# Patient Record
Sex: FEMALE | Race: OTHER | NOT HISPANIC OR LATINO | Employment: FULL TIME | URBAN - NONMETROPOLITAN AREA
[De-identification: names, ages, dates, MRNs, and addresses within clinical notes are randomized per-mention and may not be internally consistent; named-entity substitution may affect disease eponyms.]

---

## 2017-01-23 ENCOUNTER — HOSPITAL ENCOUNTER (OUTPATIENT)
Dept: MAMMOGRAPHY | Facility: HOSPITAL | Age: 47
Discharge: HOME | End: 2017-01-23

## 2017-01-25 ENCOUNTER — LAB REQUISITION (OUTPATIENT)
Dept: LAB | Facility: HOSPITAL | Age: 47
End: 2017-01-25

## 2017-01-25 DIAGNOSIS — I47.9 PAROXYSMAL TACHYCARDIA (HCC): ICD-10-CM

## 2017-01-25 LAB
T3RU NFR SERPL: 31 % (ref 22.5–37)
T4 SERPL-MCNC: 6.8 MCG/DL (ref 4.5–10.9)
TSH SERPL DL<=0.05 MIU/L-ACNC: 2.87 MIU/ML (ref 0.55–4.78)

## 2017-01-25 PROCEDURE — 84443 ASSAY THYROID STIM HORMONE: CPT | Performed by: FAMILY MEDICINE

## 2017-01-25 PROCEDURE — 84436 ASSAY OF TOTAL THYROXINE: CPT | Performed by: FAMILY MEDICINE

## 2017-01-25 PROCEDURE — 84479 ASSAY OF THYROID (T3 OR T4): CPT | Performed by: FAMILY MEDICINE

## 2017-01-26 ENCOUNTER — TRANSCRIBE ORDERS (OUTPATIENT)
Dept: ADMINISTRATIVE | Facility: HOSPITAL | Age: 47
End: 2017-01-26

## 2017-01-31 ENCOUNTER — OFFICE VISIT (OUTPATIENT)
Dept: CARDIOLOGY | Facility: CLINIC | Age: 47
End: 2017-01-31

## 2017-01-31 ENCOUNTER — APPOINTMENT (OUTPATIENT)
Dept: LAB | Facility: HOSPITAL | Age: 47
End: 2017-01-31
Attending: INTERNAL MEDICINE

## 2017-01-31 VITALS
DIASTOLIC BLOOD PRESSURE: 93 MMHG | OXYGEN SATURATION: 97 % | BODY MASS INDEX: 21.12 KG/M2 | HEIGHT: 66 IN | SYSTOLIC BLOOD PRESSURE: 138 MMHG | WEIGHT: 131.4 LBS | HEART RATE: 93 BPM

## 2017-01-31 DIAGNOSIS — R00.0 TACHYCARDIA: ICD-10-CM

## 2017-01-31 DIAGNOSIS — I10 ESSENTIAL HYPERTENSION: ICD-10-CM

## 2017-01-31 DIAGNOSIS — R07.9 CHEST PAIN, UNSPECIFIED TYPE: Primary | ICD-10-CM

## 2017-01-31 PROCEDURE — 93000 ELECTROCARDIOGRAM COMPLETE: CPT | Performed by: INTERNAL MEDICINE

## 2017-01-31 PROCEDURE — 99204 OFFICE O/P NEW MOD 45 MIN: CPT | Performed by: INTERNAL MEDICINE

## 2017-01-31 PROCEDURE — 36415 COLL VENOUS BLD VENIPUNCTURE: CPT | Performed by: INTERNAL MEDICINE

## 2017-01-31 PROCEDURE — 82024 ASSAY OF ACTH: CPT | Performed by: INTERNAL MEDICINE

## 2017-01-31 NOTE — MR AVS SNAPSHOT
Isi Bolton   1/31/2017 2:00 PM   Office Visit    Dept Phone:  [phone]   Encounter #:  [id number]    Provider:  Eh Go DO   Department:  [facility] CARDIOLOGY                Your Full Care Plan              Today's Medication Changes          These changes are accurate as of: 1/31/17  2:54 PM.  If you have any questions, ask your nurse or doctor.               Stop taking medication(s)listed here:     ciprofloxacin 500 MG tablet   Commonly known as:  CIPRO   Stopped by:  Eh Go DO           FLUoxetine 20 MG capsule   Commonly known as:  PROzac   Stopped by:  Eh Go DO                      Your Updated Medication List          This list is accurate as of: 1/31/17  2:54 PM.  Always use your most recent med list.                ALPRAZolam 0.5 MG tablet   Commonly known as:  XANAX   Take 1 tablet by mouth 2 (Two) Times a Day.       amLODIPine 5 MG tablet   Commonly known as:  NORVASC   TAKE ONE TABLET BY MOUTH EVERY DAY       * atenolol 25 MG tablet   Commonly known as:  TENORMIN   Take 1 tablet by mouth Daily.       * atenolol 25 MG tablet   Commonly known as:  TENORMIN   TAKE 1 TABLET BY MOUTH DAILY.       CloNIDine 0.1 MG tablet   Commonly known as:  CATAPRES   Take 1 tablet by mouth 3 (three) times a day as needed.       * lisinopril 40 MG tablet   Commonly known as:  PRINIVIL,ZESTRIL   Take 1 tablet by mouth Daily.       * lisinopril 40 MG tablet   Commonly known as:  PRINIVIL,ZESTRIL   TAKE 1 TABLET BY MOUTH EVERY DAY       pantoprazole 40 MG EC tablet   Commonly known as:  PROTONIX   TAKE 1 TABLET BY MOUTH DAILY.       * Notice:  This list has 4 medication(s) that are the same as other medications prescribed for you. Read the directions carefully, and ask your doctor or other care provider to review them with you.            We Performed the Following     Cardiac Event Monitor     ECG 12 Lead       You Were Diagnosed With        Codes  Comments    Chest pain, unspecified type    -  Primary ICD-10-CM: R07.9  ICD-9-CM: 786.50     Essential hypertension     ICD-10-CM: I10  ICD-9-CM: 401.9     Tachycardia     ICD-10-CM: R00.0  ICD-9-CM: 785.0       Instructions     None    Patient Instructions History      Upcoming Appointments     Visit Type Date Time Department    NEW PATIENT 2017  2:00 PM [facility]    MAMMO COR SCREEN KENNEDY BILATERAL 2017  8:15 AM  [facility]    FOLLOW UP 3/1/2017  2:40 PM [facility]      Intell[employer] Signup     [facility] [place] allows you to send messages to your doctor, view your test results, renew your prescriptions, schedule appointments, and more. To sign up, go to [email or web address] and click on the Sign Up Now link in the New User? box. Enter your [facility] Activation Code exactly as it appears below along with the last four digits of your Social Security Number and your Date of Birth () to complete the sign-up process. If you do not sign up before the expiration date, you must request a new code.    [facility] Activation Code: [email or web address]  Expires: 2017  2:54 PM    If you have questions, you can email [email or web address] or call [phone] to talk to our [facility] staff. Remember, [facility] is NOT to be used for urgent needs. For medical emergencies, dial 911.               Other Info from Your Visit           Your Appointments     2017  8:15 AM EST   Mammo cor screen kennedy bilateral with COR BR CARE MAMM 1   [facility] ([place])    [address] [facility] [facility] [address] [phone]           Bring any films/reports from outside facilities. Do not apply any powders, perfumes, deodorants, or lotions on the day of the exam. Arrive 15 minutes prior to exam time.            Mar 01, 2017  2:40 PM EST   Follow Up with Eh Go DO   [facility] (--)    [address]  [place]  "[id number] [phone]           Arrive 15 minutes prior to appointment.              Allergies     No Known Allergies      Reason for Visit     NEW PATIENT     Hypertension           Vital Signs     Blood Pressure Pulse Height Weight Oxygen Saturation Body Mass Index    138/93 (BP Location: Right arm, Patient Position: Sitting) 93 66\" (167.6 cm) 131 lb 6.4 oz (59.6 kg) 97% 21.21 kg/m2    Smoking Status                   Former Smoker           Problems and Diagnoses Noted     High blood pressure    Fast heart beat    Chest pain    -  Primary      Results     ECG 12 Lead               "

## 2017-01-31 NOTE — PROGRESS NOTES
Subjective   NAME:    Isi Bolton   :      1970  DATE:    2017    Isi Bolton is a 46-year-old  female who appears mildly anxious today.  She also long-standing history of hypertension and has undergone multiple medications.  Over the last 2 months she's had some significant swings in her blood pressures going 9 to 160s and swings in her heart rate up to 130s.  His past history of premature ventricular contractions which are controlled with the beta blocker that she's currently taking.    She denies previous history of myocardial infarction, diabetes mellitus, COPD, renal or liver disease.    REASON FOR VISIT:  Chief Complaint   Patient presents with   • NEW PATIENT   • Hypertension       HISTORY:  PAST MEDICAL HISTORY: History reviewed. No pertinent past medical history.    SURGICAL HISTORY: History reviewed. No pertinent past surgical history.    SOCIAL HISTORY:   Social History     Social History   • Marital status:      Spouse name: N/A   • Number of children: N/A   • Years of education: N/A     Social History Main Topics   • Smoking status: Former Smoker     Types: Electronic Cigarette   • Smokeless tobacco: Current User      Comment: VAPOR    • Alcohol use Yes   • Drug use: No   • Sexual activity: Not Asked     Other Topics Concern   • None     Social History Narrative   • None       FAMILY HISTORY:   Family History   Problem Relation Age of Onset   • No Known Problems Mother    • No Known Problems Father    • No Known Problems Sister        REVIEW OF SYSTEMS:  Review of Systems   Constitutional: Positive for activity change, appetite change and fatigue.   HENT: Positive for tinnitus. Negative for congestion.    Eyes: Positive for visual disturbance.   Respiratory: Positive for chest tightness and shortness of breath. Negative for cough.    Cardiovascular: Positive for chest pain. Negative for palpitations and leg swelling.   Gastrointestinal: Positive for nausea and  "vomiting. Negative for blood in stool.   Endocrine: Positive for cold intolerance and heat intolerance. Negative for polyuria.   Genitourinary: Negative for dysuria.   Musculoskeletal: Positive for myalgias and neck pain.   Skin: Negative for rash.   Neurological: Positive for dizziness, syncope, weakness and light-headedness.   Hematological: Does not bruise/bleed easily.   Psychiatric/Behavioral: Positive for sleep disturbance. Negative for confusion.       Objective       PHYSICAL EXAMINATION:  Physical Exam   Constitutional: She is oriented to person, place, and time. She appears well-developed and well-nourished.   HENT:   Head: Normocephalic and atraumatic.   Eyes: Conjunctivae and EOM are normal. Pupils are equal, round, and reactive to light.   Neck: Normal range of motion. Neck supple. No JVD present. No tracheal deviation present. No thyromegaly present.   Cardiovascular: Normal rate, regular rhythm, normal heart sounds and intact distal pulses.  Exam reveals no gallop and no friction rub.    No murmur heard.  Pulmonary/Chest: Effort normal and breath sounds normal. No respiratory distress. She has no wheezes. She has no rales. She exhibits no tenderness.   Abdominal: Soft. Bowel sounds are normal. She exhibits no distension and no mass. There is no tenderness. No hernia.   Musculoskeletal: Normal range of motion. She exhibits no edema, tenderness or deformity.   Lymphadenopathy:     She has no cervical adenopathy.   Neurological: She is alert and oriented to person, place, and time. She has normal reflexes. She displays normal reflexes. No cranial nerve deficit. She exhibits normal muscle tone. Coordination normal.   Skin: Skin is warm and dry.   Psychiatric: She has a normal mood and affect. Her behavior is normal. Judgment and thought content normal.       VITAL SIGNS:   Visit Vitals   • /93 (BP Location: Right arm, Patient Position: Sitting)   • Pulse 93   • Ht 66\" (167.6 cm)   • Wt 131 lb 6.4 oz " (59.6 kg)   • SpO2 97%   • BMI 21.21 kg/m2       Procedure     ECG 12 Lead  Date/Time: 1/31/2017 2:25 PM  Performed by: ABBI MARIANO  Authorized by: ABBI MARIANO   Rhythm: sinus rhythm  Clinical impression: normal ECG                 Assessment/Plan     Problems Addressed this Visit        Cardiovascular and Mediastinum    Essential hypertension    Relevant Orders    ACTH    Tachycardia    Relevant Orders    ACTH    Adult Transthoracic Echo Complete    Cardiac Event Monitor      Other Visit Diagnoses     Chest pain, unspecified type    -  Primary    Relevant Orders    ECG 12 Lead    Adult Transthoracic Echo Complete    Stress Test With Myocardial Perfusion One Day           Orders Placed This Encounter   Procedures   • ACTH     Standing Status:   Future     Standing Expiration Date:   1/31/2018   • Cardiac Event Monitor     Order Specific Question:   Reason for exam?     Answer:   Palpitations   • Stress Test With Myocardial Perfusion One Day     Standing Status:   Future     Standing Expiration Date:   1/31/2018     Order Specific Question:   What stress agent will be used?     Answer:   Exercise with possible pharmacologic     Order Specific Question:   Reason for exam?     Answer:   Arrhythmia   • ECG 12 Lead     This order was created via procedure documentation   • Adult Transthoracic Echo Complete     Standing Status:   Future     Standing Expiration Date:   1/31/2018     Order Specific Question:   Reason for exam?     Answer:   Palpitations       Return in about 4 weeks (around 2/28/2017).    Current Outpatient Prescriptions:   •  ALPRAZolam (XANAX) 0.5 MG tablet, Take 1 tablet by mouth 2 (Two) Times a Day., Disp: 60 tablet, Rfl: 0  •  amLODIPine (NORVASC) 5 MG tablet, TAKE ONE TABLET BY MOUTH EVERY DAY, Disp: 30 tablet, Rfl: 3  •  atenolol (TENORMIN) 25 MG tablet, Take 1 tablet by mouth Daily., Disp: 30 tablet, Rfl: 3  •  cloNIDine (CATAPRES) 0.1 MG tablet, Take 1 tablet by mouth 3 (three) times a day as  needed., Disp: 60 tablet, Rfl: 1  •  lisinopril (PRINIVIL,ZESTRIL) 40 MG tablet, Take 1 tablet by mouth Daily., Disp: 30 tablet, Rfl: 3  •  pantoprazole (PROTONIX) 40 MG EC tablet, TAKE 1 TABLET BY MOUTH DAILY., Disp: 30 tablet, Rfl: 3  •  atenolol (TENORMIN) 25 MG tablet, TAKE 1 TABLET BY MOUTH DAILY., Disp: 30 tablet, Rfl: 3  •  lisinopril (PRINIVIL,ZESTRIL) 40 MG tablet, TAKE 1 TABLET BY MOUTH EVERY DAY, Disp: 30 tablet, Rfl: 3             Eh Go D.O., [facility], FACC, FACOI, [facility]

## 2017-02-01 LAB — ACTH PLAS-MCNC: 10.4 PG/ML (ref 7.2–63.3)

## 2017-02-03 ENCOUNTER — LAB (OUTPATIENT)
Dept: LAB | Facility: HOSPITAL | Age: 47
End: 2017-02-03

## 2017-02-03 ENCOUNTER — TRANSCRIBE ORDERS (OUTPATIENT)
Dept: LAB | Facility: HOSPITAL | Age: 47
End: 2017-02-03

## 2017-02-03 DIAGNOSIS — R61 NIGHT SWEATS: ICD-10-CM

## 2017-02-03 DIAGNOSIS — R61 NIGHT SWEATS: Primary | ICD-10-CM

## 2017-02-03 LAB
ESTRADIOL SERPL HS-MCNC: 69.9 PG/ML
FSH SERPL-ACNC: 7.8 MIU/ML
LH SERPL-ACNC: 3.5 MIU/ML
TESTOST SERPL-MCNC: 41.55 NG/DL

## 2017-02-03 PROCEDURE — 83001 ASSAY OF GONADOTROPIN (FSH): CPT | Performed by: FAMILY MEDICINE

## 2017-02-03 PROCEDURE — 36415 COLL VENOUS BLD VENIPUNCTURE: CPT

## 2017-02-03 PROCEDURE — 82670 ASSAY OF TOTAL ESTRADIOL: CPT | Performed by: FAMILY MEDICINE

## 2017-02-03 PROCEDURE — 84403 ASSAY OF TOTAL TESTOSTERONE: CPT | Performed by: FAMILY MEDICINE

## 2017-02-03 PROCEDURE — 83002 ASSAY OF GONADOTROPIN (LH): CPT | Performed by: FAMILY MEDICINE

## 2017-02-15 ENCOUNTER — HOSPITAL ENCOUNTER (OUTPATIENT)
Dept: NUCLEAR MEDICINE | Facility: HOSPITAL | Age: 47
Discharge: HOME | End: 2017-02-15
Attending: INTERNAL MEDICINE

## 2017-02-15 ENCOUNTER — HOSPITAL ENCOUNTER (OUTPATIENT)
Dept: CARDIOLOGY | Facility: HOSPITAL | Age: 47
Discharge: HOME | End: 2017-02-15
Attending: INTERNAL MEDICINE

## 2017-02-15 VITALS — BODY MASS INDEX: 21.38 KG/M2 | WEIGHT: 133 LBS | HEIGHT: 66 IN

## 2017-02-15 DIAGNOSIS — R07.9 CHEST PAIN, UNSPECIFIED TYPE: ICD-10-CM

## 2017-02-15 DIAGNOSIS — R00.0 TACHYCARDIA: ICD-10-CM

## 2017-02-15 LAB
BH CV ECHO MEAS - % IVS THICK: 37.9 %
BH CV ECHO MEAS - % LVPW THICK: 53.8 %
BH CV ECHO MEAS - ACS: 2 CM
BH CV ECHO MEAS - AO ROOT AREA (BSA CORRECTED): 1.8
BH CV ECHO MEAS - AO ROOT AREA: 7.3 CM^2
BH CV ECHO MEAS - AO ROOT DIAM: 3 CM
BH CV ECHO MEAS - BSA(HAYCOCK): 1.7 M^2
BH CV ECHO MEAS - BSA: 1.7 M^2
BH CV ECHO MEAS - BZI_BMI: 21.1 KILOGRAMS/M^2
BH CV ECHO MEAS - BZI_METRIC_HEIGHT: 167.6 CM
BH CV ECHO MEAS - BZI_METRIC_WEIGHT: 59.4 KG
BH CV ECHO MEAS - CONTRAST EF 4CH: 56.9 ML/M^2
BH CV ECHO MEAS - EDV(CUBED): 85 ML
BH CV ECHO MEAS - EDV(MOD-SP4): 51 ML
BH CV ECHO MEAS - EDV(TEICH): 87.6 ML
BH CV ECHO MEAS - EF(CUBED): 73 %
BH CV ECHO MEAS - EF(MOD-SP4): 56.9 %
BH CV ECHO MEAS - EF(TEICH): 65 %
BH CV ECHO MEAS - ESV(CUBED): 22.9 ML
BH CV ECHO MEAS - ESV(MOD-SP4): 22 ML
BH CV ECHO MEAS - ESV(TEICH): 30.6 ML
BH CV ECHO MEAS - FS: 35.4 %
BH CV ECHO MEAS - IVS/LVPW: 1.1
BH CV ECHO MEAS - IVSD: 0.9 CM
BH CV ECHO MEAS - IVSS: 1.2 CM
BH CV ECHO MEAS - LA DIMENSION: 2.9 CM
BH CV ECHO MEAS - LA/AO: 0.94
BH CV ECHO MEAS - LV DIASTOLIC VOL/BSA (35-75): 30.5 ML/M^2
BH CV ECHO MEAS - LV MASS(C)D: 119.8 GRAMS
BH CV ECHO MEAS - LV MASS(C)DI: 71.7 GRAMS/M^2
BH CV ECHO MEAS - LV MASS(C)S: 107.8 GRAMS
BH CV ECHO MEAS - LV MASS(C)SI: 64.5 GRAMS/M^2
BH CV ECHO MEAS - LV SYSTOLIC VOL/BSA (12-30): 13.2 ML/M^2
BH CV ECHO MEAS - LVIDD: 4.4 CM
BH CV ECHO MEAS - LVIDS: 2.8 CM
BH CV ECHO MEAS - LVLD AP4: 7.5 CM
BH CV ECHO MEAS - LVLS AP4: 6.9 CM
BH CV ECHO MEAS - LVOT AREA (M): 2 CM^2
BH CV ECHO MEAS - LVOT AREA: 2 CM^2
BH CV ECHO MEAS - LVOT DIAM: 1.6 CM
BH CV ECHO MEAS - LVPWD: 0.81 CM
BH CV ECHO MEAS - LVPWS: 1.2 CM
BH CV ECHO MEAS - MV A MAX VEL: 53.3 CM/SEC
BH CV ECHO MEAS - MV E MAX VEL: 78.5 CM/SEC
BH CV ECHO MEAS - MV E/A: 1.5
BH CV ECHO MEAS - PA ACC SLOPE: 1006 CM/SEC^2
BH CV ECHO MEAS - PA ACC TIME: 0.08 SEC
BH CV ECHO MEAS - PA PR(ACCEL): 42.6 MMHG
BH CV ECHO MEAS - RAP SYSTOLE: 10 MMHG
BH CV ECHO MEAS - RVDD: 2.2 CM
BH CV ECHO MEAS - RVSP: 42.6 MMHG
BH CV ECHO MEAS - SI(CUBED): 37.2 ML/M^2
BH CV ECHO MEAS - SI(MOD-SP4): 17.4 ML/M^2
BH CV ECHO MEAS - SI(TEICH): 34.1 ML/M^2
BH CV ECHO MEAS - SV(CUBED): 62.1 ML
BH CV ECHO MEAS - SV(MOD-SP4): 29 ML
BH CV ECHO MEAS - SV(TEICH): 56.9 ML
BH CV ECHO MEAS - TR MAX VEL: 285.6 CM/SEC
BH CV NUCLEAR PRIOR STUDY: 2
BH CV STRESS BP STAGE 1: NORMAL
BH CV STRESS BP STAGE 2: NORMAL
BH CV STRESS BP STAGE 3: NORMAL
BH CV STRESS DURATION MIN STAGE 1: 3
BH CV STRESS DURATION MIN STAGE 2: 3
BH CV STRESS DURATION MIN STAGE 3: 1
BH CV STRESS DURATION SEC STAGE 1: 0
BH CV STRESS DURATION SEC STAGE 2: 0
BH CV STRESS DURATION SEC STAGE 3: 0
BH CV STRESS GRADE STAGE 1: 10
BH CV STRESS GRADE STAGE 2: 12
BH CV STRESS GRADE STAGE 3: 14
BH CV STRESS HR STAGE 1: 112
BH CV STRESS HR STAGE 2: 142
BH CV STRESS HR STAGE 3: 155
BH CV STRESS METS STAGE 1: 5
BH CV STRESS METS STAGE 2: 7.5
BH CV STRESS METS STAGE 3: 10
BH CV STRESS PROTOCOL 1: NORMAL
BH CV STRESS RECOVERY BP: NORMAL MMHG
BH CV STRESS RECOVERY HR: 77 BPM
BH CV STRESS SPEED STAGE 1: 1.7
BH CV STRESS SPEED STAGE 2: 2.5
BH CV STRESS SPEED STAGE 3: 3.4
BH CV STRESS STAGE 1: 1
BH CV STRESS STAGE 2: 2
BH CV STRESS STAGE 3: 3
LV EF 2D ECHO EST: 60 %
LV EF NUC BP: 80 %
MAXIMAL PREDICTED HEART RATE: 173 BPM
PERCENT MAX PREDICTED HR: 89.6 %
STRESS BASELINE BP: NORMAL MMHG
STRESS BASELINE HR: 63 BPM
STRESS PERCENT HR: 105 %
STRESS POST ESTIMATED WORKLOAD: 10.1 METS
STRESS POST EXERCISE DUR MIN: 7 MIN
STRESS POST EXERCISE DUR SEC: 0 SEC
STRESS POST PEAK BP: NORMAL MMHG
STRESS POST PEAK HR: 155 BPM
STRESS TARGET HR: 147 BPM

## 2017-02-15 PROCEDURE — 93306 TTE W/DOPPLER COMPLETE: CPT

## 2017-02-15 PROCEDURE — A9500 TC99M SESTAMIBI: HCPCS | Performed by: INTERNAL MEDICINE

## 2017-02-15 PROCEDURE — 93018 CV STRESS TEST I&R ONLY: CPT | Performed by: INTERNAL MEDICINE

## 2017-02-15 PROCEDURE — 93017 CV STRESS TEST TRACING ONLY: CPT

## 2017-02-15 PROCEDURE — 93306 TTE W/DOPPLER COMPLETE: CPT | Performed by: INTERNAL MEDICINE

## 2017-02-15 PROCEDURE — 78452 HT MUSCLE IMAGE SPECT MULT: CPT | Performed by: INTERNAL MEDICINE

## 2017-02-15 PROCEDURE — 0 TECHNETIUM SESTAMIBI: Performed by: INTERNAL MEDICINE

## 2017-02-15 RX ADMIN — Medication 1 DOSE: at 09:25

## 2017-02-15 RX ADMIN — Medication 1 DOSE: at 07:55

## 2017-02-17 ENCOUNTER — TRANSCRIBE ORDERS (OUTPATIENT)
Dept: ADMINISTRATIVE | Facility: HOSPITAL | Age: 47
End: 2017-02-17

## 2017-02-17 DIAGNOSIS — Z12.31 VISIT FOR SCREENING MAMMOGRAM: Primary | ICD-10-CM

## 2017-02-20 ENCOUNTER — HOSPITAL ENCOUNTER (OUTPATIENT)
Dept: MAMMOGRAPHY | Facility: HOSPITAL | Age: 47
Discharge: HOME | End: 2017-02-20
Admitting: FAMILY MEDICINE

## 2017-02-20 DIAGNOSIS — Z12.31 VISIT FOR SCREENING MAMMOGRAM: ICD-10-CM

## 2017-02-20 PROCEDURE — G0202 SCR MAMMO BI INCL CAD: HCPCS

## 2017-02-20 PROCEDURE — 77063 BREAST TOMOSYNTHESIS BI: CPT

## 2017-02-20 PROCEDURE — 77063 BREAST TOMOSYNTHESIS BI: CPT | Performed by: RADIOLOGY

## 2017-02-20 PROCEDURE — 77067 SCR MAMMO BI INCL CAD: CPT | Performed by: RADIOLOGY

## 2017-03-01 ENCOUNTER — TELEPHONE (OUTPATIENT)
Dept: CARDIOLOGY | Facility: CLINIC | Age: 47
End: 2017-03-01

## 2017-03-01 NOTE — TELEPHONE ENCOUNTER
CALLED PATIENT TO RESCHEDULE APPT DUE TO DR MARIANO NOT BEING IN CLINIC TODAY BECAUSE HE IS SICK. LEFT MESSAGE FOR HER TO CALL US BACK TO GET HER BACK ON THE SCHEDULE

## 2017-03-10 ENCOUNTER — TELEPHONE (OUTPATIENT)
Dept: CARDIOLOGY | Facility: CLINIC | Age: 47
End: 2017-03-10

## 2017-03-10 NOTE — TELEPHONE ENCOUNTER
----- Message from Eh Go DO sent at 3/9/2017  1:19 PM EST -----  Notify patient stress test is normal

## 2017-03-10 NOTE — TELEPHONE ENCOUNTER
----- Message from Eh Go DO sent at 3/9/2017  2:21 PM EST -----  Left patients lab were normal (ACTH)

## 2017-04-06 ENCOUNTER — TRANSCRIBE ORDERS (OUTPATIENT)
Dept: ADMINISTRATIVE | Facility: HOSPITAL | Age: 47
End: 2017-04-06

## 2017-04-06 ENCOUNTER — LAB (OUTPATIENT)
Dept: LAB | Facility: HOSPITAL | Age: 47
End: 2017-04-06
Attending: INTERNAL MEDICINE

## 2017-04-06 DIAGNOSIS — R20.2 PARESTHESIA: ICD-10-CM

## 2017-04-06 DIAGNOSIS — G25.0 BENIGN ESSENTIAL TREMOR: Primary | ICD-10-CM

## 2017-04-06 DIAGNOSIS — R20.2 PARESTHESIA: Primary | ICD-10-CM

## 2017-04-06 LAB
ALBUMIN SERPL-MCNC: 5 G/DL (ref 3.5–5)
ALBUMIN/GLOB SERPL: 1.4 G/DL (ref 1.5–2.5)
ALP SERPL-CCNC: 49 U/L (ref 35–104)
ALT SERPL W P-5'-P-CCNC: 18 U/L (ref 10–36)
ANION GAP SERPL CALCULATED.3IONS-SCNC: 7.8 MMOL/L (ref 3.6–11.2)
AST SERPL-CCNC: 25 U/L (ref 10–30)
BASOPHILS # BLD AUTO: 0.02 10*3/MM3 (ref 0–0.3)
BASOPHILS NFR BLD AUTO: 0.3 % (ref 0–2)
BILIRUB SERPL-MCNC: 1.1 MG/DL (ref 0.2–1.8)
BUN BLD-MCNC: 7 MG/DL (ref 7–21)
BUN/CREAT SERPL: 9.5 (ref 7–25)
CALCIUM SPEC-SCNC: 9.8 MG/DL (ref 7.7–10)
CHLORIDE SERPL-SCNC: 101 MMOL/L (ref 99–112)
CHROMATIN AB SERPL-ACNC: 1 IU/ML (ref 0–14)
CO2 SERPL-SCNC: 29.2 MMOL/L (ref 24.3–31.9)
CREAT BLD-MCNC: 0.74 MG/DL (ref 0.43–1.29)
CRP SERPL-MCNC: <0.5 MG/DL (ref 0–0.99)
DEPRECATED RDW RBC AUTO: 46.3 FL (ref 37–54)
EOSINOPHIL # BLD AUTO: 0.06 10*3/MM3 (ref 0–0.7)
EOSINOPHIL NFR BLD AUTO: 0.8 % (ref 0–5)
ERYTHROCYTE [DISTWIDTH] IN BLOOD BY AUTOMATED COUNT: 12.4 % (ref 11.5–14.5)
ERYTHROCYTE [SEDIMENTATION RATE] IN BLOOD: 8 MM/HR (ref 0–20)
FOLATE SERPL-MCNC: 14.61 NG/ML (ref 5.4–20)
GFR SERPL CREATININE-BSD FRML MDRD: 84 ML/MIN/1.73
GLOBULIN UR ELPH-MCNC: 3.6 GM/DL
GLUCOSE BLD-MCNC: 104 MG/DL (ref 70–110)
HCT VFR BLD AUTO: 42.1 % (ref 37–47)
HGB BLD-MCNC: 13.7 G/DL (ref 12–16)
IMM GRANULOCYTES # BLD: 0.02 10*3/MM3 (ref 0–0.03)
IMM GRANULOCYTES NFR BLD: 0.3 % (ref 0–0.5)
LYMPHOCYTES # BLD AUTO: 2.85 10*3/MM3 (ref 1–3)
LYMPHOCYTES NFR BLD AUTO: 36 % (ref 21–51)
MCH RBC QN AUTO: 33.3 PG (ref 27–33)
MCHC RBC AUTO-ENTMCNC: 32.5 G/DL (ref 33–37)
MCV RBC AUTO: 102.4 FL (ref 80–94)
MONOCYTES # BLD AUTO: 0.63 10*3/MM3 (ref 0.1–0.9)
MONOCYTES NFR BLD AUTO: 8 % (ref 0–10)
NEUTROPHILS # BLD AUTO: 4.33 10*3/MM3 (ref 1.4–6.5)
NEUTROPHILS NFR BLD AUTO: 54.6 % (ref 30–70)
OSMOLALITY SERPL CALC.SUM OF ELEC: 274 MOSM/KG (ref 273–305)
PLATELET # BLD AUTO: 264 10*3/MM3 (ref 130–400)
PMV BLD AUTO: 9.8 FL (ref 6–10)
POTASSIUM BLD-SCNC: 3.8 MMOL/L (ref 3.5–5.3)
PROT SERPL-MCNC: 8.6 G/DL (ref 6–8)
RBC # BLD AUTO: 4.11 10*6/MM3 (ref 4.2–5.4)
SODIUM BLD-SCNC: 138 MMOL/L (ref 135–153)
TSH SERPL DL<=0.05 MIU/L-ACNC: 2.36 MIU/ML (ref 0.55–4.78)
VIT B12 BLD-MCNC: >2000 PG/ML (ref 211–911)
WBC NRBC COR # BLD: 7.91 10*3/MM3 (ref 4.5–12.5)

## 2017-04-06 PROCEDURE — 85652 RBC SED RATE AUTOMATED: CPT | Performed by: PSYCHIATRY & NEUROLOGY

## 2017-04-06 PROCEDURE — 86038 ANTINUCLEAR ANTIBODIES: CPT | Performed by: PSYCHIATRY & NEUROLOGY

## 2017-04-06 PROCEDURE — 86140 C-REACTIVE PROTEIN: CPT | Performed by: PSYCHIATRY & NEUROLOGY

## 2017-04-06 PROCEDURE — 86431 RHEUMATOID FACTOR QUANT: CPT | Performed by: PSYCHIATRY & NEUROLOGY

## 2017-04-06 PROCEDURE — 84443 ASSAY THYROID STIM HORMONE: CPT | Performed by: PSYCHIATRY & NEUROLOGY

## 2017-04-06 PROCEDURE — 36415 COLL VENOUS BLD VENIPUNCTURE: CPT

## 2017-04-06 PROCEDURE — 82746 ASSAY OF FOLIC ACID SERUM: CPT | Performed by: PSYCHIATRY & NEUROLOGY

## 2017-04-06 PROCEDURE — 82607 VITAMIN B-12: CPT | Performed by: PSYCHIATRY & NEUROLOGY

## 2017-04-06 PROCEDURE — 80053 COMPREHEN METABOLIC PANEL: CPT | Performed by: PSYCHIATRY & NEUROLOGY

## 2017-04-06 PROCEDURE — 85025 COMPLETE CBC W/AUTO DIFF WBC: CPT | Performed by: PSYCHIATRY & NEUROLOGY

## 2017-04-07 LAB — ANA SER QL: NEGATIVE

## 2017-04-11 ENCOUNTER — HOSPITAL ENCOUNTER (OUTPATIENT)
Dept: MRI IMAGING | Facility: HOSPITAL | Age: 47
Discharge: HOME | End: 2017-04-11
Admitting: PSYCHIATRY & NEUROLOGY

## 2017-04-11 DIAGNOSIS — G25.0 BENIGN ESSENTIAL TREMOR: ICD-10-CM

## 2017-04-11 PROCEDURE — 70553 MRI BRAIN STEM W/O & W/DYE: CPT

## 2017-04-11 PROCEDURE — 0 GADOBENATE DIMEGLUMINE 529 MG/ML SOLUTION: Performed by: PSYCHIATRY & NEUROLOGY

## 2017-04-11 PROCEDURE — 70553 MRI BRAIN STEM W/O & W/DYE: CPT | Performed by: RADIOLOGY

## 2017-04-11 PROCEDURE — A9577 INJ MULTIHANCE: HCPCS | Performed by: PSYCHIATRY & NEUROLOGY

## 2017-04-11 RX ADMIN — GADOBENATE DIMEGLUMINE 12 ML: 529 INJECTION, SOLUTION INTRAVENOUS at 08:35

## 2018-04-23 ENCOUNTER — HOSPITAL ENCOUNTER (OUTPATIENT)
Dept: MAMMOGRAPHY | Facility: HOSPITAL | Age: 48
Discharge: HOME | End: 2018-04-23
Admitting: FAMILY MEDICINE

## 2018-04-23 DIAGNOSIS — Z12.39 BREAST CANCER SCREENING: ICD-10-CM

## 2018-04-23 PROCEDURE — 77067 SCR MAMMO BI INCL CAD: CPT

## 2018-04-23 PROCEDURE — 77063 BREAST TOMOSYNTHESIS BI: CPT

## 2018-04-23 PROCEDURE — 77067 SCR MAMMO BI INCL CAD: CPT | Performed by: RADIOLOGY

## 2018-04-23 PROCEDURE — 77063 BREAST TOMOSYNTHESIS BI: CPT | Performed by: RADIOLOGY

## 2019-07-18 ENCOUNTER — HOSPITAL ENCOUNTER (OUTPATIENT)
Dept: MAMMOGRAPHY | Facility: HOSPITAL | Age: 49
Discharge: HOME | End: 2019-07-18
Admitting: FAMILY MEDICINE

## 2019-07-18 DIAGNOSIS — Z12.39 SCREENING BREAST EXAMINATION: ICD-10-CM

## 2019-07-18 PROCEDURE — 77067 SCR MAMMO BI INCL CAD: CPT | Performed by: RADIOLOGY

## 2019-07-18 PROCEDURE — 77063 BREAST TOMOSYNTHESIS BI: CPT | Performed by: RADIOLOGY

## 2019-07-18 PROCEDURE — 77063 BREAST TOMOSYNTHESIS BI: CPT

## 2019-07-18 PROCEDURE — 77067 SCR MAMMO BI INCL CAD: CPT

## 2020-11-25 ENCOUNTER — TRANSCRIBE ORDERS (OUTPATIENT)
Dept: ULTRASOUND IMAGING | Facility: HOSPITAL | Age: 50
End: 2020-11-25

## 2020-11-25 ENCOUNTER — HOSPITAL ENCOUNTER (OUTPATIENT)
Dept: ULTRASOUND IMAGING | Facility: HOSPITAL | Age: 50
Discharge: HOME | End: 2020-11-25
Admitting: FAMILY MEDICINE

## 2020-11-25 DIAGNOSIS — N92.0 EXCESSIVE OR FREQUENT MENSTRUATION: Primary | ICD-10-CM

## 2020-11-25 DIAGNOSIS — N92.0 EXCESSIVE OR FREQUENT MENSTRUATION: ICD-10-CM

## 2020-11-25 PROCEDURE — 76856 US EXAM PELVIC COMPLETE: CPT | Performed by: RADIOLOGY

## 2020-11-25 PROCEDURE — 76856 US EXAM PELVIC COMPLETE: CPT

## 2022-07-25 ENCOUNTER — TRANSCRIBE ORDERS (OUTPATIENT)
Dept: ADMINISTRATIVE | Facility: HOSPITAL | Age: 52
End: 2022-07-25

## 2022-07-25 ENCOUNTER — LAB (OUTPATIENT)
Dept: LAB | Facility: HOSPITAL | Age: 52
End: 2022-07-25

## 2022-07-25 DIAGNOSIS — R60.9 EDEMA, UNSPECIFIED TYPE: ICD-10-CM

## 2022-07-25 DIAGNOSIS — R60.9 EDEMA, UNSPECIFIED TYPE: Primary | ICD-10-CM

## 2022-07-25 LAB
ANION GAP SERPL CALCULATED.3IONS-SCNC: 17 MMOL/L (ref 5–15)
BASOPHILS # BLD AUTO: 0.06 10*3/MM3 (ref 0–0.2)
BASOPHILS NFR BLD AUTO: 0.8 % (ref 0–1.5)
BUN SERPL-MCNC: 7 MG/DL (ref 6–20)
BUN/CREAT SERPL: 10.4 (ref 7–25)
CALCIUM SPEC-SCNC: 9.1 MG/DL (ref 8.6–10.5)
CHLORIDE SERPL-SCNC: 101 MMOL/L (ref 98–107)
CO2 SERPL-SCNC: 21 MMOL/L (ref 22–29)
CREAT SERPL-MCNC: 0.67 MG/DL (ref 0.57–1)
DEPRECATED RDW RBC AUTO: 44.2 FL (ref 37–54)
EGFRCR SERPLBLD CKD-EPI 2021: 105.3 ML/MIN/1.73
EOSINOPHIL # BLD AUTO: 0.05 10*3/MM3 (ref 0–0.4)
EOSINOPHIL NFR BLD AUTO: 0.7 % (ref 0.3–6.2)
ERYTHROCYTE [DISTWIDTH] IN BLOOD BY AUTOMATED COUNT: 11.8 % (ref 12.3–15.4)
GLUCOSE SERPL-MCNC: 94 MG/DL (ref 65–99)
HCT VFR BLD AUTO: 40.1 % (ref 34–46.6)
HGB BLD-MCNC: 13.3 G/DL (ref 12–15.9)
IMM GRANULOCYTES # BLD AUTO: 0.01 10*3/MM3 (ref 0–0.05)
IMM GRANULOCYTES NFR BLD AUTO: 0.1 % (ref 0–0.5)
LYMPHOCYTES # BLD AUTO: 3 10*3/MM3 (ref 0.7–3.1)
LYMPHOCYTES NFR BLD AUTO: 42 % (ref 19.6–45.3)
MCH RBC QN AUTO: 34.3 PG (ref 26.6–33)
MCHC RBC AUTO-ENTMCNC: 33.2 G/DL (ref 31.5–35.7)
MCV RBC AUTO: 103.4 FL (ref 79–97)
MONOCYTES # BLD AUTO: 0.57 10*3/MM3 (ref 0.1–0.9)
MONOCYTES NFR BLD AUTO: 8 % (ref 5–12)
NEUTROPHILS NFR BLD AUTO: 3.45 10*3/MM3 (ref 1.7–7)
NEUTROPHILS NFR BLD AUTO: 48.4 % (ref 42.7–76)
NRBC BLD AUTO-RTO: 0 /100 WBC (ref 0–0.2)
PLATELET # BLD AUTO: 226 10*3/MM3 (ref 140–450)
PMV BLD AUTO: 10.7 FL (ref 6–12)
POTASSIUM SERPL-SCNC: 4.1 MMOL/L (ref 3.5–5.2)
RBC # BLD AUTO: 3.88 10*6/MM3 (ref 3.77–5.28)
SODIUM SERPL-SCNC: 139 MMOL/L (ref 136–145)
TSH SERPL DL<=0.05 MIU/L-ACNC: 3.1 UIU/ML (ref 0.27–4.2)
WBC NRBC COR # BLD: 7.14 10*3/MM3 (ref 3.4–10.8)

## 2022-07-25 PROCEDURE — 85025 COMPLETE CBC W/AUTO DIFF WBC: CPT

## 2022-07-25 PROCEDURE — 80048 BASIC METABOLIC PNL TOTAL CA: CPT

## 2022-07-25 PROCEDURE — 36415 COLL VENOUS BLD VENIPUNCTURE: CPT

## 2022-07-25 PROCEDURE — 84443 ASSAY THYROID STIM HORMONE: CPT

## 2022-07-26 ENCOUNTER — TRANSCRIBE ORDERS (OUTPATIENT)
Dept: ADMINISTRATIVE | Facility: HOSPITAL | Age: 52
End: 2022-07-26

## 2022-07-26 DIAGNOSIS — R06.89 DYSPNEA AND RESPIRATORY ABNORMALITIES: Primary | ICD-10-CM

## 2022-07-26 DIAGNOSIS — R06.00 DYSPNEA AND RESPIRATORY ABNORMALITIES: Primary | ICD-10-CM

## 2022-08-15 ENCOUNTER — HOSPITAL ENCOUNTER (OUTPATIENT)
Dept: MAMMOGRAPHY | Facility: HOSPITAL | Age: 52
Discharge: HOME | End: 2022-08-15
Admitting: FAMILY MEDICINE

## 2022-08-15 DIAGNOSIS — Z12.31 VISIT FOR SCREENING MAMMOGRAM: ICD-10-CM

## 2022-08-15 PROCEDURE — 77063 BREAST TOMOSYNTHESIS BI: CPT

## 2022-08-15 PROCEDURE — 77067 SCR MAMMO BI INCL CAD: CPT | Performed by: RADIOLOGY

## 2022-08-15 PROCEDURE — 77063 BREAST TOMOSYNTHESIS BI: CPT | Performed by: RADIOLOGY

## 2022-08-15 PROCEDURE — 77067 SCR MAMMO BI INCL CAD: CPT

## 2022-08-18 ENCOUNTER — TRANSCRIBE ORDERS (OUTPATIENT)
Dept: ADMINISTRATIVE | Facility: HOSPITAL | Age: 52
End: 2022-08-18

## 2022-08-18 ENCOUNTER — APPOINTMENT (OUTPATIENT)
Dept: CARDIOLOGY | Facility: HOSPITAL | Age: 52
End: 2022-08-18

## 2022-08-18 ENCOUNTER — HOSPITAL ENCOUNTER (OUTPATIENT)
Dept: CARDIOLOGY | Facility: HOSPITAL | Age: 52
Discharge: HOME | End: 2022-08-18

## 2022-08-18 ENCOUNTER — HOSPITAL ENCOUNTER (OUTPATIENT)
Dept: RESPIRATORY THERAPY | Facility: HOSPITAL | Age: 52
Discharge: HOME | End: 2022-08-18

## 2022-08-18 DIAGNOSIS — R06.09 OTHER FORMS OF DYSPNEA: ICD-10-CM

## 2022-08-18 DIAGNOSIS — R06.89 DYSPNEA AND RESPIRATORY ABNORMALITIES: ICD-10-CM

## 2022-08-18 DIAGNOSIS — R06.09 OTHER FORMS OF DYSPNEA: Primary | ICD-10-CM

## 2022-08-18 DIAGNOSIS — R06.00 DYSPNEA AND RESPIRATORY ABNORMALITIES: ICD-10-CM

## 2022-08-18 LAB
QT INTERVAL: 462 MS
QTC INTERVAL: 429 MS

## 2022-08-18 PROCEDURE — 93306 TTE W/DOPPLER COMPLETE: CPT | Performed by: INTERNAL MEDICINE

## 2022-08-18 PROCEDURE — 93005 ELECTROCARDIOGRAM TRACING: CPT | Performed by: FAMILY MEDICINE

## 2022-08-18 PROCEDURE — 93010 ELECTROCARDIOGRAM REPORT: CPT | Performed by: INTERNAL MEDICINE

## 2022-08-18 PROCEDURE — 93306 TTE W/DOPPLER COMPLETE: CPT

## 2022-08-20 LAB
BH CV ECHO MEAS - ACS: 2.2 CM
BH CV ECHO MEAS - AO MAX PG: 4.7 MMHG
BH CV ECHO MEAS - AO MEAN PG: 2 MMHG
BH CV ECHO MEAS - AO ROOT DIAM: 2.8 CM
BH CV ECHO MEAS - AO V2 MAX: 108 CM/SEC
BH CV ECHO MEAS - AO V2 VTI: 27.4 CM
BH CV ECHO MEAS - EDV(CUBED): 80.3 ML
BH CV ECHO MEAS - EDV(MOD-SP4): 63.2 ML
BH CV ECHO MEAS - EF(MOD-SP4): 57.1 %
BH CV ECHO MEAS - ESV(CUBED): 22.1 ML
BH CV ECHO MEAS - ESV(MOD-SP4): 27.1 ML
BH CV ECHO MEAS - FS: 35 %
BH CV ECHO MEAS - IVS/LVPW: 1.15 CM
BH CV ECHO MEAS - IVSD: 0.92 CM
BH CV ECHO MEAS - LA DIMENSION: 2.6 CM
BH CV ECHO MEAS - LAT PEAK E' VEL: 8.5 CM/SEC
BH CV ECHO MEAS - LV DIASTOLIC VOL/BSA (35-75): 37.6 CM2
BH CV ECHO MEAS - LV MASS(C)D: 115.6 GRAMS
BH CV ECHO MEAS - LV SYSTOLIC VOL/BSA (12-30): 16.1 CM2
BH CV ECHO MEAS - LVIDD: 4.3 CM
BH CV ECHO MEAS - LVIDS: 2.8 CM
BH CV ECHO MEAS - LVOT AREA: 2.8 CM2
BH CV ECHO MEAS - LVOT DIAM: 1.9 CM
BH CV ECHO MEAS - LVPWD: 0.79 CM
BH CV ECHO MEAS - MED PEAK E' VEL: 7.8 CM/SEC
BH CV ECHO MEAS - PA ACC TIME: 0.06 SEC
BH CV ECHO MEAS - PA PR(ACCEL): 53.8 MMHG
BH CV ECHO MEAS - RAP SYSTOLE: 10 MMHG
BH CV ECHO MEAS - RVSP: 36.7 MMHG
BH CV ECHO MEAS - SI(MOD-SP4): 21.5 ML/M2
BH CV ECHO MEAS - SV(MOD-SP4): 36.1 ML
BH CV ECHO MEAS - TAPSE (>1.6): 2.7 CM
BH CV ECHO MEAS - TR MAX PG: 26.7 MMHG
BH CV ECHO MEAS - TR MAX VEL: 256.3 CM/SEC
LEFT ATRIUM VOLUME INDEX: 14.2 ML/M2
MAXIMAL PREDICTED HEART RATE: 168 BPM
STRESS TARGET HR: 143 BPM

## 2022-09-20 ENCOUNTER — LAB (OUTPATIENT)
Dept: LAB | Facility: HOSPITAL | Age: 52
End: 2022-09-20

## 2022-09-20 ENCOUNTER — TRANSCRIBE ORDERS (OUTPATIENT)
Dept: ADMINISTRATIVE | Facility: HOSPITAL | Age: 52
End: 2022-09-20

## 2022-09-20 DIAGNOSIS — R60.9 EDEMA, UNSPECIFIED TYPE: Primary | ICD-10-CM

## 2022-09-20 DIAGNOSIS — R60.9 EDEMA, UNSPECIFIED TYPE: ICD-10-CM

## 2022-09-20 LAB
ALBUMIN SERPL-MCNC: 4.15 G/DL (ref 3.5–5.2)
ALBUMIN/GLOB SERPL: 1.3 G/DL
ALP SERPL-CCNC: 64 U/L (ref 39–117)
ALT SERPL W P-5'-P-CCNC: 29 U/L (ref 1–33)
ANION GAP SERPL CALCULATED.3IONS-SCNC: 13.6 MMOL/L (ref 5–15)
AST SERPL-CCNC: 45 U/L (ref 1–32)
BILIRUB SERPL-MCNC: 0.9 MG/DL (ref 0–1.2)
BUN SERPL-MCNC: 9 MG/DL (ref 6–20)
BUN/CREAT SERPL: 13.4 (ref 7–25)
CALCIUM SPEC-SCNC: 9.5 MG/DL (ref 8.6–10.5)
CHLORIDE SERPL-SCNC: 100 MMOL/L (ref 98–107)
CO2 SERPL-SCNC: 24.4 MMOL/L (ref 22–29)
CREAT SERPL-MCNC: 0.67 MG/DL (ref 0.57–1)
EGFRCR SERPLBLD CKD-EPI 2021: 105.3 ML/MIN/1.73
GLOBULIN UR ELPH-MCNC: 3.3 GM/DL
GLUCOSE SERPL-MCNC: 90 MG/DL (ref 65–99)
NT-PROBNP SERPL-MCNC: 690.5 PG/ML (ref 0–900)
POTASSIUM SERPL-SCNC: 4.3 MMOL/L (ref 3.5–5.2)
PROT SERPL-MCNC: 7.4 G/DL (ref 6–8.5)
SODIUM SERPL-SCNC: 138 MMOL/L (ref 136–145)

## 2022-09-20 PROCEDURE — 83880 ASSAY OF NATRIURETIC PEPTIDE: CPT

## 2022-09-20 PROCEDURE — 36415 COLL VENOUS BLD VENIPUNCTURE: CPT

## 2022-09-20 PROCEDURE — 80053 COMPREHEN METABOLIC PANEL: CPT

## 2022-10-17 ENCOUNTER — OFFICE VISIT (OUTPATIENT)
Dept: CARDIOLOGY | Facility: CLINIC | Age: 52
End: 2022-10-17

## 2022-10-17 VITALS
WEIGHT: 146.4 LBS | DIASTOLIC BLOOD PRESSURE: 61 MMHG | BODY MASS INDEX: 24.39 KG/M2 | SYSTOLIC BLOOD PRESSURE: 88 MMHG | HEART RATE: 71 BPM | HEIGHT: 65 IN

## 2022-10-17 DIAGNOSIS — R06.09 DYSPNEA ON EXERTION: ICD-10-CM

## 2022-10-17 DIAGNOSIS — R07.2 PRECORDIAL PAIN: Primary | ICD-10-CM

## 2022-10-17 DIAGNOSIS — I10 ESSENTIAL HYPERTENSION: ICD-10-CM

## 2022-10-17 PROCEDURE — 99204 OFFICE O/P NEW MOD 45 MIN: CPT | Performed by: INTERNAL MEDICINE

## 2022-10-17 PROCEDURE — 93000 ELECTROCARDIOGRAM COMPLETE: CPT | Performed by: INTERNAL MEDICINE

## 2022-10-17 NOTE — PROGRESS NOTES
Chloe Barksdale MD  Isi Bolton  1970  10/17/2022    Patient Active Problem List   Diagnosis   • Essential hypertension   • Tachycardia       Dear Chloe Barksdale MD:    Subjective     Isi Bolton is a 52 y.o. female with the problems as listed above, presents    Chief complaint: Increasing shortness of breath for about 6 months and recurrent chest pains.    History of Present Illness: Isi is a pleasant 51-year-old  female with no history of known heart disease or coronary artery disease other than recent finding of mild mitral regurgitation and mild tricuspid regurgitation on the echo Doppler study, has been having increasing shortness of breath over the last 6 months.  She reportedly used to be fairly active prior to that working out without any problem.  However in the last 6 months she has noticed increasing shortness of breath to the point that she would get short of breath getting her mail.  She denies any PND, orthopnea.  She has some intermittent bilateral leg edema.  She is also been complaining of intermittent episodes of chest pain and discomfort with radiation up into the left jaw and left arm that seem to occur at random with and without exertion and resolve spontaneously.  It is described as pressure and heaviness in her substernal region and rated as 5 out of 10 on the pain scale.  There is associated shortness of breath but no sweating.      She is a non-smoker and nondiabetic.  She does have positive family history of premature coronary artery disease with one of her uncles  of massive heart attack in his 50s.  She states that she has had COVID infection in August of this year at which time she had high-grade fever and severe cough.  She was treated as an outpatient by her PCP with prednisone and p.o. antibiotics.  She denies any significant dizziness or syncope.        Cardiac risk factors:hypertension, Positive family Hx. of premature athersclerotivc disease. and  Age.    No Known Allergies:    Current Outpatient Medications:   •  ALPRAZolam (XANAX) 1 MG tablet, Take 1 tablet by mouth 2 (Two) Times a Day As Needed., Disp: 60 tablet, Rfl: 1  •  busPIRone (BUSPAR) 15 MG tablet, Take 1 tablet by mouth 2 (Two) Times a Day., Disp: 180 tablet, Rfl: 1  •  cetirizine (zyrTEC) 10 MG tablet, Take 1 tablet by mouth Daily., Disp: 90 tablet, Rfl: 1  •  CloNIDine (CATAPRES) 0.1 MG tablet, TAKE 1 TABLET BY MOUTH TWICE A DAY, Disp: 60 tablet, Rfl: 2  •  hydroCHLOROthiazide (MICROZIDE) 12.5 MG capsule, Take 1 capsule by mouth Daily., Disp: 30 capsule, Rfl: 2  •  lisinopril (PRINIVIL,ZESTRIL) 20 MG tablet, Take 1 tablet by mouth 2 (Two) Times a Day., Disp: 180 tablet, Rfl: 1  •  montelukast (Singulair) 10 MG tablet, Take 1 tablet by mouth Every Night at bedtime., Disp: 90 tablet, Rfl: 1  •  pantoprazole (PROTONIX) 40 MG EC tablet, TAKE 1 TABLET BY MOUTH DAILY., Disp: 30 tablet, Rfl: 3  •  propranolol (INDERAL) 40 MG tablet, Take 1 tablet by mouth 2 (Two) Times a Day., Disp: 180 tablet, Rfl: 1  •  zinc sulfate (ZINCATE) 220 (50 Zn) MG capsule, Take 1 capsule by mouth twice a day as directed., Disp: 28 capsule, Rfl: 0  •  ALPRAZolam (XANAX) 0.5 MG tablet, Take 1 tablet by mouth 2 (Two) Times a Day., Disp: 60 tablet, Rfl: 0    Past Medical History:   Diagnosis Date   • Angina pectoris (HCC)    • Crohn disease (HCC)    • Heart disease    • Hypertension      Past Surgical History:   Procedure Laterality Date   • CHOLECYSTECTOMY     • TUBAL ABDOMINAL LIGATION       Family History   Problem Relation Age of Onset   • Hypertension Mother    • No Known Problems Father    • No Known Problems Sister    • Heart attack Maternal Uncle    • Breast cancer Paternal Aunt         50's   • Breast cancer Maternal Grandmother         70's   • Heart attack Cousin      Social History     Tobacco Use   • Smoking status: Former     Types: Electronic Cigarette   • Smokeless tobacco: Current   • Tobacco comments:      "VAPOR    Vaping Use   • Vaping Use: Every day   • Substances: Nicotine   Substance Use Topics   • Alcohol use: Yes   • Drug use: No       Review of Systems   Constitutional: Positive for malaise/fatigue.   HENT: Positive for hearing loss and tinnitus.    Eyes: Positive for blurred vision and double vision.   Cardiovascular: Positive for chest pain, dyspnea on exertion, leg swelling and palpitations.   Respiratory: Positive for shortness of breath.    Endocrine: Negative.    Hematologic/Lymphatic: Bruises/bleeds easily.   Skin: Positive for nail changes.   Musculoskeletal: Positive for arthritis, back pain, joint pain, joint swelling, muscle cramps, muscle weakness, myalgias and stiffness.   Gastrointestinal: Positive for change in bowel habit and diarrhea.   Genitourinary: Negative.    Neurological: Positive for dizziness, headaches and light-headedness.   Psychiatric/Behavioral: Positive for depression and memory loss. The patient is nervous/anxious.    Allergic/Immunologic: Negative.        Objective   Blood pressure (!) 88/61, pulse 71, height 165.1 cm (65\"), weight 66.4 kg (146 lb 6.4 oz).  Body mass index is 24.36 kg/m².    Vitals reviewed.   Constitutional:       Appearance: Well-developed.   Eyes:      Conjunctiva/sclera: Conjunctivae normal.   HENT:      Head: Normocephalic.   Neck:      Thyroid: No thyromegaly.      Vascular: No JVD.      Trachea: No tracheal deviation.   Pulmonary:      Effort: No respiratory distress.      Breath sounds: Normal breath sounds. No wheezing. No rales.   Cardiovascular:      PMI at left midclavicular line. Normal rate. Regular rhythm. Normal S1. Normal S2.      Murmurs: There is no murmur.      No gallop. No click. No rub.   Edema:     Peripheral edema absent.   Abdominal:      General: Bowel sounds are normal.      Palpations: Abdomen is soft. There is no abdominal mass.      Tenderness: There is no abdominal tenderness.   Musculoskeletal:      Cervical back: Normal range of " motion and neck supple. Skin:     General: Skin is warm and dry.   Neurological:      Mental Status: Alert and oriented to person, place, and time.      Cranial Nerves: No cranial nerve deficit.         Lab Results   Component Value Date     09/20/2022    K 4.3 09/20/2022     09/20/2022    CO2 24.4 09/20/2022    BUN 9 09/20/2022    CREATININE 0.67 09/20/2022    GLUCOSE 90 09/20/2022    CALCIUM 9.5 09/20/2022    AST 45 (H) 09/20/2022    ALT 29 09/20/2022    ALKPHOS 64 09/20/2022     No results found for: CKTOTAL  Lab Results   Component Value Date    WBC 7.14 07/25/2022    HGB 13.3 07/25/2022    HCT 40.1 07/25/2022     07/25/2022     No results found for: INR  No results found for: MG  Lab Results   Component Value Date    TSH 3.100 07/25/2022    TRIG 61 07/11/2017    HDL 85 07/11/2017    LDL 89 07/11/2017            ECG 12 Lead    Date/Time: 10/17/2022 10:16 AM  Performed by: Elia Tee MD  Authorized by: Elia Tee MD   Comparison: compared with previous ECG from 8/8/2022  Similar to previous ECG  Comparison to previous ECG: There is no significant change from before except for the heart rate which was slow previously.  Rhythm: sinus rhythm  Conduction: conduction normal  ST Segments: ST segments normal  T Waves: T waves normal                  Assessment & Plan :   Diagnosis Plan   1. Precordial pain  Stress Test With Myocardial Perfusion (1 Day)   2. Dyspnea on exertion     3. Essential hypertension            Recommendations:  Orders Placed This Encounter   Procedures   • Stress Test With Myocardial Perfusion (1 Day)   • ECG 12 Lead        1. Since her blood pressure is running low, I have asked her to hold off on the clonidine for now and continue with lisinopril, propranolol and HCTZ.  2. We will go ahead and start her on enteric-coated aspirin 81 mg daily.  3. We will evaluate her chest pains further with a stress sestamibi study.  4. I have discussed with her about the results  of the echo Doppler study and told her that her LV function is normal and there are no significant valvular abnormalities noted.  5. I have asked her to keep a check on the blood pressure twice a day and bring it back next visit.    Return in about 5 weeks (around 11/21/2022).    As always, Dr. Barksdale  I appreciate very much the opportunity to participate in the cardiovascular care of your patients.      With Best Regards,    Elia Tee MD, [facility]    Dragon disclaimer:  Much of this encounter note is an electronic transcription/translation of spoken language to printed text. The electronic translation of spoken language may permit erroneous, or at times, nonsensical words or phrases to be inadvertently transcribed; Although I have reviewed the note for such errors, some may still exist.

## 2022-10-20 ENCOUNTER — PATIENT ROUNDING (BHMG ONLY) (OUTPATIENT)
Dept: CARDIOLOGY | Facility: CLINIC | Age: 52
End: 2022-10-20

## 2022-10-20 NOTE — PROGRESS NOTES
October 20, 2022    Hello, may I speak with Isi Boltno?    My name is Payton      I am  with [facility]  [facility]  [address] [place] [address] [phone].    Before we get started may I verify your date of birth? 1970    I am calling to officially welcome you to our practice and ask about your recent visit. Is this a good time to talk? yes    Tell me about your visit with us. What things went well? apt went well.        We're always looking for ways to make our patients' experiences even better. Do you have recommendations on ways we may improve?  no    Overall were you satisfied with your first visit to our practice? yes       I appreciate you taking the time to speak with me today. Is there anything else I can do for you? no      Thank you, and have a great day.

## 2022-11-08 ENCOUNTER — HOSPITAL ENCOUNTER (OUTPATIENT)
Dept: NUCLEAR MEDICINE | Facility: HOSPITAL | Age: 52
Discharge: HOME | End: 2022-11-08

## 2022-11-08 ENCOUNTER — HOSPITAL ENCOUNTER (OUTPATIENT)
Dept: CARDIOLOGY | Facility: HOSPITAL | Age: 52
Discharge: HOME | End: 2022-11-08

## 2022-11-08 DIAGNOSIS — R07.2 PRECORDIAL PAIN: ICD-10-CM

## 2022-11-08 LAB
BH CV NUCLEAR PRIOR STUDY: 3
BH CV REST NUCLEAR ISOTOPE DOSE: 8.1 MCI
BH CV STRESS BP STAGE 1: NORMAL
BH CV STRESS BP STAGE 2: NORMAL
BH CV STRESS DURATION MIN STAGE 1: 3
BH CV STRESS DURATION MIN STAGE 2: 3
BH CV STRESS DURATION MIN STAGE 3: 1
BH CV STRESS DURATION SEC STAGE 1: 0
BH CV STRESS DURATION SEC STAGE 2: 0
BH CV STRESS DURATION SEC STAGE 3: 26
BH CV STRESS GRADE STAGE 1: 10
BH CV STRESS GRADE STAGE 2: 12
BH CV STRESS GRADE STAGE 3: 14
BH CV STRESS HR STAGE 1: 121
BH CV STRESS HR STAGE 2: 141
BH CV STRESS HR STAGE 3: 151
BH CV STRESS METS STAGE 1: 5
BH CV STRESS METS STAGE 2: 7.5
BH CV STRESS METS STAGE 3: 10
BH CV STRESS NUCLEAR ISOTOPE DOSE: 28.3 MCI
BH CV STRESS PROTOCOL 1: NORMAL
BH CV STRESS RECOVERY BP: NORMAL MMHG
BH CV STRESS RECOVERY HR: 93 BPM
BH CV STRESS SPEED STAGE 1: 1.7
BH CV STRESS SPEED STAGE 2: 2.5
BH CV STRESS SPEED STAGE 3: 3.4
BH CV STRESS STAGE 1: 1
BH CV STRESS STAGE 2: 2
BH CV STRESS STAGE 3: 3
LV EF NUC BP: 67 %
MAXIMAL PREDICTED HEART RATE: 167 BPM
PERCENT MAX PREDICTED HR: 90.42 %
STRESS BASELINE BP: NORMAL MMHG
STRESS BASELINE HR: 85 BPM
STRESS PERCENT HR: 106 %
STRESS POST ESTIMATED WORKLOAD: 10.1 METS
STRESS POST EXERCISE DUR MIN: 7 MIN
STRESS POST EXERCISE DUR SEC: 26 SEC
STRESS POST PEAK BP: NORMAL MMHG
STRESS POST PEAK HR: 151 BPM
STRESS TARGET HR: 142 BPM

## 2022-11-08 PROCEDURE — A9500 TC99M SESTAMIBI: HCPCS | Performed by: INTERNAL MEDICINE

## 2022-11-08 PROCEDURE — 93017 CV STRESS TEST TRACING ONLY: CPT

## 2022-11-08 PROCEDURE — 78452 HT MUSCLE IMAGE SPECT MULT: CPT | Performed by: INTERNAL MEDICINE

## 2022-11-08 PROCEDURE — 78452 HT MUSCLE IMAGE SPECT MULT: CPT

## 2022-11-08 PROCEDURE — 0 TECHNETIUM SESTAMIBI: Performed by: INTERNAL MEDICINE

## 2022-11-08 PROCEDURE — 93018 CV STRESS TEST I&R ONLY: CPT | Performed by: INTERNAL MEDICINE

## 2022-11-08 RX ADMIN — TECHNETIUM TC 99M SESTAMIBI 1 DOSE: 1 INJECTION INTRAVENOUS at 07:52

## 2022-11-08 RX ADMIN — TECHNETIUM TC 99M SESTAMIBI 1 DOSE: 1 INJECTION INTRAVENOUS at 08:55

## 2022-11-17 ENCOUNTER — OFFICE VISIT (OUTPATIENT)
Dept: CARDIOLOGY | Facility: CLINIC | Age: 52
End: 2022-11-17

## 2022-11-17 VITALS
DIASTOLIC BLOOD PRESSURE: 100 MMHG | BODY MASS INDEX: 23.13 KG/M2 | RESPIRATION RATE: 16 BRPM | HEIGHT: 65 IN | HEART RATE: 84 BPM | SYSTOLIC BLOOD PRESSURE: 131 MMHG | WEIGHT: 138.8 LBS

## 2022-11-17 DIAGNOSIS — R00.2 PALPITATIONS: Primary | ICD-10-CM

## 2022-11-17 DIAGNOSIS — R07.2 PRECORDIAL PAIN: ICD-10-CM

## 2022-11-17 PROCEDURE — 99214 OFFICE O/P EST MOD 30 MIN: CPT | Performed by: PHYSICIAN ASSISTANT

## 2022-11-17 RX ORDER — BUSPIRONE HYDROCHLORIDE 15 MG/1
15 TABLET ORAL 2 TIMES DAILY
Qty: 180 TABLET | Refills: 1 | Status: SHIPPED | OUTPATIENT
Start: 2022-11-17

## 2022-11-17 RX ORDER — METOPROLOL SUCCINATE 25 MG/1
25 TABLET, EXTENDED RELEASE ORAL DAILY
Qty: 30 TABLET | Refills: 11 | Status: SHIPPED | OUTPATIENT
Start: 2022-11-17

## 2022-11-17 RX ORDER — HYDROCHLOROTHIAZIDE 25 MG/1
25 TABLET ORAL DAILY
Qty: 90 TABLET | Refills: 3 | Status: SHIPPED | OUTPATIENT
Start: 2022-11-17

## 2022-11-17 NOTE — PROGRESS NOTES
"Chloe Barksdale MD  Isi Bolton  1970  11/17/2022    Patient Active Problem List   Diagnosis   • Essential hypertension   • Tachycardia       Dear Chloe Barksdale MD:    Subjective     History of Present Illness:    Chief Complaint   Patient presents with   • Follow-up     Stress findings   • Chest Pain     unchanged   • Palpitations     same   • Shortness of Breath     Routine activity   • Edema     LE   • Dizziness   • Med Management     verbal       Isi Bolton is a pleasant 52 y.o. female with a past medical history significant for no known coronary artery disease and no history of tobacco abuse.  She does have longstanding history of essential hypertension.  She does have some family history of coronary artery disease with an uncle who had an acute myocardial infarction in his 50s. She comes in for routine cardiology follow up.    Isi comes in after having stress test performed stress test revealed normal color perfusion with no signs of ischemia consistent with a low risk.  Clinically she still reports having regular chest pains that she describes as a pressure in the center of her chest that she reports occurs daily and can last for prolonged periods of time up to 4 hours.  She does report relaxing will help these pains.  She also reports fairly frequent palpitations as well and does have a blood pressure device that can report \"irregular heart rhythm\" and occasionally does get this on her blood pressure device.  Also of note she did bring in a detailed blood pressure log checking her blood pressure at least twice daily with systolic ranging between 120-140 with average roughly around 130.  However, her diastolic is remaining elevated persistently around 90 mmHg systolic and at times greater than 100.      No Known Allergies:      Current Outpatient Medications:   •  ALPRAZolam (XANAX) 1 MG tablet, Take 1 tablet by mouth 3 times daily as directed for 30 days., Disp: 90 tablet, Rfl: 0  •  " busPIRone (BUSPAR) 15 MG tablet, Take 1 tablet by mouth 2 (Two) Times a Day., Disp: 180 tablet, Rfl: 1  •  cloNIDine (CATAPRES) 0.1 MG tablet, Take 1 tablet by mouth 2 (Two) Times a Day. (Patient taking differently: Take 0.1 mg by mouth 2 (Two) Times a Day As Needed.), Disp: 180 tablet, Rfl: 1  •  zinc sulfate (ZINCATE) 220 (50 Zn) MG capsule, Take 1 capsule by mouth twice a day as directed., Disp: 28 capsule, Rfl: 0  •  ALPRAZolam (XANAX) 1 MG tablet, Take 1 tablet by mouth 2 (Two) Times a Day As Needed., Disp: 60 tablet, Rfl: 1  •  cetirizine (zyrTEC) 10 MG tablet, Take 1 tablet by mouth Daily., Disp: 90 tablet, Rfl: 1  •  cloNIDine (CATAPRES) 0.1 MG tablet, Take 1 tablet by mouth 3 (three) times a day as needed., Disp: 60 tablet, Rfl: 1  •  CloNIDine (CATAPRES) 0.1 MG tablet, TAKE 1 TABLET BY MOUTH TWICE A DAY, Disp: 60 tablet, Rfl: 2  •  CloNIDine (CATAPRES) 0.1 MG tablet, Take 1 tablet by mouth 2 (Two) Times a Day., Disp: 60 tablet, Rfl: 5  •  cloNIDine (CATAPRES) 0.1 MG tablet, Take 1 tablet by mouth 2 (Two) Times a Day., Disp: 180 tablet, Rfl: 1  •  hydroCHLOROthiazide (HYDRODIURIL) 25 MG tablet, Take 1 tablet by mouth Daily., Disp: 90 tablet, Rfl: 3  •  lisinopril (PRINIVIL,ZESTRIL) 40 MG tablet, Take 1 tablet by mouth Daily., Disp: 30 tablet, Rfl: 5  •  metoprolol succinate XL (TOPROL-XL) 25 MG 24 hr tablet, Take 1 tablet by mouth Daily., Disp: 30 tablet, Rfl: 11  •  montelukast (Singulair) 10 MG tablet, Take 1 tablet by mouth Every Night at bedtime., Disp: 90 tablet, Rfl: 1  •  pantoprazole (PROTONIX) 40 MG EC tablet, TAKE 1 TABLET BY MOUTH DAILY., Disp: 30 tablet, Rfl: 3  •  pantoprazole (PROTONIX) 40 MG EC tablet, Take 1 tablet by mouth Daily., Disp: 30 tablet, Rfl: 5  •  pantoprazole (PROTONIX) 40 MG EC tablet, Take 1 tablet by mouth Daily., Disp: 30 tablet, Rfl: 5    The following portions of the patient's history were reviewed and updated as appropriate: allergies, current medications, past family  "history, past medical history, past social history, past surgical history and problem list.    Social History     Tobacco Use   • Smoking status: Former     Types: Electronic Cigarette   • Smokeless tobacco: Never   Vaping Use   • Vaping Use: Former   Substance Use Topics   • Alcohol use: Yes   • Drug use: No         Objective   Vitals:    11/17/22 1508   BP: 131/100   Pulse: 84   Resp: 16   Weight: 63 kg (138 lb 12.8 oz)   Height: 165.1 cm (65\")     Body mass index is 23.1 kg/m².    Constitutional:       General: Not in acute distress.     Appearance: Healthy appearance. Well-developed and not in distress. Not diaphoretic.   Eyes:      Conjunctiva/sclera: Conjunctivae normal.      Pupils: Pupils are equal, round, and reactive to light.   HENT:      Head: Normocephalic and atraumatic.   Neck:      Vascular: No carotid bruit or JVD.   Pulmonary:      Effort: Pulmonary effort is normal. No respiratory distress.      Breath sounds: Normal breath sounds.   Cardiovascular:      Normal rate. Regular rhythm.   Skin:     General: Skin is cool.   Neurological:      Mental Status: Alert, oriented to person, place, and time and oriented to person, place and time.         Lab Results   Component Value Date     09/20/2022    K 4.3 09/20/2022     09/20/2022    CO2 24.4 09/20/2022    BUN 9 09/20/2022    CREATININE 0.67 09/20/2022    GLUCOSE 90 09/20/2022    CALCIUM 9.5 09/20/2022    AST 45 (H) 09/20/2022    ALT 29 09/20/2022    ALKPHOS 64 09/20/2022     No results found for: CKTOTAL  Lab Results   Component Value Date    WBC 7.14 07/25/2022    HGB 13.3 07/25/2022    HCT 40.1 07/25/2022     07/25/2022     No results found for: INR  No results found for: MG  Lab Results   Component Value Date    TSH 3.100 07/25/2022    TRIG 61 07/11/2017    HDL 85 07/11/2017    LDL 89 07/11/2017      No results found for: BNP    During this visit the following were done:  Labs Reviewed []    Labs Ordered []    Radiology Reports " Reviewed []    Radiology Ordered []    PCP Records Reviewed []    Referring Provider Records Reviewed []    ER Records Reviewed []    Hospital Records Reviewed []    History Obtained From Family []    Radiology Images Reviewed []    Other Reviewed []    Records Requested []       Procedures    Assessment & Plan    Diagnosis Plan   1. Palpitations  Cardiac Event Monitor      2. Precordial pain  CT Angiogram Coronary               Recommendations:  1. Precordial pain  a. Discussed results of stress test which showed normal myocardial perfusion with no evidence of ischemia.  However given her persistent pains will evaluate further with CT coronary angiogram.  2. Palpitations  a. I will evaluate with 30-day cardiac event monitor  3. Essential hypertension  a. She did have a detailed blood pressure log which showed persistently high diastolic pressure with relatively controlled systolic.  She does report she has checked her blood pressure device accuracy with both other medical grade blood pressure devices and with manual checks.  I will stop her propranolol and start her on metoprolol succinate 25 mg daily.  Continue monitoring blood pressure twice daily.  b. Continue HCTZ, lisinopril.    No follow-ups on file.    As always, I appreciate very much the opportunity to participate in the cardiovascular care of your patients.      With Best Regards,    Petros Silvestre PA-C

## 2022-12-01 ENCOUNTER — HOSPITAL ENCOUNTER (OUTPATIENT)
Dept: CARDIOLOGY | Facility: HOSPITAL | Age: 52
Discharge: HOME | End: 2022-12-01

## 2022-12-01 ENCOUNTER — TRANSCRIBE ORDERS (OUTPATIENT)
Dept: CARDIOLOGY | Facility: HOSPITAL | Age: 52
End: 2022-12-01

## 2022-12-01 DIAGNOSIS — R00.0 TACHYCARDIA: Primary | ICD-10-CM

## 2022-12-01 DIAGNOSIS — R00.0 TACHYCARDIA, UNSPECIFIED: ICD-10-CM

## 2022-12-01 PROCEDURE — 93010 ELECTROCARDIOGRAM REPORT: CPT | Performed by: INTERNAL MEDICINE

## 2022-12-01 PROCEDURE — 93005 ELECTROCARDIOGRAM TRACING: CPT

## 2022-12-02 ENCOUNTER — TRANSCRIBE ORDERS (OUTPATIENT)
Dept: LAB | Facility: HOSPITAL | Age: 52
End: 2022-12-02

## 2022-12-02 ENCOUNTER — LAB (OUTPATIENT)
Dept: LAB | Facility: HOSPITAL | Age: 52
End: 2022-12-02

## 2022-12-02 DIAGNOSIS — R07.9 CHEST PAIN, UNSPECIFIED TYPE: ICD-10-CM

## 2022-12-02 DIAGNOSIS — R07.9 CHEST PAIN, UNSPECIFIED TYPE: Primary | ICD-10-CM

## 2022-12-02 DIAGNOSIS — R00.0 TACHYCARDIA, UNSPECIFIED: Primary | ICD-10-CM

## 2022-12-02 LAB — D DIMER PPP FEU-MCNC: 0.46 MCGFEU/ML (ref 0–0.53)

## 2022-12-02 PROCEDURE — 36415 COLL VENOUS BLD VENIPUNCTURE: CPT

## 2022-12-02 PROCEDURE — 85379 FIBRIN DEGRADATION QUANT: CPT

## 2022-12-02 PROCEDURE — 93005 ELECTROCARDIOGRAM TRACING: CPT | Performed by: FAMILY MEDICINE

## 2022-12-04 LAB
QT INTERVAL: 384 MS
QTC INTERVAL: 440 MS

## 2022-12-16 ENCOUNTER — TELEPHONE (OUTPATIENT)
Dept: CARDIOLOGY | Facility: CLINIC | Age: 52
End: 2022-12-16

## 2022-12-16 NOTE — TELEPHONE ENCOUNTER
HUB can read:     Called pt and advised her she can come by and get heart monitor placed in the office at her convenience.      No pre cert needed   Referance# [id number]

## 2022-12-19 ENCOUNTER — CLINICAL SUPPORT (OUTPATIENT)
Dept: CARDIOLOGY | Facility: CLINIC | Age: 52
End: 2022-12-19

## 2022-12-19 PROCEDURE — 93270 REMOTE 30 DAY ECG REV/REPORT: CPT | Performed by: INTERNAL MEDICINE

## 2022-12-19 RX ORDER — CEPHALEXIN 500 MG/1
500 CAPSULE ORAL 2 TIMES DAILY
Qty: 20 CAPSULE | Refills: 0 | Status: SHIPPED | OUTPATIENT
Start: 2022-12-19 | End: 2022-12-29

## 2023-01-23 ENCOUNTER — TREATMENT (OUTPATIENT)
Dept: CARDIOLOGY | Facility: CLINIC | Age: 53
End: 2023-01-23
Payer: COMMERCIAL

## 2023-01-23 DIAGNOSIS — R00.2 PALPITATIONS: ICD-10-CM

## 2023-01-23 PROCEDURE — 93272 ECG/REVIEW INTERPRET ONLY: CPT | Performed by: INTERNAL MEDICINE

## 2023-02-10 ENCOUNTER — TELEPHONE (OUTPATIENT)
Dept: CARDIOLOGY | Facility: CLINIC | Age: 53
End: 2023-02-10
Payer: COMMERCIAL

## 2023-02-10 NOTE — TELEPHONE ENCOUNTER
Provider attempted [id number] on CTA but insurance denied due to her ASCVD Risk Score being 1.3%

## 2023-04-13 ENCOUNTER — HOSPITAL ENCOUNTER (OUTPATIENT)
Dept: CT IMAGING | Facility: HOSPITAL | Age: 53
Discharge: HOME | End: 2023-04-13
Admitting: PHYSICIAN ASSISTANT
Payer: COMMERCIAL

## 2023-04-13 VITALS
DIASTOLIC BLOOD PRESSURE: 78 MMHG | OXYGEN SATURATION: 100 % | HEIGHT: 65 IN | HEART RATE: 60 BPM | BODY MASS INDEX: 23.03 KG/M2 | TEMPERATURE: 97.1 F | WEIGHT: 138.2 LBS | RESPIRATION RATE: 18 BRPM | SYSTOLIC BLOOD PRESSURE: 122 MMHG

## 2023-04-13 DIAGNOSIS — R07.2 PRECORDIAL PAIN: ICD-10-CM

## 2023-04-13 PROCEDURE — 25510000001 IOPAMIDOL PER 1 ML: Performed by: PHYSICIAN ASSISTANT

## 2023-04-13 PROCEDURE — 75574 CT ANGIO HRT W/3D IMAGE: CPT

## 2023-04-13 RX ORDER — METOPROLOL TARTRATE 50 MG/1
50 TABLET, FILM COATED ORAL ONCE AS NEEDED
Status: DISCONTINUED | OUTPATIENT
Start: 2023-04-13 | End: 2023-04-13

## 2023-04-13 RX ORDER — NITROGLYCERIN 0.4 MG/1
0.4 TABLET SUBLINGUAL
Status: COMPLETED | OUTPATIENT
Start: 2023-04-13 | End: 2023-04-13

## 2023-04-13 RX ORDER — METOPROLOL TARTRATE 50 MG/1
100 TABLET, FILM COATED ORAL ONCE AS NEEDED
Status: DISCONTINUED | OUTPATIENT
Start: 2023-04-13 | End: 2023-04-13

## 2023-04-13 RX ORDER — SODIUM CHLORIDE 0.9 % (FLUSH) 0.9 %
3 SYRINGE (ML) INJECTION EVERY 12 HOURS SCHEDULED
Status: DISCONTINUED | OUTPATIENT
Start: 2023-04-13 | End: 2023-04-14 | Stop reason: HOSPADM

## 2023-04-13 RX ORDER — SODIUM CHLORIDE 0.9 % (FLUSH) 0.9 %
10 SYRINGE (ML) INJECTION AS NEEDED
Status: DISCONTINUED | OUTPATIENT
Start: 2023-04-13 | End: 2023-04-14 | Stop reason: HOSPADM

## 2023-04-13 RX ORDER — NITROGLYCERIN 0.4 MG/1
TABLET SUBLINGUAL
Status: COMPLETED
Start: 2023-04-13 | End: 2023-04-13

## 2023-04-13 RX ORDER — LIDOCAINE HYDROCHLORIDE 10 MG/ML
5 INJECTION, SOLUTION EPIDURAL; INFILTRATION; INTRACAUDAL; PERINEURAL AS NEEDED
Status: CANCELLED | OUTPATIENT
Start: 2023-04-13

## 2023-04-13 RX ADMIN — NITROGLYCERIN 0.4 MG: 0.4 TABLET SUBLINGUAL at 12:03

## 2023-04-13 RX ADMIN — IOPAMIDOL 65 ML: 755 INJECTION, SOLUTION INTRAVENOUS at 12:23

## 2023-04-27 ENCOUNTER — TELEPHONE (OUTPATIENT)
Dept: CARDIOLOGY | Facility: CLINIC | Age: 53
End: 2023-04-27
Payer: COMMERCIAL

## 2023-04-27 NOTE — TELEPHONE ENCOUNTER
Caller: Isi Bolton    Relationship: Self    Best call back number: [phone]    What is the best time to reach you: AFTERNOON    Who are you requesting to speak with (clinical staff, provider,  specific staff member): CLINICAL    What was the call regarding: PT IS WANTING TO SCHEDULE A FOLLOW UP APPT. I DIDN'T SEE CLEAR FOLLOW UP INSTRUCTIONS IN THE CHART. PLEASE CALL PATIENT IN REGARDS TO IF A FOLLOW UP IS NEEDED OR NOT. PT ALSO WANTED TO SEE IF SHE COULD GET THE RESULTS OF THE ANGIOGRAM COMPLETED ON 4.13.23. 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     Do you require a callback: YES

## 2023-04-27 NOTE — TELEPHONE ENCOUNTER
Hub can read.     Called pt to make her the next available apt and to advise her they will go over her test at that f/up. No answer JORDAN.

## 2023-05-01 ENCOUNTER — OFFICE VISIT (OUTPATIENT)
Dept: CARDIOLOGY | Facility: CLINIC | Age: 53
End: 2023-05-01
Payer: COMMERCIAL

## 2023-05-01 VITALS
HEART RATE: 83 BPM | RESPIRATION RATE: 18 BRPM | DIASTOLIC BLOOD PRESSURE: 68 MMHG | OXYGEN SATURATION: 97 % | WEIGHT: 136 LBS | HEIGHT: 65 IN | BODY MASS INDEX: 22.66 KG/M2 | SYSTOLIC BLOOD PRESSURE: 100 MMHG

## 2023-05-01 DIAGNOSIS — R06.09 DYSPNEA ON EXERTION: Primary | ICD-10-CM

## 2023-05-01 PROCEDURE — 99214 OFFICE O/P EST MOD 30 MIN: CPT | Performed by: INTERNAL MEDICINE

## 2023-05-01 RX ORDER — LISINOPRIL 20 MG/1
20 TABLET ORAL DAILY
COMMUNITY
Start: 2023-04-18

## 2023-05-01 RX ORDER — MONTELUKAST SODIUM 10 MG/1
10 TABLET ORAL DAILY
Qty: 30 TABLET | Refills: 0 | COMMUNITY
Start: 2023-04-18 | End: 2023-05-18

## 2023-05-01 NOTE — PROGRESS NOTES
Chloe Barksdale MD  Isi Bolton  1970  05/01/2023    Patient Active Problem List   Diagnosis   • Essential hypertension   • Tachycardia       Dear Chloe Barksdale MD:    Subjective     Isi Bolton is a 52 y.o. female with the problems as listed above, presents    Chief complaint: Follow-up for complaints of shortness of breath and dizziness and to review the recent test results.    History of Present Illness: Isi is a pleasant 52-year-old  female with complaints of intermittent episodes of shortness of breath and dizziness and fatigue for which she underwent cardiac evaluation recently initially with a nuclear stress test and then CT coronary angiogram.  She also had an event monitor.  She is here to review the test results.  On today's visit she is still has some intermittent shortness of breath.  Her CT coronary angiogram was essentially normal with 0 calcium score and no evidence of any coronary artery disease.  She apparently had mild superficial myocardial bridging of LAD otherwise everything else was normal.  Her echo Doppler study in July 2022 was normal as well except for mild tricuspid regurgitation with no evidence of pulmonary hypertension.  Her event monitor revealed predominantly sinus rhythm with no significant cardiac arrhythmias.  The numerous entries in the diary regarding fatigue, dizziness, lightheadedness, chest pains and fluttering correlated with sinus rhythm ranging from 80s to 100s with no diagnostic ST-T wave changes.        CT Angiogram Coronary [[id number]] (Order [id number])  Order  Status: Final result     Study Notes       Melissa Carl on 4/13/2023 12:24 PM EDT   Chest pain/anginal equiv, ECGs and troponins normal, Precordial pain; pt c/o soa, chest pain/pressure since having covid in Aug 2022; echo showed 2 leaky valves; hx of hypertension; cervical cancer several years ago; family history of heart disease; 65 ml isovue 370 given   Appointment  Information      Display Notes    [facility] Patient returned call she stated missed on Monday. She also stated there was not a voicemail that showed on her phone. Patient said the last communication was auth for services went into a P2P status and sent to her doctor.  Call back [phone]                Radiology Images    Study Result    Narrative & Impression   CT ANGIOGRAM CORONARY     Date of Exam: 4/13/2023 11:40 AM EDT     Indication: Chest pain/anginal equiv, ECGs and troponins normal.     Comparison: None available.     Technique: Exam consists of initial axial 3 mm unenhanced images through the chest, with computer analysis and scoring of coronary artery calcium, subsequent gated post IV contrast 1 mm images using automatic bolus triggering, with 2-D reconstructions of   the major coronary arteries, and curved planar 2-D reconstructions. A total of 65 mL of IV Isovue-370 was administered for the exam. . Automated exposure control and iterative reconstruction methods were used.      Findings:  There is long-standing history of essential hypertension. Intermittent chest pain described as central chest pressure, some palpitations. Recent unremarkable stress test.     Total calcium score is 0.0. No detectable coronary artery calcium.     Axial angiographic images show the right and left coronary arteries to arise normally from the right and left ostia. Right coronary artery is a normal sized normal-appearing vessel and is dominant, with a fairly large posterior lateral branch supplying   the inferolateral left ventricle as well.     Left main and LAD are normal sized normal-appearing vessels with no evidence of significant stenosis. There appears to be mild superficial myocardial bridging of the mid LAD.     Left circumflex is a normal sized normal-appearing vessel, with normal-appearing obtuse marginal branch.      Curved planar reconstructions of the RCA show normal appearing vessel with no evidence of plaque or  stenosis.     Curved planar reconstructions of the left main and LAD show normal appearing vessels. There is thin linear misregistration artifact of the distal LAD, but no evidence of potential stenosis elsewhere.  Mid LAD is very superficial to the myocardium.     Curved planar reconstructions of the left circumflex appear normal.     Elsewhere at the level of this scan, maximal transverse luminal diameter the ascending aorta is 3.4 cm. No aneurysm or dissection is seen. No thrombus is seen in the atrial appendages. No significant anomaly of pulmonary venous return is identified.   Pulmonary arteries at the level of this scan appear normally contrast opacified with no evidence of embolic disease. No pericardial or pleural effusion or mediastinal adenopathy is seen. There is a mild to moderate pectus excavatum deformity with Andrew   index of up to 3.3. There is diffuse fatty liver change. There is appears to be an incidental normal variant gastric diverticulum. Included portions of the lungs appear clear.        IMPRESSION:  Impression:     1. Total calcium score of 0.0. No detectable coronary artery calcium.     2. Minimal, if any, myocardial bridging of the mid LAD, incidentally noted. No evidence of significant coronary artery anomaly.     3. No evidence of hemodynamically significant coronary artery stenosis.     4. Incidentally noted pectus excavatum.     Electronically Signed: Alexys Amor    4/17/2023 8:01 AM EDT            Allergies   Allergen Reactions   • Metoprolol Other (See Comments)     Pt reports no longer taking; reports that it caused heart rate to increase into 170's; and elevated SBP >200     :    Current Outpatient Medications:   •  ALPRAZolam (XANAX) 1 MG tablet, Take 1 tablet by mouth 3 times a day., Disp: 90 tablet, Rfl: 0  •  busPIRone (BUSPAR) 15 MG tablet, Take 1 tablet by mouth 2 (Two) Times a Day., Disp: 180 tablet, Rfl: 1  •  cetirizine (zyrTEC) 10 MG tablet, Take 1 tablet by mouth Daily.,  "Disp: 90 tablet, Rfl: 1  •  cloNIDine (CATAPRES) 0.1 MG tablet, Take 1 tablet by mouth 2 (Two) Times a Day. (Patient taking differently: Take 1 tablet by mouth 2 (Two) Times a Day As Needed.), Disp: 180 tablet, Rfl: 1  •  hydroCHLOROthiazide (HYDRODIURIL) 25 MG tablet, Take 1 tablet by mouth Daily., Disp: 90 tablet, Rfl: 3  •  hydrOXYzine (ATARAX) 10 MG tablet, Take 1 tablet by mouth 3 (Three) Times a Day As Needed for anxiety/sleep., Disp: 90 tablet, Rfl: 1  •  lisinopril (PRINIVIL,ZESTRIL) 20 MG tablet, Take 1 tablet by mouth Daily., Disp: , Rfl:   •  montelukast (SINGULAIR) 10 MG tablet, Take 1 tablet by mouth Daily., Disp: 30 tablet, Rfl: 0  •  pantoprazole (PROTONIX) 40 MG EC tablet, TAKE 1 TABLET BY MOUTH DAILY., Disp: 30 tablet, Rfl: 3  •  propranolol (INDERAL) 40 MG tablet, Take 1 tablet by mouth twice a day for 90 days., Disp: 180 tablet, Rfl: 1    The following portions of the patient's history were reviewed and updated as appropriate: allergies, current medications, past family history, past medical history, past social history, past surgical history and problem list.    Social History     Tobacco Use   • Smoking status: Former     Types: Electronic Cigarette   • Smokeless tobacco: Never   Vaping Use   • Vaping Use: Former   Substance Use Topics   • Alcohol use: Yes   • Drug use: No     Review of Systems   Constitutional: Negative for chills and fever.   HENT: Negative for nosebleeds and sore throat.    Respiratory: Positive for shortness of breath. Negative for cough, hemoptysis and wheezing.    Gastrointestinal: Negative for abdominal pain, hematemesis, hematochezia, melena, nausea and vomiting.   Genitourinary: Negative for dysuria and hematuria.   Neurological: Negative for headaches.     Objective   Vitals:    05/01/23 0847   BP: 100/68   BP Location: Left arm   Patient Position: Sitting   Cuff Size: Adult   Pulse: 83   Resp: 18   SpO2: 97%   Weight: 61.7 kg (136 lb)   Height: 165.1 cm (65\")     Body " mass index is 22.63 kg/m².    Constitutional:       Appearance: Well-developed.   Eyes:      Conjunctiva/sclera: Conjunctivae normal.   HENT:      Head: Normocephalic.   Neck:      Thyroid: No thyromegaly.      Vascular: No JVD.      Trachea: No tracheal deviation.   Pulmonary:      Effort: No respiratory distress.      Breath sounds: Normal breath sounds. No wheezing. No rales.   Cardiovascular:      PMI at left midclavicular line. Normal rate. Regular rhythm. Normal S1. Normal S2.      Murmurs: There is no murmur.      No gallop. No click. No rub.   Pulses:     Intact distal pulses.   Edema:     Peripheral edema absent.   Abdominal:      General: Bowel sounds are normal.      Palpations: Abdomen is soft. There is no abdominal mass.      Tenderness: There is no abdominal tenderness.   Musculoskeletal:      Cervical back: Normal range of motion and neck supple. Skin:     General: Skin is warm and dry.   Neurological:      Mental Status: Alert and oriented to person, place, and time.      Cranial Nerves: No cranial nerve deficit.       Lab Results   Component Value Date     09/20/2022    K 4.3 09/20/2022     09/20/2022    CO2 24.4 09/20/2022    BUN 9 09/20/2022    CREATININE 0.67 09/20/2022    GLUCOSE 90 09/20/2022    CALCIUM 9.5 09/20/2022    AST 45 (H) 09/20/2022    ALT 29 09/20/2022    ALKPHOS 64 09/20/2022     No results found for: CKTOTAL  Lab Results   Component Value Date    WBC 7.14 07/25/2022    HGB 13.3 07/25/2022    HCT 40.1 07/25/2022     07/25/2022     No results found for: INR  No results found for: MG  Lab Results   Component Value Date    TSH 3.100 07/25/2022    TRIG 61 07/11/2017    HDL 85 07/11/2017    LDL 89 07/11/2017        Assessment & Plan    Diagnosis Plan    Dyspnea on exertion probably noncardiac.         Recommendations  1. I have discussed the findings of the CT coronary angiogram, echo Doppler study and event monitor with the patient and reassured her about the heart  not having any significant abnormalities that we can find.  2. We may have to look into noncardiac causes for her symptoms.  I will go ahead and order pulmonary function tests and a VQ scan to rule out any pulmonary causes for her dyspnea.  3. I will refer her to pulmonologist to evaluate further for any pulmonary cause for her dyspnea.    Return in about 3 months (around 8/1/2023).    As always, Chloe Barksdale MD  I appreciate very much the opportunity to participate in the cardiovascular care of your patients. Please do not hesitate to call me with any questions with regards to Isi Bolton's evaluation and management.       With Best Regards,        Elia Tee MD, [facility]    Please note that portions of this note were completed with a voice recognition program.

## 2023-05-01 NOTE — LETTER
May 2, 2023     Chloe Barksdale MD  [facility] [employer]  [address] [address]    Patient: Isi Bolton   YOB: 1970   Date of Visit: 5/1/2023       Dear Dr. Shamir MD:    Thank you for referring Isi Bolton to me for evaluation. Below are the relevant portions of my assessment and plan of care.    If you have questions, please do not hesitate to call me. I look forward to following Isi along with you.         Sincerely,        Elia Tee MD        CC: No Recipients    Elia Tee MD  05/02/23 1823  Sign when Signing Visit  Chloe Barksdale MD  Isi Bolton  1970  05/01/2023    Patient Active Problem List   Diagnosis   • Essential hypertension   • Tachycardia       Dear Chloe Barksdale MD:    Subjective      Isi Bolton is a 52 y.o. female with the problems as listed above, presents    Chief complaint: Follow-up for complaints of shortness of breath and dizziness and to review the recent test results.    History of Present Illness: Isi is a pleasant 52-year-old  female with complaints of intermittent episodes of shortness of breath and dizziness and fatigue for which she underwent cardiac evaluation recently initially with a nuclear stress test and then CT coronary angiogram.  She also had an event monitor.  She is here to review the test results.  On today's visit she is still has some intermittent shortness of breath.  Her CT coronary angiogram was essentially normal with 0 calcium score and no evidence of any coronary artery disease.  She apparently had mild superficial myocardial bridging of LAD otherwise everything else was normal.  Her echo Doppler study in July 2022 was normal as well except for mild tricuspid regurgitation with no evidence of pulmonary hypertension.  Her event monitor revealed predominantly sinus rhythm with no significant cardiac arrhythmias.  The numerous entries in the diary regarding fatigue, dizziness,  lightheadedness, chest pains and fluttering correlated with sinus rhythm ranging from 80s to 100s with no diagnostic ST-T wave changes.        CT Angiogram Coronary [[id number]] (Order [id number])  Order  Status: Final result     Study Notes       Melissa Carl ALE on 4/13/2023 12:24 PM EDT   Chest pain/anginal equiv, ECGs and troponins normal, Precordial pain; pt c/o soa, chest pain/pressure since having covid in Aug 2022; echo showed 2 leaky valves; hx of hypertension; cervical cancer several years ago; family history of heart disease; 65 ml isovue 370 given   Appointment Information      Display Notes    [facility] Patient returned call she stated missed on Monday. She also stated there was not a voicemail that showed on her phone. Patient said the last communication was auth for services went into a P2P status and sent to her doctor.  Call back [phone]                Radiology Images    Study Result    Narrative & Impression   CT ANGIOGRAM CORONARY     Date of Exam: 4/13/2023 11:40 AM EDT     Indication: Chest pain/anginal equiv, ECGs and troponins normal.     Comparison: None available.     Technique: Exam consists of initial axial 3 mm unenhanced images through the chest, with computer analysis and scoring of coronary artery calcium, subsequent gated post IV contrast 1 mm images using automatic bolus triggering, with 2-D reconstructions of   the major coronary arteries, and curved planar 2-D reconstructions. A total of 65 mL of IV Isovue-370 was administered for the exam. . Automated exposure control and iterative reconstruction methods were used.      Findings:  There is long-standing history of essential hypertension. Intermittent chest pain described as central chest pressure, some palpitations. Recent unremarkable stress test.     Total calcium score is 0.0. No detectable coronary artery calcium.     Axial angiographic images show the right and left coronary arteries to arise normally from the right and left  ostia. Right coronary artery is a normal sized normal-appearing vessel and is dominant, with a fairly large posterior lateral branch supplying   the inferolateral left ventricle as well.     Left main and LAD are normal sized normal-appearing vessels with no evidence of significant stenosis. There appears to be mild superficial myocardial bridging of the mid LAD.     Left circumflex is a normal sized normal-appearing vessel, with normal-appearing obtuse marginal branch.      Curved planar reconstructions of the RCA show normal appearing vessel with no evidence of plaque or stenosis.     Curved planar reconstructions of the left main and LAD show normal appearing vessels. There is thin linear misregistration artifact of the distal LAD, but no evidence of potential stenosis elsewhere.  Mid LAD is very superficial to the myocardium.     Curved planar reconstructions of the left circumflex appear normal.     Elsewhere at the level of this scan, maximal transverse luminal diameter the ascending aorta is 3.4 cm. No aneurysm or dissection is seen. No thrombus is seen in the atrial appendages. No significant anomaly of pulmonary venous return is identified.   Pulmonary arteries at the level of this scan appear normally contrast opacified with no evidence of embolic disease. No pericardial or pleural effusion or mediastinal adenopathy is seen. There is a mild to moderate pectus excavatum deformity with Andrew   index of up to 3.3. There is diffuse fatty liver change. There is appears to be an incidental normal variant gastric diverticulum. Included portions of the lungs appear clear.        IMPRESSION:  Impression:     1. Total calcium score of 0.0. No detectable coronary artery calcium.     2. Minimal, if any, myocardial bridging of the mid LAD, incidentally noted. No evidence of significant coronary artery anomaly.     3. No evidence of hemodynamically significant coronary artery stenosis.     4. Incidentally noted pectus  matias.     Electronically Signed: Alexys Amor    4/17/2023 8:01 AM EDT            Allergies   Allergen Reactions   • Metoprolol Other (See Comments)     Pt reports no longer taking; reports that it caused heart rate to increase into 170's; and elevated SBP >200     :    Current Outpatient Medications:   •  ALPRAZolam (XANAX) 1 MG tablet, Take 1 tablet by mouth 3 times a day., Disp: 90 tablet, Rfl: 0  •  busPIRone (BUSPAR) 15 MG tablet, Take 1 tablet by mouth 2 (Two) Times a Day., Disp: 180 tablet, Rfl: 1  •  cetirizine (zyrTEC) 10 MG tablet, Take 1 tablet by mouth Daily., Disp: 90 tablet, Rfl: 1  •  cloNIDine (CATAPRES) 0.1 MG tablet, Take 1 tablet by mouth 2 (Two) Times a Day. (Patient taking differently: Take 1 tablet by mouth 2 (Two) Times a Day As Needed.), Disp: 180 tablet, Rfl: 1  •  hydroCHLOROthiazide (HYDRODIURIL) 25 MG tablet, Take 1 tablet by mouth Daily., Disp: 90 tablet, Rfl: 3  •  hydrOXYzine (ATARAX) 10 MG tablet, Take 1 tablet by mouth 3 (Three) Times a Day As Needed for anxiety/sleep., Disp: 90 tablet, Rfl: 1  •  lisinopril (PRINIVIL,ZESTRIL) 20 MG tablet, Take 1 tablet by mouth Daily., Disp: , Rfl:   •  montelukast (SINGULAIR) 10 MG tablet, Take 1 tablet by mouth Daily., Disp: 30 tablet, Rfl: 0  •  pantoprazole (PROTONIX) 40 MG EC tablet, TAKE 1 TABLET BY MOUTH DAILY., Disp: 30 tablet, Rfl: 3  •  propranolol (INDERAL) 40 MG tablet, Take 1 tablet by mouth twice a day for 90 days., Disp: 180 tablet, Rfl: 1    The following portions of the patient's history were reviewed and updated as appropriate: allergies, current medications, past family history, past medical history, past social history, past surgical history and problem list.    Social History     Tobacco Use   • Smoking status: Former     Types: Electronic Cigarette   • Smokeless tobacco: Never   Vaping Use   • Vaping Use: Former   Substance Use Topics   • Alcohol use: Yes   • Drug use: No     Review of Systems   Constitutional: Negative for  "chills and fever.   HENT: Negative for nosebleeds and sore throat.    Respiratory: Positive for shortness of breath. Negative for cough, hemoptysis and wheezing.    Gastrointestinal: Negative for abdominal pain, hematemesis, hematochezia, melena, nausea and vomiting.   Genitourinary: Negative for dysuria and hematuria.   Neurological: Negative for headaches.     Objective    Vitals:    05/01/23 0847   BP: 100/68   BP Location: Left arm   Patient Position: Sitting   Cuff Size: Adult   Pulse: 83   Resp: 18   SpO2: 97%   Weight: 61.7 kg (136 lb)   Height: 165.1 cm (65\")     Body mass index is 22.63 kg/m².    Constitutional:       Appearance: Well-developed.   Eyes:      Conjunctiva/sclera: Conjunctivae normal.   HENT:      Head: Normocephalic.   Neck:      Thyroid: No thyromegaly.      Vascular: No JVD.      Trachea: No tracheal deviation.   Pulmonary:      Effort: No respiratory distress.      Breath sounds: Normal breath sounds. No wheezing. No rales.   Cardiovascular:      PMI at left midclavicular line. Normal rate. Regular rhythm. Normal S1. Normal S2.      Murmurs: There is no murmur.      No gallop. No click. No rub.   Pulses:     Intact distal pulses.   Edema:     Peripheral edema absent.   Abdominal:      General: Bowel sounds are normal.      Palpations: Abdomen is soft. There is no abdominal mass.      Tenderness: There is no abdominal tenderness.   Musculoskeletal:      Cervical back: Normal range of motion and neck supple. Skin:     General: Skin is warm and dry.   Neurological:      Mental Status: Alert and oriented to person, place, and time.      Cranial Nerves: No cranial nerve deficit.       Lab Results   Component Value Date     09/20/2022    K 4.3 09/20/2022     09/20/2022    CO2 24.4 09/20/2022    BUN 9 09/20/2022    CREATININE 0.67 09/20/2022    GLUCOSE 90 09/20/2022    CALCIUM 9.5 09/20/2022    AST 45 (H) 09/20/2022    ALT 29 09/20/2022    ALKPHOS 64 09/20/2022     No results found " for: CKTOTAL  Lab Results   Component Value Date    WBC 7.14 07/25/2022    HGB 13.3 07/25/2022    HCT 40.1 07/25/2022     07/25/2022     No results found for: INR  No results found for: MG  Lab Results   Component Value Date    TSH 3.100 07/25/2022    TRIG 61 07/11/2017    HDL 85 07/11/2017    LDL 89 07/11/2017        Assessment & Plan    Diagnosis Plan    Dyspnea on exertion probably noncardiac.         Recommendations  I have discussed the findings of the CT coronary angiogram, echo Doppler study and event monitor with the patient and reassured her about the heart not having any significant abnormalities that we can find.  We may have to look into noncardiac causes for her symptoms.  I will go ahead and order pulmonary function tests and a VQ scan to rule out any pulmonary causes for her dyspnea.  I will refer her to pulmonologist to evaluate further for any pulmonary cause for her dyspnea.    Return in about 3 months (around 8/1/2023).    As always, Chloe Barksdale MD  I appreciate very much the opportunity to participate in the cardiovascular care of your patients. Please do not hesitate to call me with any questions with regards to Isi Bolton's evaluation and management.       With Best Regards,        Elia Tee MD, [facility]    Please note that portions of this note were completed with a voice recognition program.

## 2023-05-13 LAB — CREAT BLDA-MCNC: 0.9 MG/DL (ref 0.6–1.3)

## 2023-06-09 ENCOUNTER — HOSPITAL ENCOUNTER (OUTPATIENT)
Dept: RESPIRATORY THERAPY | Facility: HOSPITAL | Age: 53
Discharge: HOME | End: 2023-06-09
Payer: COMMERCIAL

## 2023-06-09 ENCOUNTER — HOSPITAL ENCOUNTER (OUTPATIENT)
Dept: NUCLEAR MEDICINE | Facility: HOSPITAL | Age: 53
Discharge: HOME | End: 2023-06-09
Payer: COMMERCIAL

## 2023-06-09 DIAGNOSIS — R06.09 DYSPNEA ON EXERTION: ICD-10-CM

## 2023-06-09 PROCEDURE — 78582 LUNG VENTILAT&PERFUS IMAGING: CPT | Performed by: RADIOLOGY

## 2023-06-09 PROCEDURE — A9540 TC99M MAA: HCPCS | Performed by: INTERNAL MEDICINE

## 2023-06-09 PROCEDURE — 94640 AIRWAY INHALATION TREATMENT: CPT

## 2023-06-09 PROCEDURE — 0 TECHNETIUM TC 99M PENTETATE INHALER: Performed by: INTERNAL MEDICINE

## 2023-06-09 PROCEDURE — 94060 EVALUATION OF WHEEZING: CPT

## 2023-06-09 PROCEDURE — A9567 TECHNETIUM TC-99M AEROSOL: HCPCS | Performed by: INTERNAL MEDICINE

## 2023-06-09 PROCEDURE — 0 TECHNETIUM ALBUMIN AGGREGATED: Performed by: INTERNAL MEDICINE

## 2023-06-09 PROCEDURE — 94726 PLETHYSMOGRAPHY LUNG VOLUMES: CPT

## 2023-06-09 PROCEDURE — 78582 LUNG VENTILAT&PERFUS IMAGING: CPT

## 2023-06-09 RX ORDER — ALBUTEROL SULFATE 2.5 MG/3ML
2.5 SOLUTION RESPIRATORY (INHALATION) ONCE
Status: COMPLETED | OUTPATIENT
Start: 2023-06-09 | End: 2023-06-09

## 2023-06-09 RX ADMIN — ALBUTEROL SULFATE 2.5 MG: 2.5 SOLUTION RESPIRATORY (INHALATION) at 09:30

## 2023-06-09 RX ADMIN — KIT FOR THE PREPARATION OF TECHNETIUM TC 99M PENTETATE 1 DOSE: 20 INJECTION, POWDER, LYOPHILIZED, FOR SOLUTION INTRAVENOUS; RESPIRATORY (INHALATION) at 11:39

## 2023-06-09 RX ADMIN — KIT FOR THE PREPARATION OF TECHNETIUM TC 99M ALBUMIN AGGREGATED 1 DOSE: 2.5 INJECTION, POWDER, FOR SOLUTION INTRAVENOUS at 11:53

## 2023-06-13 ENCOUNTER — LAB (OUTPATIENT)
Dept: LAB | Facility: HOSPITAL | Age: 53
End: 2023-06-13
Payer: COMMERCIAL

## 2023-06-13 ENCOUNTER — OFFICE VISIT (OUTPATIENT)
Dept: PULMONOLOGY | Facility: CLINIC | Age: 53
End: 2023-06-13
Payer: COMMERCIAL

## 2023-06-13 VITALS
BODY MASS INDEX: 22.49 KG/M2 | DIASTOLIC BLOOD PRESSURE: 90 MMHG | RESPIRATION RATE: 18 BRPM | HEART RATE: 73 BPM | OXYGEN SATURATION: 98 % | HEIGHT: 65 IN | WEIGHT: 135 LBS | TEMPERATURE: 98 F | SYSTOLIC BLOOD PRESSURE: 128 MMHG

## 2023-06-13 DIAGNOSIS — R06.02 SHORTNESS OF BREATH: ICD-10-CM

## 2023-06-13 DIAGNOSIS — R53.83 FATIGUE, UNSPECIFIED TYPE: ICD-10-CM

## 2023-06-13 DIAGNOSIS — R53.83 FATIGUE, UNSPECIFIED TYPE: Primary | ICD-10-CM

## 2023-06-13 LAB
BASOPHILS # BLD AUTO: 0.08 10*3/MM3 (ref 0–0.2)
BASOPHILS NFR BLD AUTO: 1.2 % (ref 0–1.5)
DEPRECATED RDW RBC AUTO: 46.3 FL (ref 37–54)
EOSINOPHIL # BLD AUTO: 0.14 10*3/MM3 (ref 0–0.4)
EOSINOPHIL NFR BLD AUTO: 2.2 % (ref 0.3–6.2)
ERYTHROCYTE [DISTWIDTH] IN BLOOD BY AUTOMATED COUNT: 11.7 % (ref 12.3–15.4)
HCT VFR BLD AUTO: 37.4 % (ref 34–46.6)
HGB BLD-MCNC: 12.1 G/DL (ref 12–15.9)
IMM GRANULOCYTES # BLD AUTO: 0.01 10*3/MM3 (ref 0–0.05)
IMM GRANULOCYTES NFR BLD AUTO: 0.2 % (ref 0–0.5)
LYMPHOCYTES # BLD AUTO: 2.66 10*3/MM3 (ref 0.7–3.1)
LYMPHOCYTES NFR BLD AUTO: 41.4 % (ref 19.6–45.3)
MCH RBC QN AUTO: 34.2 PG (ref 26.6–33)
MCHC RBC AUTO-ENTMCNC: 32.4 G/DL (ref 31.5–35.7)
MCV RBC AUTO: 105.6 FL (ref 79–97)
MONOCYTES # BLD AUTO: 0.46 10*3/MM3 (ref 0.1–0.9)
MONOCYTES NFR BLD AUTO: 7.2 % (ref 5–12)
NEUTROPHILS NFR BLD AUTO: 3.08 10*3/MM3 (ref 1.7–7)
NEUTROPHILS NFR BLD AUTO: 47.8 % (ref 42.7–76)
NRBC BLD AUTO-RTO: 0 /100 WBC (ref 0–0.2)
PLATELET # BLD AUTO: 289 10*3/MM3 (ref 140–450)
PMV BLD AUTO: 8.8 FL (ref 6–12)
RBC # BLD AUTO: 3.54 10*6/MM3 (ref 3.77–5.28)
WBC NRBC COR # BLD: 6.43 10*3/MM3 (ref 3.4–10.8)

## 2023-06-13 PROCEDURE — 86037 ANCA TITER EACH ANTIBODY: CPT

## 2023-06-13 PROCEDURE — 85025 COMPLETE CBC W/AUTO DIFF WBC: CPT

## 2023-06-13 PROCEDURE — 86038 ANTINUCLEAR ANTIBODIES: CPT

## 2023-06-13 PROCEDURE — 36415 COLL VENOUS BLD VENIPUNCTURE: CPT

## 2023-06-13 PROCEDURE — 82785 ASSAY OF IGE: CPT

## 2023-06-13 PROCEDURE — 83516 IMMUNOASSAY NONANTIBODY: CPT

## 2023-06-13 PROCEDURE — 86431 RHEUMATOID FACTOR QUANT: CPT

## 2023-06-13 RX ORDER — ALBUTEROL SULFATE 90 UG/1
2 AEROSOL, METERED RESPIRATORY (INHALATION) EVERY 4 HOURS PRN
Qty: 18 G | Refills: 11 | Status: SHIPPED | OUTPATIENT
Start: 2023-06-13

## 2023-06-13 NOTE — PROGRESS NOTES
Subjective      Chief Complaint  Shortness of Breath    Subjective      History of Present Illness  Isi Bolton is a 52 y.o. female who presents today to [facility] with past medical history of essential hypertension and crohn's disease who presents today for Shortness of Breath. This visit is a initial evaluation  appointment.     Shortness of Breath:  Patient reports that she has had 2 different episodes of COVID in which she most recently had it this past August. She states that since she had COVID she feels that it has effected her energy levels and breathing. She states that she feels short of breath during conversation and with exertion. She feels that her breathing is progressively getting worse. She has noticed some wheezing that is worse with expiration. She also has a dry cough that is worse with changes in season. She states that she has a significant amount of nasal drainage. She has been evaluated by cardiology for cardiac causes of her dyspnea on exertion but her workup has been unrevealing. She denies any previous history of smoking but does have secondhand smoke exposure as her father smoked when she was growing up.     She reports that since having COVID she is very fatigued and tires quickly when doing her normal activities that used to not affect her. She states that she is normally a very active person and would walk 8 miles around her hometown with weights in a backpack and around her ankles but is no longer able to do this because of how tired she feels.     Current Outpatient Medications:     ALPRAZolam (Xanax) 1 MG tablet, Take 1 tablet by mouth 3 (Three) Times a Day., Disp: 90 tablet, Rfl: 2    busPIRone (BUSPAR) 15 MG tablet, Take 1 tablet (15 mg total) by mouth in the morning and 1 tablet (15 mg total) before bedtime., Disp: 60 tablet, Rfl: 5    cetirizine (zyrTEC) 10 MG tablet, Take 1 tablet by mouth Daily., Disp: 90 tablet,  "Rfl: 0    cloNIDine (CATAPRES) 0.1 MG tablet, Take 1 tablet by mouth 2 (Two) Times a Day. (Patient taking differently: Take 1 tablet by mouth 2 (Two) Times a Day As Needed.), Disp: 180 tablet, Rfl: 1    hydroCHLOROthiazide (HYDRODIURIL) 25 MG tablet, Take 1 tablet by mouth Daily., Disp: 90 tablet, Rfl: 3    hydrOXYzine (ATARAX) 10 MG tablet, Take 1 tablet by mouth 3 (Three) Times a Day As Needed for anxiety/sleep., Disp: 90 tablet, Rfl: 1    lisinopril (PRINIVIL,ZESTRIL) 20 MG tablet, Take 1 tablet by mouth in the morning and take 1 tablet in the evening., Disp: 180 tablet, Rfl: 3    pantoprazole (PROTONIX) 40 MG EC tablet, TAKE 1 TABLET BY MOUTH DAILY., Disp: 30 tablet, Rfl: 3    propranolol (INDERAL) 40 MG tablet, Take 1 tablet by mouth twice a day for 90 days., Disp: 180 tablet, Rfl: 1    Vortioxetine HBr (Trintellix) 20 MG tablet, Take 1 tablet (20 mg total) by mouth 1 time each day., Disp: 30 tablet, Rfl: 5    albuterol sulfate  (90 Base) MCG/ACT inhaler, Inhale 2 puffs by mouth Every 4 (Four) Hours As Needed for Wheezing., Disp: 18 g, Rfl: 11    montelukast (SINGULAIR) 10 MG tablet, Take 1 tablet by mouth Daily., Disp: 30 tablet, Rfl: 0      Allergies   Allergen Reactions    Metoprolol Other (See Comments)     Pt reports no longer taking; reports that it caused heart rate to increase into 170's; and elevated SBP >200    Pt reports no longer taking; reports that it caused heart rate to increase into 170's; and elevated SBP >200       Objective     Objective   Vital Signs:  /90   Pulse 73   Temp 98 °F (36.7 °C) (Temporal)   Resp 18   Ht 165.1 cm (65\")   Wt 61.2 kg (135 lb)   SpO2 98%   BMI 22.47 kg/m²   Estimated body mass index is 22.47 kg/m² as calculated from the following:    Height as of this encounter: 165.1 cm (65\").    Weight as of this encounter: 61.2 kg (135 lb).    BMI is within normal parameters. No other follow-up for BMI required.    Past Medical History:   Diagnosis Date    " Angina pectoris     Crohn disease     Heart disease     Hypertension      Past Surgical History:   Procedure Laterality Date    CHOLECYSTECTOMY      TUBAL ABDOMINAL LIGATION       Social History     Socioeconomic History    Marital status: Single   Tobacco Use    Smoking status: Former     Types: Electronic Cigarette    Smokeless tobacco: Never   Vaping Use    Vaping Use: Former   Substance and Sexual Activity    Alcohol use: Yes    Drug use: No    Sexual activity: Defer      Physical Exam  Constitutional:       General: She is awake.      Appearance: Normal appearance. She is normal weight.   HENT:      Head: Normocephalic and atraumatic.      Nose: Nose normal.      Mouth/Throat:      Mouth: Mucous membranes are moist.      Pharynx: Oropharynx is clear.   Eyes:      Extraocular Movements: Extraocular movements intact.      Conjunctiva/sclera: Conjunctivae normal.      Pupils: Pupils are equal, round, and reactive to light.   Cardiovascular:      Rate and Rhythm: Normal rate and regular rhythm.      Pulses: Normal pulses.      Heart sounds: Normal heart sounds. No murmur heard.    No friction rub. No gallop.   Pulmonary:      Effort: Pulmonary effort is normal. No respiratory distress.      Breath sounds: Normal breath sounds. No wheezing, rhonchi or rales.   Musculoskeletal:         General: Normal range of motion.      Cervical back: Normal range of motion.   Skin:     General: Skin is warm and dry.   Neurological:      General: No focal deficit present.      Mental Status: She is alert and oriented to person, place, and time. Mental status is at baseline.   Psychiatric:         Mood and Affect: Mood normal.         Behavior: Behavior normal. Behavior is cooperative.         Thought Content: Thought content normal.      Result Review :  The following labs and radiology results have been reviewed.    Lab Review:     Reviewed PFT from 06/09/2023          Reviewed VQ scan report from 06/09/2023     Narrative &  Impression   EXAM:    NM Lung Perfusion Scan     EXAM DATE:    6/9/2023 11:49 AM     CLINICAL HISTORY:    Pulmonary embolism (PE) suspected, unknown D-dimer; R06.09-Other forms  of dyspnea     TECHNIQUE:    Nuclear Medicine perfusion images of the lungs were obtained in  multiple projections following injection of Tc99m MAA.     COMPARISON:    No relevant prior studies available.     FINDINGS:    Perfusion:  Unremarkable.  No perfusion defects.     IMPRESSION:    No perfusion defect to suggest pulmonary embolism.     This report was finalized on 6/9/2023 12:12 PM by Dr. Robbi Potter MD.          Reviewed echocardiogram report from 08/18/2022      Assessment / Plan         Assessment   Diagnoses and all orders for this visit:    1. Fatigue, unspecified type (Primary)  2. Shortness of breath  PFT reviewed with no obstruction/restriction identified, but did have some mild bronchodilator response (+3% change) which is not very significant but may have some mild underlying asthma that could have developed post-COVID.   V/Q scan report reviewed with no evidence of perfusion defects to indicate PE.   Will order an albuterol inhaler to use every 4-6 hours as needed.   Will order lab work to evaluate for possible underlying asthma.   Will order lab work to evaluate for underlying autoimmune disease.     -     TASHA With / DsDNA, RNP, Sjogrens A / B, [place]; Future  -     ANCA Panel; Future  -     Rheumatoid Factor; Future  -     albuterol sulfate  (90 Base) MCG/ACT inhaler; Inhale 2 puffs by mouth Every 4 (Four) Hours As Needed for Wheezing.  Dispense: 18 g; Refill: 11  -     IgE Level; Future  -     CBC & Differential; Future    I spent 30 minutes caring for Isi on this date of service. This time includes time spent by me in the following activities:preparing for the visit, reviewing tests, obtaining and/or reviewing a separately obtained history, performing a medically appropriate examination and/or evaluation ,  counseling and educating the patient/family/caregiver, ordering medications, tests, or procedures, documenting information in the medical record, and independently interpreting results and communicating that information with the patient/family/caregiver    Follow Up   Return in about 4 weeks (around 7/11/2023), or if symptoms worsen or fail to improve, for Next scheduled follow up.    Patient was given instructions and counseling regarding her condition or for health maintenance advice. Please see specific information pulled into the AVS if appropriate.       This document has been electronically signed by Miya Ortiz PA-C   June 13, 2023 17:10 EDT    Dictated Utilizing Dragon Dictation: Part of this note may be an electronic transcription/translation of spoken language to printed text using the Dragon Dictation System.

## 2023-06-14 LAB — CHROMATIN AB SERPL-ACNC: 10 IU/ML (ref 0–14)

## 2023-06-15 LAB
ANA SER QL: NEGATIVE
C-ANCA TITR SER IF: NORMAL TITER
MYELOPEROXIDASE AB SER IA-ACNC: <0.2 UNITS (ref 0–0.9)
P-ANCA ATYPICAL TITR SER IF: NORMAL TITER
P-ANCA TITR SER IF: NORMAL TITER
PROTEINASE3 AB SER IA-ACNC: <0.2 UNITS (ref 0–0.9)

## 2023-06-18 LAB — IGE SERPL-ACNC: 5 IU/ML (ref 6–495)

## 2023-08-02 RX ORDER — HYDROCHLOROTHIAZIDE 25 MG/1
25 TABLET ORAL DAILY
Qty: 90 TABLET | Refills: 3 | Status: SHIPPED | OUTPATIENT
Start: 2023-08-02

## 2023-08-21 ENCOUNTER — LAB (OUTPATIENT)
Dept: LAB | Facility: HOSPITAL | Age: 53
End: 2023-08-21
Payer: COMMERCIAL

## 2023-08-21 ENCOUNTER — TRANSCRIBE ORDERS (OUTPATIENT)
Dept: ADMINISTRATIVE | Facility: HOSPITAL | Age: 53
End: 2023-08-21
Payer: COMMERCIAL

## 2023-08-21 DIAGNOSIS — Z13.220 SCREENING CHOLESTEROL LEVEL: Primary | ICD-10-CM

## 2023-08-21 DIAGNOSIS — K50.90 ABDOMINAL PAIN DESPITE THERAPY FOR CROHN'S DISEASE: ICD-10-CM

## 2023-08-21 LAB
ALBUMIN SERPL-MCNC: 5.1 G/DL (ref 3.5–5.2)
ALBUMIN/GLOB SERPL: 1.4 G/DL
ALP SERPL-CCNC: 63 U/L (ref 39–117)
ALT SERPL W P-5'-P-CCNC: 19 U/L (ref 1–33)
ANION GAP SERPL CALCULATED.3IONS-SCNC: 13.1 MMOL/L (ref 5–15)
AST SERPL-CCNC: 25 U/L (ref 1–32)
BASOPHILS # BLD AUTO: 0.09 10*3/MM3 (ref 0–0.2)
BASOPHILS NFR BLD AUTO: 1.2 % (ref 0–1.5)
BILIRUB SERPL-MCNC: 0.4 MG/DL (ref 0–1.2)
BUN SERPL-MCNC: 15 MG/DL (ref 6–20)
BUN/CREAT SERPL: 14.7 (ref 7–25)
CALCIUM SPEC-SCNC: 10.5 MG/DL (ref 8.6–10.5)
CHLORIDE SERPL-SCNC: 94 MMOL/L (ref 98–107)
CHOLEST SERPL-MCNC: 205 MG/DL (ref 0–200)
CO2 SERPL-SCNC: 26.9 MMOL/L (ref 22–29)
CREAT SERPL-MCNC: 1.02 MG/DL (ref 0.57–1)
DEPRECATED RDW RBC AUTO: 44 FL (ref 37–54)
EGFRCR SERPLBLD CKD-EPI 2021: 65.9 ML/MIN/1.73
EOSINOPHIL # BLD AUTO: 0.16 10*3/MM3 (ref 0–0.4)
EOSINOPHIL NFR BLD AUTO: 2.2 % (ref 0.3–6.2)
ERYTHROCYTE [DISTWIDTH] IN BLOOD BY AUTOMATED COUNT: 11.9 % (ref 12.3–15.4)
ERYTHROCYTE [SEDIMENTATION RATE] IN BLOOD: 14 MM/HR (ref 0–30)
GLOBULIN UR ELPH-MCNC: 3.7 GM/DL
GLUCOSE SERPL-MCNC: 96 MG/DL (ref 65–99)
HCT VFR BLD AUTO: 38 % (ref 34–46.6)
HDLC SERPL-MCNC: 104 MG/DL (ref 40–60)
HGB BLD-MCNC: 12.6 G/DL (ref 12–15.9)
IMM GRANULOCYTES # BLD AUTO: 0.01 10*3/MM3 (ref 0–0.05)
IMM GRANULOCYTES NFR BLD AUTO: 0.1 % (ref 0–0.5)
LDLC SERPL CALC-MCNC: 78 MG/DL (ref 0–100)
LDLC/HDLC SERPL: 0.7 {RATIO}
LYMPHOCYTES # BLD AUTO: 2.76 10*3/MM3 (ref 0.7–3.1)
LYMPHOCYTES NFR BLD AUTO: 37.9 % (ref 19.6–45.3)
MCH RBC QN AUTO: 33.3 PG (ref 26.6–33)
MCHC RBC AUTO-ENTMCNC: 33.2 G/DL (ref 31.5–35.7)
MCV RBC AUTO: 100.5 FL (ref 79–97)
MONOCYTES # BLD AUTO: 0.6 10*3/MM3 (ref 0.1–0.9)
MONOCYTES NFR BLD AUTO: 8.2 % (ref 5–12)
NEUTROPHILS NFR BLD AUTO: 3.66 10*3/MM3 (ref 1.7–7)
NEUTROPHILS NFR BLD AUTO: 50.4 % (ref 42.7–76)
NRBC BLD AUTO-RTO: 0 /100 WBC (ref 0–0.2)
PLATELET # BLD AUTO: 277 10*3/MM3 (ref 140–450)
PMV BLD AUTO: 9.4 FL (ref 6–12)
POTASSIUM SERPL-SCNC: 4.3 MMOL/L (ref 3.5–5.2)
PROT SERPL-MCNC: 8.8 G/DL (ref 6–8.5)
RBC # BLD AUTO: 3.78 10*6/MM3 (ref 3.77–5.28)
SODIUM SERPL-SCNC: 134 MMOL/L (ref 136–145)
TRIGL SERPL-MCNC: 142 MG/DL (ref 0–150)
VLDLC SERPL-MCNC: 23 MG/DL (ref 5–40)
WBC NRBC COR # BLD: 7.28 10*3/MM3 (ref 3.4–10.8)

## 2023-08-21 PROCEDURE — 80061 LIPID PANEL: CPT | Performed by: FAMILY MEDICINE

## 2023-08-21 PROCEDURE — 80053 COMPREHEN METABOLIC PANEL: CPT | Performed by: FAMILY MEDICINE

## 2023-08-21 PROCEDURE — 85025 COMPLETE CBC W/AUTO DIFF WBC: CPT | Performed by: FAMILY MEDICINE

## 2023-08-21 PROCEDURE — 85652 RBC SED RATE AUTOMATED: CPT | Performed by: FAMILY MEDICINE

## 2023-08-21 PROCEDURE — 36415 COLL VENOUS BLD VENIPUNCTURE: CPT | Performed by: FAMILY MEDICINE

## 2023-09-18 ENCOUNTER — TRANSCRIBE ORDERS (OUTPATIENT)
Dept: ADMINISTRATIVE | Facility: HOSPITAL | Age: 53
End: 2023-09-18
Payer: COMMERCIAL

## 2023-09-18 DIAGNOSIS — K31.4: Primary | ICD-10-CM

## 2023-09-28 ENCOUNTER — HOSPITAL ENCOUNTER (OUTPATIENT)
Dept: GENERAL RADIOLOGY | Facility: HOSPITAL | Age: 53
Discharge: HOME | End: 2023-09-28
Admitting: INTERNAL MEDICINE
Payer: COMMERCIAL

## 2023-09-28 DIAGNOSIS — K31.4: ICD-10-CM

## 2023-09-28 PROCEDURE — 74240 X-RAY XM UPR GI TRC 1CNTRST: CPT

## 2023-09-28 RX ADMIN — BARIUM SULFATE 183 ML: 960 POWDER, FOR SUSPENSION ORAL at 14:40

## 2023-10-17 ENCOUNTER — HOSPITAL ENCOUNTER (EMERGENCY)
Facility: HOSPITAL | Age: 53
Discharge: HOME | End: 2023-10-17
Attending: STUDENT IN AN ORGANIZED HEALTH CARE EDUCATION/TRAINING PROGRAM | Admitting: EMERGENCY MEDICINE
Payer: COMMERCIAL

## 2023-10-17 ENCOUNTER — APPOINTMENT (OUTPATIENT)
Dept: GENERAL RADIOLOGY | Facility: HOSPITAL | Age: 53
End: 2023-10-17
Payer: COMMERCIAL

## 2023-10-17 VITALS
HEIGHT: 65 IN | DIASTOLIC BLOOD PRESSURE: 89 MMHG | SYSTOLIC BLOOD PRESSURE: 159 MMHG | RESPIRATION RATE: 20 BRPM | OXYGEN SATURATION: 97 % | WEIGHT: 140 LBS | BODY MASS INDEX: 23.32 KG/M2 | TEMPERATURE: 98 F | HEART RATE: 90 BPM

## 2023-10-17 DIAGNOSIS — S69.91XA FINGER INJURY, RIGHT, INITIAL ENCOUNTER: Primary | ICD-10-CM

## 2023-10-17 PROCEDURE — 99282 EMERGENCY DEPT VISIT SF MDM: CPT

## 2023-10-17 PROCEDURE — 73120 X-RAY EXAM OF HAND: CPT

## 2023-10-17 PROCEDURE — 73120 X-RAY EXAM OF HAND: CPT | Performed by: RADIOLOGY

## 2023-10-18 NOTE — ED PROVIDER NOTES
Subjective   History of Present Illness  Patient is a 53-year-old female with no significant past medical history presenting to the ER complaints of right hand injury.  Patient was at work when she was pushing a medication cart and her right pinky finger got stuck in it.  Patient reports pain in swelling.  Patient denies any additional injuries or symptoms at this time.    History provided by:  Patient   used: No        Review of Systems   Constitutional: Negative.  Negative for fever.   HENT: Negative.     Respiratory: Negative.     Cardiovascular: Negative.  Negative for chest pain.   Gastrointestinal: Negative.  Negative for abdominal pain.   Endocrine: Negative.    Genitourinary: Negative.  Negative for dysuria.   Musculoskeletal:  Positive for arthralgias.   Skin: Negative.    Neurological: Negative.    Psychiatric/Behavioral: Negative.     All other systems reviewed and are negative.      Past Medical History:   Diagnosis Date    Angina pectoris     Crohn disease     Heart disease     Hypertension        Allergies   Allergen Reactions    Metoprolol Other (See Comments)     Pt reports no longer taking; reports that it caused heart rate to increase into 170's; and elevated SBP >200    Pt reports no longer taking; reports that it caused heart rate to increase into 170's; and elevated SBP >200       Past Surgical History:   Procedure Laterality Date    CHOLECYSTECTOMY      TUBAL ABDOMINAL LIGATION         Family History   Problem Relation Age of Onset    Hypertension Mother     No Known Problems Father     No Known Problems Sister     Cancer Maternal Uncle     Heart attack Maternal Uncle     Breast cancer Paternal Aunt         50's    Cancer Paternal Uncle     Cancer Maternal Grandmother     Breast cancer Maternal Grandmother         70's    Heart attack Cousin        Social History     Socioeconomic History    Marital status: Single   Tobacco Use    Smoking status: Former     Types: Electronic  Cigarette     Passive exposure: Past    Smokeless tobacco: Never   Vaping Use    Vaping Use: Former   Substance and Sexual Activity    Alcohol use: Not Currently    Drug use: No    Sexual activity: Defer           Objective   Physical Exam  Vitals and nursing note reviewed.   Constitutional:       General: She is not in acute distress.     Appearance: She is well-developed. She is not diaphoretic.   HENT:      Head: Normocephalic and atraumatic.      Right Ear: External ear normal.      Left Ear: External ear normal.      Nose: Nose normal.   Eyes:      Conjunctiva/sclera: Conjunctivae normal.      Pupils: Pupils are equal, round, and reactive to light.   Neck:      Vascular: No JVD.      Trachea: No tracheal deviation.   Cardiovascular:      Rate and Rhythm: Normal rate and regular rhythm.      Heart sounds: Normal heart sounds. No murmur heard.  Pulmonary:      Effort: Pulmonary effort is normal. No respiratory distress.      Breath sounds: Normal breath sounds. No wheezing.   Abdominal:      General: Bowel sounds are normal.      Palpations: Abdomen is soft.      Tenderness: There is no abdominal tenderness.   Musculoskeletal:         General: Tenderness present. No deformity. Normal range of motion.      Cervical back: Normal range of motion and neck supple.   Skin:     General: Skin is warm and dry.      Coloration: Skin is not pale.      Findings: No erythema or rash.   Neurological:      Mental Status: She is alert and oriented to person, place, and time.      Cranial Nerves: No cranial nerve deficit.   Psychiatric:         Behavior: Behavior normal.         Thought Content: Thought content normal.         Procedures           ED Course                                           Medical Decision Making  Problems Addressed:  Finger injury, right, initial encounter: complicated acute illness or injury    Amount and/or Complexity of Data Reviewed  Radiology: ordered.        Final diagnoses:   Finger injury,  right, initial encounter       ED Disposition  ED Disposition       ED Disposition   Discharge    Condition   Stable    Comment   --               Chloe Barksdale MD  [address] [place] [address]  [phone]    Schedule an appointment as soon as possible for a visit in 2 days  If symptoms worsen         Medication List        Changed      cloNIDine 0.1 MG tablet  Commonly known as: CATAPRES  Take 1 tablet by mouth 2 (Two) Times a Day.  What changed:   when to take this  reasons to take this                 Carol Solano, APRN  10/17/23 3634

## 2023-12-25 ENCOUNTER — HOSPITAL ENCOUNTER (OUTPATIENT)
Facility: HOSPITAL | Age: 53
Setting detail: OBSERVATION
Discharge: HOME | End: 2023-12-26
Attending: EMERGENCY MEDICINE | Admitting: HOSPITALIST
Payer: COMMERCIAL

## 2023-12-25 ENCOUNTER — APPOINTMENT (OUTPATIENT)
Dept: CT IMAGING | Facility: HOSPITAL | Age: 53
End: 2023-12-25
Payer: COMMERCIAL

## 2023-12-25 DIAGNOSIS — E83.42 HYPOMAGNESEMIA: ICD-10-CM

## 2023-12-25 DIAGNOSIS — R11.2 NAUSEA AND VOMITING, UNSPECIFIED VOMITING TYPE: Primary | ICD-10-CM

## 2023-12-25 DIAGNOSIS — R20.2 PARESTHESIA: ICD-10-CM

## 2023-12-25 DIAGNOSIS — N17.9 ACUTE KIDNEY INJURY (NONTRAUMATIC): ICD-10-CM

## 2023-12-25 DIAGNOSIS — K29.80 DUODENITIS: ICD-10-CM

## 2023-12-25 DIAGNOSIS — R82.4 KETONURIA: ICD-10-CM

## 2023-12-25 PROBLEM — K50.90 INFLAMMATORY BOWEL DISEASE (CROHN'S DISEASE): Status: ACTIVE | Noted: 2023-12-25

## 2023-12-25 LAB
ALBUMIN SERPL-MCNC: 4.8 G/DL (ref 3.5–5.2)
ALBUMIN/GLOB SERPL: 1.3 G/DL
ALP SERPL-CCNC: 58 U/L (ref 39–117)
ALT SERPL W P-5'-P-CCNC: 22 U/L (ref 1–33)
ANION GAP SERPL CALCULATED.3IONS-SCNC: 26 MMOL/L (ref 5–15)
AST SERPL-CCNC: 28 U/L (ref 1–32)
B-HCG UR QL: NEGATIVE
BACTERIA UR QL AUTO: ABNORMAL /HPF
BASOPHILS # BLD AUTO: 0.02 10*3/MM3 (ref 0–0.2)
BASOPHILS NFR BLD AUTO: 0.2 % (ref 0–1.5)
BILIRUB SERPL-MCNC: 0.9 MG/DL (ref 0–1.2)
BILIRUB UR QL STRIP: NEGATIVE
BUN SERPL-MCNC: 23 MG/DL (ref 6–20)
BUN/CREAT SERPL: 17.8 (ref 7–25)
CALCIUM SPEC-SCNC: 10.4 MG/DL (ref 8.6–10.5)
CHLORIDE SERPL-SCNC: 87 MMOL/L (ref 98–107)
CLARITY UR: ABNORMAL
CO2 SERPL-SCNC: 20 MMOL/L (ref 22–29)
COLOR UR: YELLOW
CREAT SERPL-MCNC: 1.29 MG/DL (ref 0.57–1)
D-LACTATE SERPL-SCNC: 1.3 MMOL/L (ref 0.5–2)
D-LACTATE SERPL-SCNC: 3.5 MMOL/L (ref 0.5–2)
DEPRECATED RDW RBC AUTO: 43.5 FL (ref 37–54)
EGFRCR SERPLBLD CKD-EPI 2021: 49.4 ML/MIN/1.73
EOSINOPHIL # BLD AUTO: 0.02 10*3/MM3 (ref 0–0.4)
EOSINOPHIL NFR BLD AUTO: 0.2 % (ref 0.3–6.2)
ERYTHROCYTE [DISTWIDTH] IN BLOOD BY AUTOMATED COUNT: 11.9 % (ref 12.3–15.4)
EXPIRATION DATE: NORMAL
GLOBULIN UR ELPH-MCNC: 3.6 GM/DL
GLUCOSE SERPL-MCNC: 113 MG/DL (ref 65–99)
GLUCOSE UR STRIP-MCNC: NEGATIVE MG/DL
HCT VFR BLD AUTO: 38.9 % (ref 34–46.6)
HGB BLD-MCNC: 13.1 G/DL (ref 12–15.9)
HGB UR QL STRIP.AUTO: NEGATIVE
HOLD SPECIMEN: NORMAL
HYALINE CASTS UR QL AUTO: ABNORMAL /LPF
IMM GRANULOCYTES # BLD AUTO: 0.03 10*3/MM3 (ref 0–0.05)
IMM GRANULOCYTES NFR BLD AUTO: 0.4 % (ref 0–0.5)
INTERNAL NEGATIVE CONTROL: NORMAL
INTERNAL POSITIVE CONTROL: NORMAL
KETONES UR QL STRIP: ABNORMAL
LEUKOCYTE ESTERASE UR QL STRIP.AUTO: ABNORMAL
LIPASE SERPL-CCNC: 68 U/L (ref 13–60)
LYMPHOCYTES # BLD AUTO: 1.77 10*3/MM3 (ref 0.7–3.1)
LYMPHOCYTES NFR BLD AUTO: 22.1 % (ref 19.6–45.3)
Lab: NORMAL
MAGNESIUM SERPL-MCNC: 1.1 MG/DL (ref 1.6–2.6)
MCH RBC QN AUTO: 33.8 PG (ref 26.6–33)
MCHC RBC AUTO-ENTMCNC: 33.7 G/DL (ref 31.5–35.7)
MCV RBC AUTO: 100.3 FL (ref 79–97)
MONOCYTES # BLD AUTO: 0.62 10*3/MM3 (ref 0.1–0.9)
MONOCYTES NFR BLD AUTO: 7.7 % (ref 5–12)
NEUTROPHILS NFR BLD AUTO: 5.55 10*3/MM3 (ref 1.7–7)
NEUTROPHILS NFR BLD AUTO: 69.4 % (ref 42.7–76)
NITRITE UR QL STRIP: NEGATIVE
NRBC BLD AUTO-RTO: 0 /100 WBC (ref 0–0.2)
PH UR STRIP.AUTO: 7 [PH] (ref 5–8)
PLATELET # BLD AUTO: 320 10*3/MM3 (ref 140–450)
PMV BLD AUTO: 9.2 FL (ref 6–12)
POTASSIUM SERPL-SCNC: 3.8 MMOL/L (ref 3.5–5.2)
PROT SERPL-MCNC: 8.4 G/DL (ref 6–8.5)
PROT UR QL STRIP: ABNORMAL
RBC # BLD AUTO: 3.88 10*6/MM3 (ref 3.77–5.28)
RBC # UR STRIP: ABNORMAL /HPF
REF LAB TEST METHOD: ABNORMAL
SODIUM SERPL-SCNC: 133 MMOL/L (ref 136–145)
SP GR UR STRIP: 1.02 (ref 1–1.03)
SQUAMOUS #/AREA URNS HPF: ABNORMAL /HPF
UROBILINOGEN UR QL STRIP: ABNORMAL
WBC # UR STRIP: ABNORMAL /HPF
WBC NRBC COR # BLD AUTO: 8.01 10*3/MM3 (ref 3.4–10.8)
WHOLE BLOOD HOLD COAG: NORMAL
WHOLE BLOOD HOLD SPECIMEN: NORMAL

## 2023-12-25 PROCEDURE — 87086 URINE CULTURE/COLONY COUNT: CPT | Performed by: HOSPITALIST

## 2023-12-25 PROCEDURE — 96376 TX/PRO/DX INJ SAME DRUG ADON: CPT

## 2023-12-25 PROCEDURE — G0378 HOSPITAL OBSERVATION PER HR: HCPCS

## 2023-12-25 PROCEDURE — 81025 URINE PREGNANCY TEST: CPT | Performed by: EMERGENCY MEDICINE

## 2023-12-25 PROCEDURE — 25010000002 MAGNESIUM SULFATE IN D5W 1G/100ML (PREMIX) 1-5 GM/100ML-% SOLUTION: Performed by: EMERGENCY MEDICINE

## 2023-12-25 PROCEDURE — 96375 TX/PRO/DX INJ NEW DRUG ADDON: CPT

## 2023-12-25 PROCEDURE — 25010000002 MORPHINE PER 10 MG: Performed by: EMERGENCY MEDICINE

## 2023-12-25 PROCEDURE — 93005 ELECTROCARDIOGRAM TRACING: CPT | Performed by: PHYSICIAN ASSISTANT

## 2023-12-25 PROCEDURE — 83735 ASSAY OF MAGNESIUM: CPT | Performed by: EMERGENCY MEDICINE

## 2023-12-25 PROCEDURE — 83690 ASSAY OF LIPASE: CPT | Performed by: EMERGENCY MEDICINE

## 2023-12-25 PROCEDURE — 25810000003 SODIUM CHLORIDE 0.9 % SOLUTION: Performed by: EMERGENCY MEDICINE

## 2023-12-25 PROCEDURE — 74176 CT ABD & PELVIS W/O CONTRAST: CPT

## 2023-12-25 PROCEDURE — 80053 COMPREHEN METABOLIC PANEL: CPT | Performed by: EMERGENCY MEDICINE

## 2023-12-25 PROCEDURE — 96365 THER/PROPH/DIAG IV INF INIT: CPT

## 2023-12-25 PROCEDURE — 99284 EMERGENCY DEPT VISIT MOD MDM: CPT

## 2023-12-25 PROCEDURE — 25810000003 SODIUM CHLORIDE 0.9 % SOLUTION: Performed by: PHYSICIAN ASSISTANT

## 2023-12-25 PROCEDURE — 85025 COMPLETE CBC W/AUTO DIFF WBC: CPT | Performed by: EMERGENCY MEDICINE

## 2023-12-25 PROCEDURE — 25010000002 ONDANSETRON PER 1 MG: Performed by: PHYSICIAN ASSISTANT

## 2023-12-25 PROCEDURE — 81001 URINALYSIS AUTO W/SCOPE: CPT | Performed by: EMERGENCY MEDICINE

## 2023-12-25 PROCEDURE — 25010000002 ONDANSETRON PER 1 MG: Performed by: EMERGENCY MEDICINE

## 2023-12-25 PROCEDURE — 99222 1ST HOSP IP/OBS MODERATE 55: CPT | Performed by: PHYSICIAN ASSISTANT

## 2023-12-25 PROCEDURE — 36415 COLL VENOUS BLD VENIPUNCTURE: CPT

## 2023-12-25 PROCEDURE — 83605 ASSAY OF LACTIC ACID: CPT | Performed by: EMERGENCY MEDICINE

## 2023-12-25 RX ORDER — SODIUM CHLORIDE 9 MG/ML
40 INJECTION, SOLUTION INTRAVENOUS AS NEEDED
Status: DISCONTINUED | OUTPATIENT
Start: 2023-12-25 | End: 2023-12-26 | Stop reason: HOSPADM

## 2023-12-25 RX ORDER — SODIUM CHLORIDE 9 MG/ML
10 INJECTION INTRAVENOUS AS NEEDED
Status: DISCONTINUED | OUTPATIENT
Start: 2023-12-25 | End: 2023-12-26 | Stop reason: HOSPADM

## 2023-12-25 RX ORDER — SODIUM CHLORIDE 0.9 % (FLUSH) 0.9 %
10 SYRINGE (ML) INJECTION AS NEEDED
Status: DISCONTINUED | OUTPATIENT
Start: 2023-12-25 | End: 2023-12-26 | Stop reason: HOSPADM

## 2023-12-25 RX ORDER — SODIUM CHLORIDE 0.9 % (FLUSH) 0.9 %
10 SYRINGE (ML) INJECTION EVERY 12 HOURS SCHEDULED
Status: DISCONTINUED | OUTPATIENT
Start: 2023-12-26 | End: 2023-12-26 | Stop reason: HOSPADM

## 2023-12-25 RX ORDER — MAGNESIUM SULFATE 1 G/100ML
1 INJECTION INTRAVENOUS ONCE
Status: COMPLETED | OUTPATIENT
Start: 2023-12-25 | End: 2023-12-25

## 2023-12-25 RX ORDER — ONDANSETRON 4 MG/1
4 TABLET, ORALLY DISINTEGRATING ORAL EVERY 6 HOURS PRN
Status: DISCONTINUED | OUTPATIENT
Start: 2023-12-25 | End: 2023-12-26 | Stop reason: HOSPADM

## 2023-12-25 RX ORDER — SODIUM CHLORIDE 9 MG/ML
100 INJECTION, SOLUTION INTRAVENOUS CONTINUOUS
Status: DISCONTINUED | OUTPATIENT
Start: 2023-12-26 | End: 2023-12-26 | Stop reason: HOSPADM

## 2023-12-25 RX ORDER — ONDANSETRON 2 MG/ML
4 INJECTION INTRAMUSCULAR; INTRAVENOUS ONCE
Status: COMPLETED | OUTPATIENT
Start: 2023-12-25 | End: 2023-12-25

## 2023-12-25 RX ORDER — SODIUM CHLORIDE 9 MG/ML
125 INJECTION, SOLUTION INTRAVENOUS CONTINUOUS
Status: DISCONTINUED | OUTPATIENT
Start: 2023-12-25 | End: 2023-12-26 | Stop reason: HOSPADM

## 2023-12-25 RX ORDER — PANTOPRAZOLE SODIUM 40 MG/10ML
80 INJECTION, POWDER, LYOPHILIZED, FOR SOLUTION INTRAVENOUS ONCE
Status: COMPLETED | OUTPATIENT
Start: 2023-12-25 | End: 2023-12-25

## 2023-12-25 RX ORDER — CHOLECALCIFEROL (VITAMIN D3) 125 MCG
5 CAPSULE ORAL NIGHTLY PRN
Status: DISCONTINUED | OUTPATIENT
Start: 2023-12-25 | End: 2023-12-26 | Stop reason: HOSPADM

## 2023-12-25 RX ORDER — MORPHINE SULFATE 4 MG/ML
4 INJECTION, SOLUTION INTRAMUSCULAR; INTRAVENOUS ONCE
Status: COMPLETED | OUTPATIENT
Start: 2023-12-25 | End: 2023-12-25

## 2023-12-25 RX ORDER — ACETAMINOPHEN 325 MG/1
650 TABLET ORAL EVERY 4 HOURS PRN
Status: DISCONTINUED | OUTPATIENT
Start: 2023-12-25 | End: 2023-12-26 | Stop reason: HOSPADM

## 2023-12-25 RX ORDER — ONDANSETRON 2 MG/ML
4 INJECTION INTRAMUSCULAR; INTRAVENOUS EVERY 6 HOURS PRN
Status: DISCONTINUED | OUTPATIENT
Start: 2023-12-25 | End: 2023-12-26 | Stop reason: HOSPADM

## 2023-12-25 RX ORDER — PANTOPRAZOLE SODIUM 40 MG/1
40 TABLET, DELAYED RELEASE ORAL
Status: DISCONTINUED | OUTPATIENT
Start: 2023-12-26 | End: 2023-12-26

## 2023-12-25 RX ADMIN — SODIUM CHLORIDE 1000 ML: 9 INJECTION, SOLUTION INTRAVENOUS at 19:21

## 2023-12-25 RX ADMIN — PANTOPRAZOLE SODIUM 80 MG: 40 INJECTION, POWDER, FOR SOLUTION INTRAVENOUS at 23:29

## 2023-12-25 RX ADMIN — SODIUM CHLORIDE 100 ML/HR: 9 INJECTION, SOLUTION INTRAVENOUS at 23:29

## 2023-12-25 RX ADMIN — ACETAMINOPHEN 650 MG: 325 TABLET ORAL at 23:39

## 2023-12-25 RX ADMIN — ONDANSETRON 4 MG: 2 INJECTION INTRAMUSCULAR; INTRAVENOUS at 17:53

## 2023-12-25 RX ADMIN — ONDANSETRON 4 MG: 2 INJECTION INTRAMUSCULAR; INTRAVENOUS at 23:39

## 2023-12-25 RX ADMIN — MORPHINE SULFATE 4 MG: 4 INJECTION, SOLUTION INTRAMUSCULAR; INTRAVENOUS at 17:53

## 2023-12-25 RX ADMIN — MAGNESIUM SULFATE HEPTAHYDRATE 1 G: 1 INJECTION, SOLUTION INTRAVENOUS at 17:52

## 2023-12-25 RX ADMIN — BUSPIRONE HYDROCHLORIDE 15 MG: 10 TABLET ORAL at 23:29

## 2023-12-26 ENCOUNTER — ANESTHESIA (OUTPATIENT)
Dept: GASTROENTEROLOGY | Facility: HOSPITAL | Age: 53
End: 2023-12-26
Payer: COMMERCIAL

## 2023-12-26 ENCOUNTER — ANESTHESIA EVENT (OUTPATIENT)
Dept: GASTROENTEROLOGY | Facility: HOSPITAL | Age: 53
End: 2023-12-26
Payer: COMMERCIAL

## 2023-12-26 VITALS
HEART RATE: 76 BPM | BODY MASS INDEX: 23.73 KG/M2 | OXYGEN SATURATION: 99 % | DIASTOLIC BLOOD PRESSURE: 73 MMHG | HEIGHT: 65 IN | SYSTOLIC BLOOD PRESSURE: 130 MMHG | WEIGHT: 142.42 LBS | RESPIRATION RATE: 18 BRPM | TEMPERATURE: 97.7 F

## 2023-12-26 PROBLEM — R11.2 NAUSEA AND VOMITING: Status: ACTIVE | Noted: 2023-12-25

## 2023-12-26 PROBLEM — N17.9 AKI (ACUTE KIDNEY INJURY): Status: RESOLVED | Noted: 2023-12-25 | Resolved: 2023-12-26

## 2023-12-26 PROBLEM — R11.2 INTRACTABLE NAUSEA AND VOMITING: Status: RESOLVED | Noted: 2023-12-25 | Resolved: 2023-12-26

## 2023-12-26 PROBLEM — R11.2 NAUSEA AND VOMITING: Status: RESOLVED | Noted: 2023-12-25 | Resolved: 2023-12-26

## 2023-12-26 PROBLEM — K29.70 GASTRITIS: Status: ACTIVE | Noted: 2023-12-26

## 2023-12-26 LAB
ANION GAP SERPL CALCULATED.3IONS-SCNC: 11 MMOL/L (ref 5–15)
BASOPHILS # BLD AUTO: 0.03 10*3/MM3 (ref 0–0.2)
BASOPHILS NFR BLD AUTO: 0.4 % (ref 0–1.5)
BILIRUB UR QL STRIP: NEGATIVE
BUN SERPL-MCNC: 19 MG/DL (ref 6–20)
BUN/CREAT SERPL: 20.7 (ref 7–25)
CALCIUM SPEC-SCNC: 8.5 MG/DL (ref 8.6–10.5)
CHLORIDE SERPL-SCNC: 98 MMOL/L (ref 98–107)
CLARITY UR: CLEAR
CO2 SERPL-SCNC: 23 MMOL/L (ref 22–29)
COLOR UR: YELLOW
CREAT SERPL-MCNC: 0.92 MG/DL (ref 0.57–1)
DEPRECATED RDW RBC AUTO: 46 FL (ref 37–54)
EGFRCR SERPLBLD CKD-EPI 2021: 74.1 ML/MIN/1.73
EOSINOPHIL # BLD AUTO: 0.12 10*3/MM3 (ref 0–0.4)
EOSINOPHIL NFR BLD AUTO: 1.7 % (ref 0.3–6.2)
ERYTHROCYTE [DISTWIDTH] IN BLOOD BY AUTOMATED COUNT: 12.1 % (ref 12.3–15.4)
GLUCOSE SERPL-MCNC: 91 MG/DL (ref 65–99)
GLUCOSE UR STRIP-MCNC: NEGATIVE MG/DL
HCT VFR BLD AUTO: 32.9 % (ref 34–46.6)
HGB BLD-MCNC: 10.9 G/DL (ref 12–15.9)
HGB UR QL STRIP.AUTO: NEGATIVE
IMM GRANULOCYTES # BLD AUTO: 0.02 10*3/MM3 (ref 0–0.05)
IMM GRANULOCYTES NFR BLD AUTO: 0.3 % (ref 0–0.5)
KETONES UR QL STRIP: NEGATIVE
LEUKOCYTE ESTERASE UR QL STRIP.AUTO: NEGATIVE
LYMPHOCYTES # BLD AUTO: 3.02 10*3/MM3 (ref 0.7–3.1)
LYMPHOCYTES NFR BLD AUTO: 43.6 % (ref 19.6–45.3)
MAGNESIUM SERPL-MCNC: 1.5 MG/DL (ref 1.6–2.6)
MCH RBC QN AUTO: 34.5 PG (ref 26.6–33)
MCHC RBC AUTO-ENTMCNC: 33.1 G/DL (ref 31.5–35.7)
MCV RBC AUTO: 104.1 FL (ref 79–97)
MONOCYTES # BLD AUTO: 0.55 10*3/MM3 (ref 0.1–0.9)
MONOCYTES NFR BLD AUTO: 7.9 % (ref 5–12)
NEUTROPHILS NFR BLD AUTO: 3.19 10*3/MM3 (ref 1.7–7)
NEUTROPHILS NFR BLD AUTO: 46.1 % (ref 42.7–76)
NITRITE UR QL STRIP: NEGATIVE
NRBC BLD AUTO-RTO: 0 /100 WBC (ref 0–0.2)
PH UR STRIP.AUTO: <=5 [PH] (ref 5–8)
PLATELET # BLD AUTO: 216 10*3/MM3 (ref 140–450)
PMV BLD AUTO: 9.6 FL (ref 6–12)
POTASSIUM SERPL-SCNC: 3.8 MMOL/L (ref 3.5–5.2)
PROT UR QL STRIP: NEGATIVE
QT INTERVAL: 438 MS
QTC INTERVAL: 444 MS
RBC # BLD AUTO: 3.16 10*6/MM3 (ref 3.77–5.28)
SODIUM SERPL-SCNC: 132 MMOL/L (ref 136–145)
SP GR UR STRIP: 1.01 (ref 1–1.03)
UROBILINOGEN UR QL STRIP: NORMAL
WBC NRBC COR # BLD AUTO: 6.93 10*3/MM3 (ref 3.4–10.8)

## 2023-12-26 PROCEDURE — 96376 TX/PRO/DX INJ SAME DRUG ADON: CPT

## 2023-12-26 PROCEDURE — 88342 IMHCHEM/IMCYTCHM 1ST ANTB: CPT | Performed by: INTERNAL MEDICINE

## 2023-12-26 PROCEDURE — G0378 HOSPITAL OBSERVATION PER HR: HCPCS

## 2023-12-26 PROCEDURE — 25810000003 LACTATED RINGERS PER 1000 ML: Performed by: NURSE ANESTHETIST, CERTIFIED REGISTERED

## 2023-12-26 PROCEDURE — 99204 OFFICE O/P NEW MOD 45 MIN: CPT | Performed by: INTERNAL MEDICINE

## 2023-12-26 PROCEDURE — 25010000002 MAGNESIUM SULFATE 2 GM/50ML SOLUTION: Performed by: HOSPITALIST

## 2023-12-26 PROCEDURE — 88305 TISSUE EXAM BY PATHOLOGIST: CPT | Performed by: INTERNAL MEDICINE

## 2023-12-26 PROCEDURE — 25010000002 PROPOFOL 10 MG/ML EMULSION: Performed by: NURSE ANESTHETIST, CERTIFIED REGISTERED

## 2023-12-26 PROCEDURE — 43239 EGD BIOPSY SINGLE/MULTIPLE: CPT | Performed by: INTERNAL MEDICINE

## 2023-12-26 PROCEDURE — 99238 HOSP IP/OBS DSCHRG MGMT 30/<: CPT | Performed by: HOSPITALIST

## 2023-12-26 PROCEDURE — 25010000002 MORPHINE PER 10 MG: Performed by: STUDENT IN AN ORGANIZED HEALTH CARE EDUCATION/TRAINING PROGRAM

## 2023-12-26 PROCEDURE — 63710000001 ONDANSETRON ODT 4 MG TABLET DISPERSIBLE: Performed by: PHYSICIAN ASSISTANT

## 2023-12-26 PROCEDURE — 80048 BASIC METABOLIC PNL TOTAL CA: CPT | Performed by: PHYSICIAN ASSISTANT

## 2023-12-26 PROCEDURE — 85025 COMPLETE CBC W/AUTO DIFF WBC: CPT | Performed by: PHYSICIAN ASSISTANT

## 2023-12-26 PROCEDURE — 83735 ASSAY OF MAGNESIUM: CPT | Performed by: HOSPITALIST

## 2023-12-26 PROCEDURE — 81003 URINALYSIS AUTO W/O SCOPE: CPT | Performed by: HOSPITALIST

## 2023-12-26 PROCEDURE — 93010 ELECTROCARDIOGRAM REPORT: CPT | Performed by: INTERNAL MEDICINE

## 2023-12-26 RX ORDER — SODIUM CHLORIDE, SODIUM LACTATE, POTASSIUM CHLORIDE, CALCIUM CHLORIDE 600; 310; 30; 20 MG/100ML; MG/100ML; MG/100ML; MG/100ML
INJECTION, SOLUTION INTRAVENOUS CONTINUOUS PRN
Status: DISCONTINUED | OUTPATIENT
Start: 2023-12-26 | End: 2023-12-26 | Stop reason: SURG

## 2023-12-26 RX ORDER — PANTOPRAZOLE SODIUM 40 MG/1
40 TABLET, DELAYED RELEASE ORAL
Status: DISCONTINUED | OUTPATIENT
Start: 2023-12-26 | End: 2023-12-26 | Stop reason: HOSPADM

## 2023-12-26 RX ORDER — MORPHINE SULFATE 2 MG/ML
2 INJECTION, SOLUTION INTRAMUSCULAR; INTRAVENOUS EVERY 4 HOURS PRN
Status: DISCONTINUED | OUTPATIENT
Start: 2023-12-26 | End: 2023-12-26 | Stop reason: HOSPADM

## 2023-12-26 RX ORDER — PANTOPRAZOLE SODIUM 40 MG/1
40 TABLET, DELAYED RELEASE ORAL
Qty: 60 TABLET | Refills: 0 | Status: SHIPPED | OUTPATIENT
Start: 2023-12-26 | End: 2024-01-25

## 2023-12-26 RX ORDER — IPRATROPIUM BROMIDE AND ALBUTEROL SULFATE 2.5; .5 MG/3ML; MG/3ML
3 SOLUTION RESPIRATORY (INHALATION) ONCE AS NEEDED
Status: DISCONTINUED | OUTPATIENT
Start: 2023-12-26 | End: 2023-12-26 | Stop reason: HOSPADM

## 2023-12-26 RX ORDER — PROPOFOL 10 MG/ML
VIAL (ML) INTRAVENOUS AS NEEDED
Status: DISCONTINUED | OUTPATIENT
Start: 2023-12-26 | End: 2023-12-26 | Stop reason: SURG

## 2023-12-26 RX ORDER — ONDANSETRON 2 MG/ML
4 INJECTION INTRAMUSCULAR; INTRAVENOUS ONCE AS NEEDED
Status: DISCONTINUED | OUTPATIENT
Start: 2023-12-26 | End: 2023-12-26 | Stop reason: HOSPADM

## 2023-12-26 RX ORDER — NALOXONE HCL 0.4 MG/ML
0.4 VIAL (ML) INJECTION
Status: DISCONTINUED | OUTPATIENT
Start: 2023-12-26 | End: 2023-12-26 | Stop reason: HOSPADM

## 2023-12-26 RX ORDER — LIDOCAINE HYDROCHLORIDE 10 MG/ML
INJECTION, SOLUTION EPIDURAL; INFILTRATION; INTRACAUDAL; PERINEURAL AS NEEDED
Status: DISCONTINUED | OUTPATIENT
Start: 2023-12-26 | End: 2023-12-26 | Stop reason: SURG

## 2023-12-26 RX ORDER — SUCRALFATE 1 G/1
1 TABLET ORAL
Qty: 84 TABLET | Refills: 0 | Status: SHIPPED | OUTPATIENT
Start: 2023-12-26 | End: 2024-01-16

## 2023-12-26 RX ORDER — SUCRALFATE 1 G/1
1 TABLET ORAL
Status: DISCONTINUED | OUTPATIENT
Start: 2023-12-26 | End: 2023-12-26 | Stop reason: HOSPADM

## 2023-12-26 RX ORDER — ONDANSETRON 4 MG/1
4 TABLET, ORALLY DISINTEGRATING ORAL EVERY 6 HOURS PRN
Qty: 30 TABLET | Refills: 0 | Status: SHIPPED | OUTPATIENT
Start: 2023-12-26

## 2023-12-26 RX ORDER — MAGNESIUM SULFATE HEPTAHYDRATE 40 MG/ML
2 INJECTION, SOLUTION INTRAVENOUS
Status: DISCONTINUED | OUTPATIENT
Start: 2023-12-26 | End: 2023-12-26 | Stop reason: HOSPADM

## 2023-12-26 RX ADMIN — LIDOCAINE HYDROCHLORIDE 150 MG: 10 INJECTION, SOLUTION EPIDURAL; INFILTRATION; INTRACAUDAL; PERINEURAL at 11:11

## 2023-12-26 RX ADMIN — PANTOPRAZOLE SODIUM 40 MG: 40 TABLET, DELAYED RELEASE ORAL at 05:02

## 2023-12-26 RX ADMIN — SODIUM CHLORIDE, POTASSIUM CHLORIDE, SODIUM LACTATE AND CALCIUM CHLORIDE: 600; 310; 30; 20 INJECTION, SOLUTION INTRAVENOUS at 11:03

## 2023-12-26 RX ADMIN — PROPOFOL 20 MG: 10 INJECTION, EMULSION INTRAVENOUS at 11:14

## 2023-12-26 RX ADMIN — PROPOFOL 20 MG: 10 INJECTION, EMULSION INTRAVENOUS at 11:18

## 2023-12-26 RX ADMIN — PROPOFOL 100 MG: 10 INJECTION, EMULSION INTRAVENOUS at 11:11

## 2023-12-26 RX ADMIN — MORPHINE SULFATE 2 MG: 2 INJECTION, SOLUTION INTRAMUSCULAR; INTRAVENOUS at 02:39

## 2023-12-26 RX ADMIN — ONDANSETRON 4 MG: 4 TABLET, ORALLY DISINTEGRATING ORAL at 05:05

## 2023-12-26 RX ADMIN — SUCRALFATE 1 G: 1 TABLET ORAL at 12:10

## 2023-12-26 RX ADMIN — MAGNESIUM SULFATE HEPTAHYDRATE 2 G: 2 INJECTION, SOLUTION INTRAVENOUS at 12:10

## 2023-12-26 NOTE — ED PROVIDER NOTES
Subjective   History of Present Illness  53-year-old very pleasant female with history of Crohn's disease presents to the emergency department with concerns about vomiting for 48 hours which is intractable.  She is unable to tolerate her oral medications or any oral liquids for the past 48 hours.  She has had difficulty with vomiting over the past 2 months, and had an EGD earlier in December which showed what the patient describes as a diverticula of the fundus of the stomach.  She states that she is taking omeprazole, but has been unable to tolerate her oral medications for the past 48 hours.  She reports right upper quadrant abdominal pain for the past 2 days.  The pain radiates to the epigastric area.  She has not had a bowel movement for the past 3 days.  She is passing flatus.  She reports a history of a laparoscopic cholecystectomy and bilateral tubal ligation in the past.  On the way here, she developed tingling to the left perioral area which is intermittent, as well as tingling in the fingertips of bilateral hands.  She has the sensation of abdominal bloating.      Review of Systems   Constitutional:  Negative for diaphoresis and fever.   HENT:  Negative for facial swelling.    Eyes:  Negative for photophobia and discharge.   Respiratory:  Negative for stridor.    Gastrointestinal:  Positive for abdominal pain, nausea and vomiting. Negative for diarrhea.   Genitourinary:         Denies recent dysuria, however urine appears dark to the patient.   Neurological:  Negative for facial asymmetry and speech difficulty.       Past Medical History:   Diagnosis Date    Angina pectoris     Crohn disease     Heart disease     Hypertension        Allergies   Allergen Reactions    Metoprolol Other (See Comments)     Pt reports no longer taking; reports that it caused heart rate to increase into 170's; and elevated SBP >200    Pt reports no longer taking; reports that it caused heart rate to increase into 170's; and  elevated SBP >200       Past Surgical History:   Procedure Laterality Date    CHOLECYSTECTOMY      TUBAL ABDOMINAL LIGATION         Family History   Problem Relation Age of Onset    Hypertension Mother     No Known Problems Father     No Known Problems Sister     Cancer Maternal Uncle     Heart attack Maternal Uncle     Breast cancer Paternal Aunt         50's    Cancer Paternal Uncle     Cancer Maternal Grandmother     Breast cancer Maternal Grandmother         70's    Heart attack Cousin        Social History     Socioeconomic History    Marital status: Single   Tobacco Use    Smoking status: Former     Types: Electronic Cigarette     Passive exposure: Past    Smokeless tobacco: Never   Vaping Use    Vaping Use: Former   Substance and Sexual Activity    Alcohol use: Not Currently    Drug use: No    Sexual activity: Defer     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      Objective   Physical Exam  Vitals and nursing note reviewed.   Constitutional:       Appearance: She is ill-appearing. She is not diaphoretic.      Comments: Appears uncomfortable.  BMI 24.   HENT:      Mouth/Throat:      Comments: Tacky mucosa.  Eyes:      General: No scleral icterus.     Extraocular Movements: Extraocular movements intact.      Pupils: Pupils are equal, round, and reactive to light.      Comments: No photophobia or nystagmus.   Cardiovascular:      Heart sounds: No murmur heard.     No friction rub. No gallop.      Comments: S1, S2, mild regular tachycardia.  Pulmonary:      Effort: Pulmonary effort is normal. No respiratory distress.      Breath sounds: Normal breath sounds. No stridor. No wheezing, rhonchi or rales.   Abdominal:      Palpations: Abdomen is soft.      Tenderness: There is abdominal tenderness in the epigastric area. There is no guarding.      Comments: Mild epigastric tenderness.  I do not appreciate objective distention of the abdomen.   Skin:     General: Skin is warm and dry.       Coloration: Skin is not jaundiced.      Comments: Capillary refill is delayed, 3 seconds or more peripherally.   Neurological:      Mental Status: She is alert.      Comments: Normal speech, no dysarthria. No facial droop. Motor 5/5 power x 4 extremities, symmetric. Sensation grossly intact to light touch including to the face. Rapid alternating movements within normal limits. No dysmetria or past-pointing on finger-to-nose testing.           Procedures           ED Course  ED Course as of 12/25/23 2046   Mon Dec 25, 2023   2044 Results and plan discussed with the patient.  All questions addressed. [LD]      ED Course User Index  [LD] Shanel Naylor MD                                             Medical Decision Making  Differential diagnosis includes bowel obstruction, Crohn's flare, gastritis, duodenitis, peptic ulcer disease, esophagitis, foodborne illness, viral illness, dehydration, electrolyte abnormality, acute kidney injury, and others.    Problems Addressed:  Acute kidney injury (nontraumatic): complicated acute illness or injury  Hypomagnesemia: complicated acute illness or injury  Ketonuria: complicated acute illness or injury  Nausea and vomiting, unspecified vomiting type: complicated acute illness or injury    Amount and/or Complexity of Data Reviewed  External Data Reviewed: labs.     Details: Prior creatinine values were reviewed and were unremarkable.  Labs: ordered. Decision-making details documented in ED Course.     Details: Urinalysis consistent with contaminated clean-catch specimen.  Radiology: ordered. Decision-making details documented in ED Course.  Discussion of management or test interpretation with external provider(s): Case discussed with hospitalist Dr. Sheffield by [email or web address] chat at approximately 2050.    Risk  Prescription drug management.  Decision regarding hospitalization.      Recent Results (from the past 24 hour(s))   Comprehensive Metabolic Panel    Collection Time: 12/25/23   4:54 PM    Specimen: Blood   Result Value Ref Range    Glucose 113 (H) 65 - 99 mg/dL    BUN 23 (H) 6 - 20 mg/dL    Creatinine 1.29 (H) 0.57 - 1.00 mg/dL    Sodium 133 (L) 136 - 145 mmol/L    Potassium 3.8 3.5 - 5.2 mmol/L    Chloride 87 (L) 98 - 107 mmol/L    CO2 20.0 (L) 22.0 - 29.0 mmol/L    Calcium 10.4 8.6 - 10.5 mg/dL    Total Protein 8.4 6.0 - 8.5 g/dL    Albumin 4.8 3.5 - 5.2 g/dL    ALT (SGPT) 22 1 - 33 U/L    AST (SGOT) 28 1 - 32 U/L    Alkaline Phosphatase 58 39 - 117 U/L    Total Bilirubin 0.9 0.0 - 1.2 mg/dL    Globulin 3.6 gm/dL    A/G Ratio 1.3 g/dL    BUN/Creatinine Ratio 17.8 7.0 - 25.0    Anion Gap 26.0 (H) 5.0 - 15.0 mmol/L    eGFR 49.4 (L) >60.0 mL/min/1.73   Lipase    Collection Time: 12/25/23  4:54 PM    Specimen: Blood   Result Value Ref Range    Lipase 68 (H) 13 - 60 U/L   Lactic Acid, Plasma    Collection Time: 12/25/23  4:54 PM    Specimen: Blood   Result Value Ref Range    Lactate 3.5 (C) 0.5 - 2.0 mmol/L   Green Top (Gel)    Collection Time: 12/25/23  4:54 PM   Result Value Ref Range    Extra Tube Hold for add-ons.    Lavender Top    Collection Time: 12/25/23  4:54 PM   Result Value Ref Range    Extra Tube hold for add-on    Gold Top - SST    Collection Time: 12/25/23  4:54 PM   Result Value Ref Range    Extra Tube Hold for add-ons.    Light Blue Top    Collection Time: 12/25/23  4:54 PM   Result Value Ref Range    Extra Tube Hold for add-ons.    CBC Auto Differential    Collection Time: 12/25/23  4:54 PM    Specimen: Blood   Result Value Ref Range    WBC 8.01 3.40 - 10.80 10*3/mm3    RBC 3.88 3.77 - 5.28 10*6/mm3    Hemoglobin 13.1 12.0 - 15.9 g/dL    Hematocrit 38.9 34.0 - 46.6 %    .3 (H) 79.0 - 97.0 fL    MCH 33.8 (H) 26.6 - 33.0 pg    MCHC 33.7 31.5 - 35.7 g/dL    RDW 11.9 (L) 12.3 - 15.4 %    RDW-SD 43.5 37.0 - 54.0 fl    MPV 9.2 6.0 - 12.0 fL    Platelets 320 140 - 450 10*3/mm3    Neutrophil % 69.4 42.7 - 76.0 %    Lymphocyte % 22.1 19.6 - 45.3 %    Monocyte % 7.7 5.0 - 12.0  %    Eosinophil % 0.2 (L) 0.3 - 6.2 %    Basophil % 0.2 0.0 - 1.5 %    Immature Grans % 0.4 0.0 - 0.5 %    Neutrophils, Absolute 5.55 1.70 - 7.00 10*3/mm3    Lymphocytes, Absolute 1.77 0.70 - 3.10 10*3/mm3    Monocytes, Absolute 0.62 0.10 - 0.90 10*3/mm3    Eosinophils, Absolute 0.02 0.00 - 0.40 10*3/mm3    Basophils, Absolute 0.02 0.00 - 0.20 10*3/mm3    Immature Grans, Absolute 0.03 0.00 - 0.05 10*3/mm3    nRBC 0.0 0.0 - 0.2 /100 WBC   Magnesium    Collection Time: 12/25/23  4:54 PM    Specimen: Blood   Result Value Ref Range    Magnesium 1.1 (L) 1.6 - 2.6 mg/dL   Urinalysis With Microscopic If Indicated (No Culture) - Urine, Clean Catch    Collection Time: 12/25/23  4:55 PM    Specimen: Urine, Clean Catch   Result Value Ref Range    Color, UA Yellow Yellow, Straw    Appearance, UA Cloudy (A) Clear    pH, UA 7.0 5.0 - 8.0    Specific Gravity, UA 1.022 1.001 - 1.030    Glucose, UA Negative Negative    Ketones, UA 80 mg/dL (3+) (A) Negative    Bilirubin, UA Negative Negative    Blood, UA Negative Negative    Protein, UA Trace (A) Negative    Leuk Esterase, UA Small (1+) (A) Negative    Nitrite, UA Negative Negative    Urobilinogen, UA 1.0 E.U./dL 0.2 - 1.0 E.U./dL   Urinalysis, Microscopic Only - Urine, Clean Catch    Collection Time: 12/25/23  4:55 PM    Specimen: Urine, Clean Catch   Result Value Ref Range    RBC, UA 3-5 (A) None Seen, 0-2 /HPF    WBC, UA 11-20 (A) None Seen, 0-2 /HPF    Bacteria, UA 4+ (A) None Seen, Trace /HPF    Squamous Epithelial Cells, UA 31-50 (A) None Seen, 0-2 /HPF    Hyaline Casts, UA 0-6 0 - 6 /LPF    Methodology Automated Microscopy    POC Urine Pregnancy    Collection Time: 12/25/23  4:59 PM    Specimen: Urine   Result Value Ref Range    HCG, Urine, QL Negative Negative    Lot Number [id number]     Internal Positive Control Passed Positive, Passed    Internal Negative Control Passed Negative, Passed    Expiration Date 2025-02-04      Note: In addition to lab results from this visit, the  "labs listed above may include labs taken at another facility or during a different encounter within the last 24 hours. Please correlate lab times with ED admission and discharge times for further clarification of the services performed during this visit.    CT Abdomen Pelvis Without Contrast   Final Result   Impression:   1.There is minimal free fluid in the pelvis which is of questionable significance.   2.Nonobstructive intrarenal calculi   3.Appendix is slightly prominent in size without obvious inflammation around this area.   4.Minimal stranding in the fat around the third portion the duodenum which could relate to a duodenitis                  Electronically Signed: Veto Lewis MD     12/25/2023 6:11 PM EST     Workstation ID: [id number]        Vitals:    12/25/23 1637 12/25/23 1730 12/25/23 1830 12/25/23 1900   BP: 149/99 (!) 142/104 118/80 122/87   BP Location: Left arm      Patient Position: Sitting      Pulse: 100 89 84 71   Resp: 16      Temp: 98 °F (36.7 °C)      TempSrc: Oral      SpO2: 99% 97% 91% 95%   Weight: 65.8 kg (145 lb)      Height: 165.1 cm (65\")        Medications   Sodium Chloride (PF) 0.9 % 10 mL (has no administration in time range)   sodium chloride 0.9 % infusion (has no administration in time range)   pantoprazole (PROTONIX) injection 80 mg (has no administration in time range)   sodium chloride 0.9 % bolus 1,000 mL (1,000 mL Intravenous New Bag 12/25/23 1921)   sodium chloride 0.9 % bolus 1,000 mL (1,000 mL Intravenous New Bag 12/25/23 1921)   ondansetron (ZOFRAN) injection 4 mg (4 mg Intravenous Given 12/25/23 1753)   Morphine sulfate (PF) injection 4 mg (4 mg Intravenous Given 12/25/23 1753)   magnesium sulfate in D5W 1g/100mL (PREMIX) (1 g Intravenous New Bag 12/25/23 1752)     ECG/EMG Results (last 24 hours)       ** No results found for the last 24 hours. **          No orders to display           Final diagnoses:   Nausea and vomiting, unspecified vomiting type   Ketonuria "   Hypomagnesemia   Acute kidney injury (nontraumatic)   Duodenitis   Paresthesia       ED Disposition  ED Disposition       ED Disposition   Decision to Admit    Condition   --    Comment   --               No follow-up provider specified.       Medication List      No changes were made to your prescriptions during this visit.            Shanel Naylor MD  12/25/23 2048

## 2023-12-26 NOTE — ANESTHESIA POSTPROCEDURE EVALUATION
Patient: Isi Bolton    Procedure Summary       Date: 12/26/23 Room / Location:  [facility]    Anesthesia Start: 1103 Anesthesia Stop: 1131    Procedure: ESOPHAGOGASTRODUODENOSCOPY Diagnosis:       Nausea and vomiting, unspecified vomiting type      (Nausea and vomiting, unspecified vomiting type [R11.2])    Surgeons: Eh Pascal MD Provider: Billy Fernandez MD    Anesthesia Type: general ASA Status: 3            Anesthesia Type: general    Vitals  Vitals Value Taken Time   /66 12/26/23 1128   Temp     Pulse 85 12/26/23 1129   Resp     SpO2 98 % 12/26/23 1129   Vitals shown include unfiled device data.        Post Anesthesia Care and Evaluation    Patient location during evaluation: PACU  Patient participation: complete - patient participated  Level of consciousness: awake and alert  Pain management: adequate    Airway patency: patent  Anesthetic complications: No anesthetic complications  PONV Status: none  Cardiovascular status: hemodynamically stable and acceptable  Respiratory status: nonlabored ventilation, acceptable and nasal cannula  Hydration status: acceptable

## 2023-12-26 NOTE — ED NOTES
" Isi Bolton    Nursing Report ED to Floor:  Mental status: A&Ox4 GCS 15  Ambulatory status: Independent  Oxygen Therapy:  RA  Cardiac Rhythm: NS  Admitted from: ED  Safety Concerns:  None  Social Issues: None  ED Room #:  14    ED Nurse Phone Extension - [phone] or may call [phone].      HPI:   Chief Complaint   Patient presents with    Abdominal Pain    Vomiting    Nausea    Numbness       Past Medical History:  Past Medical History:   Diagnosis Date    Angina pectoris     Crohn disease     Heart disease     Hypertension         Past Surgical History:  Past Surgical History:   Procedure Laterality Date    CHOLECYSTECTOMY      TUBAL ABDOMINAL LIGATION          Admitting Doctor:   Eh Sheffield MD    Consulting Provider(s):  Consults       No orders found from 11/26/2023 to 12/26/2023.             Admitting Diagnosis:   The primary encounter diagnosis was Nausea and vomiting, unspecified vomiting type. Diagnoses of Ketonuria, Hypomagnesemia, Acute kidney injury (nontraumatic), Duodenitis, and Paresthesia were also pertinent to this visit.    Most Recent Vitals:   Vitals:    12/25/23 1637 12/25/23 1730 12/25/23 1830 12/25/23 1900   BP: 149/99 (!) 142/104 118/80 122/87   BP Location: Left arm      Patient Position: Sitting      Pulse: 100 89 84 71   Resp: 16      Temp: 98 °F (36.7 °C)      TempSrc: Oral      SpO2: 99% 97% 91% 95%   Weight: 65.8 kg (145 lb)      Height: 165.1 cm (65\")          Active LDAs/IV Access:   Lines, Drains & Airways       Active LDAs       Name Placement date Placement time Site Days    Peripheral IV 12/25/23 1758 Right Antecubital 12/25/23 1758  Antecubital  less than 1                    Labs (abnormal labs have a star):   Labs Reviewed   COMPREHENSIVE METABOLIC PANEL - Abnormal; Notable for the following components:       Result Value    Glucose 113 (*)     BUN 23 (*)     Creatinine 1.29 (*)     Sodium 133 (*)     Chloride 87 (*)     CO2 20.0 (*)     Anion Gap 26.0 (*)     eGFR 49.4 " (*)     All other components within normal limits    Narrative:     GFR Normal >60  Chronic Kidney Disease <60  Kidney Failure <15     LIPASE - Abnormal; Notable for the following components:    Lipase 68 (*)     All other components within normal limits   URINALYSIS W/ MICROSCOPIC IF INDICATED (NO CULTURE) - Abnormal; Notable for the following components:    Appearance, UA Cloudy (*)     Ketones, UA 80 mg/dL (3+) (*)     Protein, UA Trace (*)     Leuk Esterase, UA Small (1+) (*)     All other components within normal limits   LACTIC ACID, PLASMA - Abnormal; Notable for the following components:    Lactate 3.5 (*)     All other components within normal limits   CBC WITH AUTO DIFFERENTIAL - Abnormal; Notable for the following components:    .3 (*)     MCH 33.8 (*)     RDW 11.9 (*)     Eosinophil % 0.2 (*)     All other components within normal limits   URINALYSIS, MICROSCOPIC ONLY - Abnormal; Notable for the following components:    RBC, UA 3-5 (*)     WBC, UA 11-20 (*)     Bacteria, UA 4+ (*)     Squamous Epithelial Cells, UA 31-50 (*)     All other components within normal limits   MAGNESIUM - Abnormal; Notable for the following components:    Magnesium 1.1 (*)     All other components within normal limits   POCT PEFORM URINE PREGNANCY - Normal   RAINBOW DRAW    Narrative:     The following orders were created for panel order [place] Draw.  Procedure                               Abnormality         Status                     ---------                               -----------         ------                     Green Top (Gel)[[id number]]                                  Final result               Lavender Top[[id number]]                                     Final result               Gold Top - [facility] [[email or web address]]                                   Final result               Brown Top[[id number]]                                         Final result               Light Blue Top[[id number]]                                    Final result                 Please view results for these tests on the individual orders.   LACTIC ACID, REFLEX   CBC AND DIFFERENTIAL    Narrative:     The following orders were created for panel order CBC & Differential.  Procedure                               Abnormality         Status                     ---------                               -----------         ------                     CBC Auto Differential[[id number]]        Abnormal            Final result                 Please view results for these tests on the individual orders.   GREEN TOP   LAVENDER TOP   GOLD TOP - SST   GRAY TOP   LIGHT BLUE TOP       Meds Given in ED:   Medications   Sodium Chloride (PF) 0.9 % 10 mL (has no administration in time range)   sodium chloride 0.9 % infusion (has no administration in time range)   pantoprazole (PROTONIX) injection 80 mg (has no administration in time range)   sodium chloride 0.9 % bolus 1,000 mL (0 mL Intravenous Stopped 12/25/23 1951)   sodium chloride 0.9 % bolus 1,000 mL (0 mL Intravenous Stopped 12/25/23 1951)   ondansetron (ZOFRAN) injection 4 mg (4 mg Intravenous Given 12/25/23 1753)   Morphine sulfate (PF) injection 4 mg (4 mg Intravenous Given 12/25/23 1753)   magnesium sulfate in D5W 1g/100mL (PREMIX) (0 g Intravenous Stopped 12/25/23 1852)     sodium chloride, 125 mL/hr

## 2023-12-26 NOTE — H&P
[facility] Medicine Services  HISTORY AND PHYSICAL    Patient Name: Isi Bolton  : 1970  MRN: [id number]  Primary Care Physician: Chloe Barksdale MD  Date of admission: 2023    Subjective   Subjective     Chief Complaint:  Nausea/vomiting    HPI:  Isi Bolton is a 53 y.o. female with a history of Crohn's disease, HTN, GERD and anxiety/depression who presents to [facility] ED for complaint of persistent nausea/vomiting, and epigastric pain. She states that this has been going on intermittently for the last few weeks, but has worsened in the last few days. She was diagnosed with Crohn's disease recently and is being followed by Dr. Morales. Last episode of vomiting was earlier today. She denies fever, chills, chest pain, SOB, cough, diarrhea, or blood in stool. She is a non-smoker, denies alcohol abuse or illicit drug use.         Review of Systems   Constitutional:  Negative for chills, fatigue, fever and unexpected weight change.   HENT:  Negative for nosebleeds, sore throat and trouble swallowing.    Eyes:  Negative for photophobia and visual disturbance.   Respiratory:  Negative for cough, shortness of breath and wheezing.    Gastrointestinal:  Positive for abdominal pain (Epigastric pain), nausea and vomiting. Negative for diarrhea.   Genitourinary:  Negative for dysuria and hematuria.   Musculoskeletal:  Negative for arthralgias and myalgias.   Neurological:  Negative for tremors, syncope, speech difficulty and weakness.   Psychiatric/Behavioral:  Negative for confusion. The patient is not nervous/anxious.               Personal History     Past Medical History:   Diagnosis Date    Angina pectoris     Crohn disease     Heart disease     Hypertension              Past Surgical History:   Procedure Laterality Date    CHOLECYSTECTOMY      TUBAL ABDOMINAL LIGATION         Family History:  family history includes Breast cancer in her maternal grandmother and  paternal aunt; Cancer in her maternal grandmother, maternal uncle, and paternal uncle; Heart attack in her cousin and maternal uncle; Hypertension in her mother; No Known Problems in her father and sister.     Social History:  reports that she has quit smoking. Her smoking use included electronic cigarette. She has been exposed to tobacco smoke. She has never used smokeless tobacco. She reports that she does not currently use alcohol. She reports that she does not use drugs.  Social History     Social History Narrative    Not on file       Medications:  ALPRAZolam, Vortioxetine HBr, albuterol sulfate HFA, busPIRone, cetirizine, cloNIDine, colestipol, fluticasone, hydrOXYzine, hydrOXYzine pamoate, hydroCHLOROthiazide, lisinopril, montelukast, mupirocin, omeprazole, pantoprazole, propranolol, and sodium-potassium-magnesium sulfates    Allergies   Allergen Reactions    Metoprolol Other (See Comments)     Pt reports no longer taking; reports that it caused heart rate to increase into 170's; and elevated SBP >200    Pt reports no longer taking; reports that it caused heart rate to increase into 170's; and elevated SBP >200       Objective   Objective     Vital Signs:   Temp:  [98 °F (36.7 °C)] 98 °F (36.7 °C)  Heart Rate:  [] 71  Resp:  [16] 16  BP: (118-149)/() 122/87    Physical Exam  Constitutional:       General: She is not in acute distress.     Appearance: Normal appearance.   HENT:      Head: Atraumatic.      Right Ear: External ear normal.      Left Ear: External ear normal.      Nose: Nose normal.   Eyes:      Extraocular Movements: Extraocular movements intact.      Conjunctiva/sclera: Conjunctivae normal.      Pupils: Pupils are equal, round, and reactive to light.   Cardiovascular:      Rate and Rhythm: Normal rate and regular rhythm.      Pulses: Normal pulses.      Heart sounds: Normal heart sounds. No murmur heard.  Pulmonary:      Effort: Pulmonary effort is normal. No respiratory distress.       Breath sounds: Normal breath sounds. No wheezing, rhonchi or rales.   Abdominal:      General: Bowel sounds are normal. There is no distension.      Tenderness: There is abdominal tenderness (mild epigastric tenderness). There is no guarding or rebound.   Musculoskeletal:         General: Normal range of motion.      Cervical back: No rigidity.      Right lower leg: No edema.      Left lower leg: No edema.   Skin:     General: Skin is warm and dry.      Coloration: Skin is not jaundiced.      Findings: No lesion or rash.   Neurological:      General: No focal deficit present.      Mental Status: She is alert and oriented to person, place, and time.   Psychiatric:         Attention and Perception: Attention normal.         Mood and Affect: Mood normal.         Behavior: Behavior normal.         Thought Content: Thought content normal.          Result Review:  I have personally reviewed the results from the time of this admission to 12/25/2023 21:58 EST and agree with these findings:  [x]  Laboratory list / accordion  []  Microbiology  [x]  Radiology  []  EKG/Telemetry   []  Cardiology/Vascular   []  Pathology  [x]  Old records  []  Other:      LAB RESULTS:      Lab 12/25/23  1654   WBC 8.01   HEMOGLOBIN 13.1   HEMATOCRIT 38.9   PLATELETS 320   NEUTROS ABS 5.55   IMMATURE GRANS (ABS) 0.03   LYMPHS ABS 1.77   MONOS ABS 0.62   EOS ABS 0.02   .3*   LACTATE 3.5*         Lab 12/25/23  1654   SODIUM 133*   POTASSIUM 3.8   CHLORIDE 87*   CO2 20.0*   ANION GAP 26.0*   BUN 23*   CREATININE 1.29*   EGFR 49.4*   GLUCOSE 113*   CALCIUM 10.4   MAGNESIUM 1.1*         Lab 12/25/23  1654   TOTAL PROTEIN 8.4   ALBUMIN 4.8   GLOBULIN 3.6   ALT (SGPT) 22   AST (SGOT) 28   BILIRUBIN 0.9   ALK PHOS 58   LIPASE 68*                     Brief Urine Lab Results  (Last result in the past 365 days)        Color   Clarity   Blood   Leuk Est   Nitrite   Protein   CREAT   Urine HCG        12/25/23 1659               Negative              Microbiology Results (last 10 days)       ** No results found for the last 240 hours. **            CT Abdomen Pelvis Without Contrast    Result Date: 12/25/2023  CT ABDOMEN PELVIS WO CONTRAST Date of Exam: 12/25/2023 5:53 PM EST Indication: Nausea/vomiting intractable vomiting for 48 hours, hx of lap tad and BTL previously. Comparison: None available. Technique: Axial CT images were obtained of the abdomen and pelvis without the administration of contrast. Reconstructed coronal and sagittal images were also obtained. Automated exposure control and iterative construction methods were used. Findings: There is no definite abnormality of the liver. Patient's had a cholecystectomy. The spleen and pancreas do not appear unusual. There is no acute renal abnormality. There is a tiny nonobstructing calculus lower pole of both kidneys. The bladder is contracted. There is minimal free fluid in the pelvis which is of questionable significance. There is no bowel obstruction. Appendix is minimally prominent in transverse dimension measuring 7 mm without underlying inflammation around this area. There is limited stranding in the fat around the third portion the duodenum. This might relate to a duodenitis     Impression: Impression: 1.There is minimal free fluid in the pelvis which is of questionable significance. 2.Nonobstructive intrarenal calculi 3.Appendix is slightly prominent in size without obvious inflammation around this area. 4.Minimal stranding in the fat around the third portion the duodenum which could relate to a duodenitis Electronically Signed: Veto Lewis MD  12/25/2023 6:11 PM EST  Workstation ID: [id number]     Results for orders placed during the hospital encounter of 08/18/22    Adult Transthoracic Echo Complete W/ Cont if Necessary Per Protocol    Interpretation Summary  · Normal left ventricular cavity size and wall thickness noted.  · Left ventricular ejection fraction appears to be 61 - 65%.  ·  Estimated right ventricular systolic pressure from tricuspid regurgitation is normal (<35 mmHg).  · The aortic valve is structurally normal with no regurgitation or stenosis present.  · The mitral valve is structurally normal with no significant stenosis present. Mild mitral valve regurgitation is present.  · Mild tricuspid valve regurgitation is present. Estimated right ventricular systolic pressure from tricuspid regurgitation is normal (<35 mmHg).  · There is no evidence of pericardial effusion. .      Assessment & Plan   Assessment & Plan       Intractable nausea and vomiting    Essential hypertension    Inflammatory bowel disease (Crohn's disease)    KARINA (acute kidney injury)    Hypomagnesemia      Isi Bolton is a 53 y.o. female with a history of Crohn's disease, HTN, GERD and anxiety/depression who presents to [facility] ED for complaint of persistent nausea/vomiting, and epigastric pain.     Intractable Nausea/Vomiting  Crohn's Disease  Duodenitis?  -CT abd/pelvis tonight shows .Minimal stranding in the fat around the third portion the duodenum which could relate to a duodenitis.  -WBC normal.   -Lactate elevated at 3.5 on arrival.  Reflex pending.  -GI consult for the am  -NPO tonight.   -Zofran for nausea    HTN  -Takes  HCTZ and Lisinopril. Hold for now given KARINA.    KARINA  -Cr 1.29. Likely 2ry to dehydration from intractable vomiting  -eGFR 49.4  -Gentle IV fluids tonight.   -Holding home ACE-I and HCTZ for now  -Repeat bmp in the am    Hypomagnesemia  -Mag 1.1  -Replace per protocol        DVT prophylaxis:  SCDS    CODE STATUS:  Full Code       Expected Discharge 1-2 days     This note has been completed as part of a split-shared workflow.     Signature: Electronically signed by Sun Guerrero PA-C, 12/25/23, 11:03 PM EST

## 2023-12-26 NOTE — DISCHARGE SUMMARY
[facility] Medicine Services  DISCHARGE SUMMARY    Patient Name: Isi Bolton  : 1970  MRN: [id number]    Date of Admission: 2023  4:52 PM  Date of Discharge:  23  Primary Care Physician: Chloe Barksdale MD    Consults       Date and Time Order Name Status Description    2023 11:12 PM Inpatient Gastroenterology Consult Completed             Hospital Course     Presenting Problem: N/V    Active Hospital Problems    Diagnosis  POA    Gastritis [K29.70]  Yes    Hypomagnesemia [E83.42]  Yes    Inflammatory bowel disease (Crohn's disease) [K50.90]  Yes    Essential hypertension [I10]  Yes      Resolved Hospital Problems    Diagnosis Date Resolved POA    **Intractable nausea and vomiting [R11.2] 2023 Yes    KARINA (acute kidney injury) [N17.9] 2023 Unknown    Nausea and vomiting [R11.2] 2023 Unknown          Hospital Course:  Isi Bolton is a 53 y.o. female with a history of Crohn's disease, HTN, GERD and anxiety/depression who presents to [facility] ED for complaint of persistent nausea/vomiting, and epigastric pain. She has a known hx of gastric diverticulum followed by Dr. Haji, they are discussing surgical options and she has an appointment with him on 24.     Labs in the ER showed Cr 12.9, lactate 3.5, lipase 68. Normal WBC. UA with 4+ bacteria and numerous squamous cells, repeat UA was negative. CT A/P showed minimal stranding in the fat around the third portion of the duodenum which could relate to a duodenitis.     Patient admitted to hospitalist service. IV fluids, antiemetics, and pain meds ordered. Dr. Pascal with GI was consulted. EGD performed by him this morning: normal esophagus, known gastric diverticulum, gastritis (biopsied), erythematous duodenopathy in the bulb (biopsied), normal second and third portion of the duodenum. Recommends twice daily Protonix, 3 week course of Carafate 1g QID, PRN antiemetics. She may  have had a viral gastroenteritis, however will follow up on the EGD biopsies. If no improvement with above and time, could consider a course of Rifaximin for possible SIBO.     She is stable for discharge home today.    Discharge Follow Up Recommendations for outpatient labs/diagnostics:  F/U with PCP in 1 week  F/U with Dr. Haji as scheduled 1/5/24    Day of Discharge     HPI:   Patient seen this afternoon, walking around room. Feels better overall.       Vital Signs:   Temp:  [96.7 °F (35.9 °C)-98.1 °F (36.7 °C)] 97.7 °F (36.5 °C)  Heart Rate:  [] 65  Resp:  [16-18] 18  BP: ()/() 130/73      Physical Exam:  Gen-no acute distress  HENT-NCAT, mucous membranes moist  CV-RRR, S1 S2 normal, no m/r/g  Resp-CTAB, no wheezes or rales  Abd-soft, NT, ND, +BS  Ext-no edema  Neuro-A&Ox3, no focal deficits  Skin-no rashes  Psych-appropriate mood      Pertinent  and/or Most Recent Results     LAB RESULTS:      Lab 12/26/23  0609 12/25/23  2257 12/25/23  1654   WBC 6.93  --  8.01   HEMOGLOBIN 10.9*  --  13.1   HEMATOCRIT 32.9*  --  38.9   PLATELETS 216  --  320   NEUTROS ABS 3.19  --  5.55   IMMATURE GRANS (ABS) 0.02  --  0.03   LYMPHS ABS 3.02  --  1.77   MONOS ABS 0.55  --  0.62   EOS ABS 0.12  --  0.02   .1*  --  100.3*   LACTATE  --  1.3 3.5*         Lab 12/26/23  0609 12/25/23  1654   SODIUM 132* 133*   POTASSIUM 3.8 3.8   CHLORIDE 98 87*   CO2 23.0 20.0*   ANION GAP 11.0 26.0*   BUN 19 23*   CREATININE 0.92 1.29*   EGFR 74.1 49.4*   GLUCOSE 91 113*   CALCIUM 8.5* 10.4   MAGNESIUM 1.5* 1.1*         Lab 12/25/23  1654   TOTAL PROTEIN 8.4   ALBUMIN 4.8   GLOBULIN 3.6   ALT (SGPT) 22   AST (SGOT) 28   BILIRUBIN 0.9   ALK PHOS 58   LIPASE 68*                     Brief Urine Lab Results  (Last result in the past 365 days)        Color   Clarity   Blood   Leuk Est   Nitrite   Protein   CREAT   Urine HCG        12/25/23 1659               Negative             Microbiology Results (last 10 days)       **  No results found for the last 240 hours. **            CT Abdomen Pelvis Without Contrast    Result Date: 12/25/2023  CT ABDOMEN PELVIS WO CONTRAST Date of Exam: 12/25/2023 5:53 PM EST Indication: Nausea/vomiting intractable vomiting for 48 hours, hx of lap tad and BTL previously. Comparison: None available. Technique: Axial CT images were obtained of the abdomen and pelvis without the administration of contrast. Reconstructed coronal and sagittal images were also obtained. Automated exposure control and iterative construction methods were used. Findings: There is no definite abnormality of the liver. Patient's had a cholecystectomy. The spleen and pancreas do not appear unusual. There is no acute renal abnormality. There is a tiny nonobstructing calculus lower pole of both kidneys. The bladder is contracted. There is minimal free fluid in the pelvis which is of questionable significance. There is no bowel obstruction. Appendix is minimally prominent in transverse dimension measuring 7 mm without underlying inflammation around this area. There is limited stranding in the fat around the third portion the duodenum. This might relate to a duodenitis     Impression: 1.There is minimal free fluid in the pelvis which is of questionable significance. 2.Nonobstructive intrarenal calculi 3.Appendix is slightly prominent in size without obvious inflammation around this area. 4.Minimal stranding in the fat around the third portion the duodenum which could relate to a duodenitis Electronically Signed: Veto Lewis MD  12/25/2023 6:11 PM EST  Workstation ID: [id number]             Results for orders placed during the hospital encounter of 08/18/22    Adult Transthoracic Echo Complete W/ Cont if Necessary Per Protocol    Interpretation Summary  · Normal left ventricular cavity size and wall thickness noted.  · Left ventricular ejection fraction appears to be 61 - 65%.  · Estimated right ventricular systolic pressure from  tricuspid regurgitation is normal (<35 mmHg).  · The aortic valve is structurally normal with no regurgitation or stenosis present.  · The mitral valve is structurally normal with no significant stenosis present. Mild mitral valve regurgitation is present.  · Mild tricuspid valve regurgitation is present. Estimated right ventricular systolic pressure from tricuspid regurgitation is normal (<35 mmHg).  · There is no evidence of pericardial effusion. .      Pending Labs       Order Current Status    Tissue Pathology Exam In process    Urine Culture - Urine, Urine, Clean Catch In process          Discharge Details        Discharge Medications        New Medications        Instructions Start Date   ondansetron ODT 4 MG disintegrating tablet  Commonly known as: ZOFRAN-ODT   4 mg, Translingual, Every 6 Hours PRN      pantoprazole 40 MG EC tablet  Commonly known as: PROTONIX  Replaces: omeprazole 20 MG capsule   40 mg, Oral, 2 Times Daily Before Meals      sucralfate 1 g tablet  Commonly known as: CARAFATE   1 g, Oral, 4 Times Daily Before Meals & Nightly             Continue These Medications        Instructions Start Date   ALPRAZolam 1 MG tablet  Commonly known as: XANAX   Take 1 tablet (1 mg total) by mouth 2 (two) times a day if needed for anxiety.      busPIRone 15 MG tablet  Commonly known as: BUSPAR   Take 1 tablet (15 mg total) by mouth in the morning and 1 tablet (15 mg total) before bedtime.      cetirizine 10 MG tablet  Commonly known as: zyrTEC   10 mg, Oral, Daily      cetirizine 10 MG tablet  Commonly known as: zyrTEC   Take 1 tablet (10 mg total) by mouth 1 (one) time each day.      cloNIDine 0.1 MG tablet  Commonly known as: CATAPRES   0.1 mg, Oral, 2 Times Daily      colestipol 1 g tablet  Commonly known as: COLESTID   Take 1 tablet by mouth 2 times daily.      Flovent  MCG/ACT inhaler  Generic drug: fluticasone   Inhale 1 puff by mouth 2 (Two) Times a Day.      hydroCHLOROthiazide 25 MG  tablet  Commonly known as: HYDRODIURIL   25 mg, Oral, Daily      hydrOXYzine 10 MG tablet  Commonly known as: ATARAX   Take 1 tablet by mouth 3 Times a Day As Needed for anxiety/sleep      hydrOXYzine pamoate 25 MG capsule  Commonly known as: VISTARIL   Take 1 capsule (25 mg total) by mouth 3 (three) times a day if needed for itching.      lisinopril 20 MG tablet  Commonly known as: PRINIVIL,ZESTRIL   Take 1 tablet by mouth in the morning, and take 1 tablet in the evening.      montelukast 10 MG tablet  Commonly known as: SINGULAIR   Take 1 tablet (10 mg total) by mouth every night.      mupirocin 2 % ointment  Commonly known as: BACTROBAN   Apply topically 3 (three) times a day for 10 days.      propranolol 40 MG tablet  Commonly known as: INDERAL   Take 1 tablet by mouth twice a day for 90 days.      Trintellix 20 MG tablet  Generic drug: Vortioxetine HBr   Take 1 tablet (20 mg total) by mouth 1 time each day.      Ventolin  (90 Base) MCG/ACT inhaler  Generic drug: albuterol sulfate HFA   Inhale 2 puffs by mouth Every 4 (Four) Hours As Needed for Wheezing.             Stop These Medications      omeprazole 20 MG capsule  Commonly known as: priLOSEC  Replaced by: pantoprazole 40 MG EC tablet              Allergies   Allergen Reactions    Metoprolol Other (See Comments)     Pt reports no longer taking; reports that it caused heart rate to increase into 170's; and elevated SBP >200    Pt reports no longer taking; reports that it caused heart rate to increase into 170's; and elevated SBP >200         Discharge Disposition:  Home or Self Care    Diet:  Hospital:  Diet Order   Procedures    Diet: Gastrointestinal Diets; Fiber-Restricted; Texture: Regular Texture (IDDSI 7); Fluid Consistency: Thin (IDDSI 0)       Diet Instructions       Diet: Regular/House Diet; Regular Texture (IDDSI 7); Thin (IDDSI 0)      Discharge Diet: Regular/House Diet    Texture: Regular Texture (IDDSI 7)    Fluid Consistency: Thin (IDDSI  0)             Activity:  Activity Instructions       Activity as Tolerated                   CODE STATUS:    Code Status and Medical Interventions:   Ordered at: 12/25/23 4800     Code Status (Patient has no pulse and is not breathing):    CPR (Attempt to Resuscitate)     Medical Interventions (Patient has pulse or is breathing):    Full Support       No future appointments.    Additional Instructions for the Follow-ups that You Need to Schedule       Discharge Follow-up with PCP   As directed       Currently Documented PCP:    Chloe Barksdale MD    PCP Phone Number:    [phone]     Follow Up Details: 1 week                      Adina Tee MD  12/26/23      Time Spent on Discharge:  I spent  20  minutes on this discharge activity which included: face-to-face encounter with the patient, reviewing the data in the system, coordination of the care with the nursing staff as well as consultants, documentation, and entering orders.

## 2023-12-26 NOTE — CONSULTS
[facility] Consult    Referring Provider: No ref. provider found    PCP: Chloe Barksdale MD    Reason for Consultation: Abnormal CT imaging of GI tract, nausea vomiting    Chief complaint: Nausea and emesis    History of present illness:    Isi Bolton is a 53 y.o. female history of Crohn's disease who follows with Dr. Morales, hypertension, anxiety/depression who presented with nausea and emesis as well as central abdominal pain intermittently for the past 2 weeks however symptoms have worsened the last couple days.  No fever or chills.  Denies any diarrhea or bloody stools.  She reports bloating and belching more than usual.  She reports previously following with GI elsewhere and had breath test and was treated with antibiotics for period of time, but she is not sure if this was for H. pylori for small intestinal bacterial overgrowth.    There is record of colonoscopy in September with Agitar however cannot see report for review.  She reports colonoscopy was normal at that time.  She reports she has had previous colonoscopies with evidence of inflammation suspicion for Crohn's and has been treated for this with budesonide and thinks she had a prednisone course maybe earlier this year.  She reports she is only on colestipol now.  She also had a upper GI series in September that showed normal-appearing esophageal mucosa, mild esophageal dysmotility, mild reflux, large diverticulum on the fundus of the stomach measuring 5 x 3.5 cm, normal-appearing gastric folds of mucosa, no evidence of gastric or duodenal ulcer, no delayed gastric emptying, duodenal bulb and duodenal C-loop appeared normal.    She had EGD with Dr. Haji earlier this month and evaluation for this gastric diverticulum.  She has follow-up with him in the next couple of weeks to further discuss potential surgical options for resection of this large gastric diverticulum.    Noncon CT abdomen pelvis on admission here with noted  status postcholecystectomy, normal-appearing spleen and pancreas, minimal free fluid in the pelvis, no bowel obstruction, minimally prominent appendix without underlying inflammation around the area, limited stranding in the fat around the third portion of the duodenum.  Lipase was normal.  LFTs and T. bili normal.  She did have mild KARINA on admission that has improved.  Lactate was elevated 3.5 is also improved to 1.3.  White count has been normal.      Allergies:  Metoprolol    Scheduled Meds:  busPIRone, 15 mg, Oral, Nightly  pantoprazole, 40 mg, Oral, Q AM  sodium chloride, 10 mL, Intravenous, Q12H         Infusions:  sodium chloride, 125 mL/hr  sodium chloride, 100 mL/hr, Last Rate: 100 mL/hr (12/25/23 2149)        PRN Meds:    acetaminophen    melatonin    Morphine **AND** naloxone    ondansetron ODT **OR** ondansetron    Sodium Chloride (PF)    sodium chloride    sodium chloride    Home Meds:  Medications Prior to Admission   Medication Sig Dispense Refill Last Dose    albuterol sulfate  (90 Base) MCG/ACT inhaler Inhale 2 puffs by mouth Every 4 (Four) Hours As Needed for Wheezing. 18 g 11     ALPRAZolam (XANAX) 1 MG tablet Take 1 tablet (1 mg total) by mouth 2 (two) times a day if needed for anxiety. 60 tablet 2     busPIRone (BUSPAR) 15 MG tablet Take 1 tablet (15 mg total) by mouth in the morning and 1 tablet (15 mg total) before bedtime. 60 tablet 5     cetirizine (zyrTEC) 10 MG tablet Take 1 tablet by mouth Daily. 90 tablet 0     cetirizine (zyrTEC) 10 MG tablet Take 1 tablet (10 mg total) by mouth 1 (one) time each day. 90 tablet 1     cloNIDine (CATAPRES) 0.1 MG tablet Take 1 tablet by mouth 2 (Two) Times a Day. (Patient taking differently: Take 1 tablet by mouth 2 (Two) Times a Day As Needed.) 180 tablet 1     colestipol (COLESTID) 1 g tablet Take 1 tablet by mouth 2 times daily. 60 tablet 11     fluticasone (FLOVENT HFA) 110 MCG/ACT inhaler Inhale 1 puff by mouth 2 (Two) Times a Day. 12 g 2      hydroCHLOROthiazide (HYDRODIURIL) 25 MG tablet Take 1 tablet by mouth Daily. 90 tablet 3     hydrOXYzine (ATARAX) 10 MG tablet Take 1 tablet by mouth 3 Times a Day As Needed for anxiety/sleep (Patient taking differently: Take 1 tablet by mouth 3 (Three) Times a Day As Needed for Anxiety.) 90 tablet 1     hydrOXYzine pamoate (VISTARIL) 25 MG capsule Take 1 capsule (25 mg total) by mouth 3 (three) times a day if needed for itching. 90 capsule 5     lisinopril (PRINIVIL,ZESTRIL) 20 MG tablet Take 1 tablet by mouth in the morning, and take 1 tablet in the evening. 180 tablet 3     montelukast (SINGULAIR) 10 MG tablet Take 1 tablet (10 mg total) by mouth every night. 90 tablet 1     mupirocin (BACTROBAN) 2 % ointment Apply topically 3 (three) times a day for 10 days. 22 g 0     omeprazole (priLOSEC) 20 MG capsule Take 1 capsule by mouth 2 times daily. 60 capsule 5     propranolol (INDERAL) 40 MG tablet Take 1 tablet by mouth twice a day for 90 days. 180 tablet 1     Vortioxetine HBr (Trintellix) 20 MG tablet Take 1 tablet (20 mg total) by mouth 1 time each day. 30 tablet 5        ROS: Review of Systems   Constitutional:  Negative for chills and fever.   Respiratory:  Negative for cough and shortness of breath.    Gastrointestinal:  Negative for abdominal distention, abdominal pain, nausea and vomiting.   Skin:  Negative for color change and rash.   All other systems reviewed and are negative.      PAST MED HX:  Past Medical History:   Diagnosis Date    Angina pectoris     Crohn disease     Heart disease     Hypertension        PAST SURG HX:  Past Surgical History:   Procedure Laterality Date    CHOLECYSTECTOMY      TUBAL ABDOMINAL LIGATION         FAM HX:  Family History   Problem Relation Age of Onset    Hypertension Mother     No Known Problems Father     No Known Problems Sister     Cancer Maternal Uncle     Heart attack Maternal Uncle     Breast cancer Paternal Aunt         50's    Cancer Paternal Uncle     Cancer  "Maternal Grandmother     Breast cancer Maternal Grandmother         70's    Heart attack Cousin        SOC HX:  Social History     Socioeconomic History    Marital status: Single   Tobacco Use    Smoking status: Former     Types: Electronic Cigarette     Passive exposure: Past    Smokeless tobacco: Never   Vaping Use    Vaping Use: Former   Substance and Sexual Activity    Alcohol use: Not Currently    Drug use: No    Sexual activity: Defer       PHYSICAL EXAM  /72 (BP Location: Left arm, Patient Position: Lying)   Pulse 73   Temp 97.7 °F (36.5 °C) (Oral)   Resp 18   Ht 165.1 cm (65\")   Wt 64.6 kg (142 lb 6.7 oz)   SpO2 96%   BMI 23.70 kg/m²   Wt Readings from Last 3 Encounters:   12/25/23 64.6 kg (142 lb 6.7 oz)   10/17/23 63.5 kg (140 lb)   07/14/23 62.6 kg (138 lb)   ,body mass index is 23.7 kg/m².  Physical Exam   General: Patient awake, alert and cooperative   Eyes: Normal lids and lashes, no scleral icterus   Neck: Supple, normal ROM   Skin: Warm and dry, not jaundiced   Cardiovascular: Regular rate, well-perfused extremities   Pulm: Equal expansion bilaterally, no increased WOB   Abdomen: Soft, nontender, nondistended;    Extremities: No rash or edema              Neuro: A&O, No obvious sign of focal deficit   Psychiatric: Normal mood and behavior; memory intact    Results Review:   I reviewed the patient's new clinical results.    Lab Results   Component Value Date    WBC 8.01 12/25/2023    HGB 13.1 12/25/2023    HGB 12.6 08/21/2023    HGB 12.1 06/13/2023    HCT 38.9 12/25/2023    .3 (H) 12/25/2023     12/25/2023       No results found for: \"INR\"    Lab Results   Component Value Date    GLUCOSE 91 12/26/2023    BUN 19 12/26/2023    CREATININE 0.92 12/26/2023    EGFRIFNONA 84 04/06/2017    BCR 20.7 12/26/2023     (L) 12/26/2023    K 3.8 12/26/2023    CO2 23.0 12/26/2023    CALCIUM 8.5 (L) 12/26/2023    ALBUMIN 4.8 12/25/2023    ALKPHOS 58 12/25/2023    BILITOT 0.9 12/25/2023 "    ALT 22 12/25/2023    AST 28 12/25/2023       ASSESSMENTS/PLANS  Impression:  Abdominal pain  Nausea and emesis  Abnormal CT imaging of the duodenum  History of large gastric diverticulum  Reported history of ?Crohn's    Plan:  - She had EGD with Dr. Haji earlier this month and evaluation for this gastric diverticulum.  She has follow-up with him in the next couple of weeks to further discuss potential surgical options for resection of this large gastric diverticulum.  -She reports no evidence of Crohn's on colonoscopy in September, reports she is on colestipol now and this has managed her symptoms, she does report previous treatment with budesonide as well as prednisone but has not required any of this recently  -Antiemetics as needed  -PPI  -N.p.o., plan for repeat upper endoscopy to further evaluate CT image findings and worsening symptoms  -Will need follow-up with her primary gastroenterologist Dr. Morales after discharge    I discussed the patient's findings and my recommendations with patient    Eh Pascal MD  12/26/23  08:16 EST

## 2023-12-26 NOTE — PLAN OF CARE
Goal Outcome Evaluation:         VSS, RA  -DC home with                
Goal Outcome Evaluation:  Plan of Care Reviewed With: patient        Progress: no change  Outcome Evaluation: VSS. RA. NSR on the monitor. A&O x4. PRNs given for pain control and nausea. No episodes of vomiting. IVFs infusing. Pt NPO for GI consult in the AM. Plan of care discussed with patient who expresses understanding. No further complaints at this time.         
none

## 2023-12-26 NOTE — ANESTHESIA PREPROCEDURE EVALUATION
Anesthesia Evaluation                  Airway   Mallampati: I  TM distance: >3 FB  Neck ROM: full  No difficulty expected  Dental      Pulmonary    Cardiovascular     ECG reviewed    (+) hypertension    ROS comment: Indeterminate stress,  0 calcium score    Neuro/Psych  GI/Hepatic/Renal/Endo    (+) renal disease-    Musculoskeletal     Abdominal    Substance History      OB/GYN          Other                    Anesthesia Plan    ASA 3     general     intravenous induction     Anesthetic plan, risks, benefits, and alternatives have been provided, discussed and informed consent has been obtained with: patient.    Plan discussed with CRNA.    CODE STATUS:    Code Status (Patient has no pulse and is not breathing): CPR (Attempt to Resuscitate)  Medical Interventions (Patient has pulse or is breathing): Full Support

## 2023-12-27 LAB — BACTERIA SPEC AEROBE CULT: ABNORMAL

## 2023-12-28 LAB
CYTO UR: NORMAL
LAB AP CASE REPORT: NORMAL
LAB AP CLINICAL INFORMATION: NORMAL
PATH REPORT.FINAL DX SPEC: NORMAL
PATH REPORT.GROSS SPEC: NORMAL

## 2024-01-04 NOTE — PROGRESS NOTES
Please send patient letter with pathology results from her recent EGD with biopsies as follows:    Biopsies from the duodenum were unremarkable.  Biopsies from the stomach show some chronic inflammation with no evidence of H. pylori.    As we discussed following your EGD,  Recommend twice daily PPI.  Complete 3 week course of carafate 1g QID.  May benefit from ginger root supplement..  Anti-emetics as needed.  If no improvement with above over time, then could consider course on rifaximin for possible SIBO.  Recommend close follow-up with your primary gastroenterologist for further discussion.

## 2024-01-17 ENCOUNTER — PRE-ADMISSION TESTING (OUTPATIENT)
Dept: PREADMISSION TESTING | Facility: HOSPITAL | Age: 54
End: 2024-01-17
Payer: COMMERCIAL

## 2024-01-17 VITALS — HEIGHT: 65 IN | WEIGHT: 146.16 LBS | BODY MASS INDEX: 24.35 KG/M2

## 2024-01-17 LAB
ANION GAP SERPL CALCULATED.3IONS-SCNC: 15 MMOL/L (ref 5–15)
BUN SERPL-MCNC: 21 MG/DL (ref 6–20)
BUN/CREAT SERPL: 21 (ref 7–25)
CALCIUM SPEC-SCNC: 9.8 MG/DL (ref 8.6–10.5)
CHLORIDE SERPL-SCNC: 89 MMOL/L (ref 98–107)
CO2 SERPL-SCNC: 25 MMOL/L (ref 22–29)
CREAT SERPL-MCNC: 1 MG/DL (ref 0.57–1)
DEPRECATED RDW RBC AUTO: 42.4 FL (ref 37–54)
EGFRCR SERPLBLD CKD-EPI 2021: 67.1 ML/MIN/1.73
ERYTHROCYTE [DISTWIDTH] IN BLOOD BY AUTOMATED COUNT: 11.6 % (ref 12.3–15.4)
GLUCOSE SERPL-MCNC: 123 MG/DL (ref 65–99)
HBA1C MFR BLD: 5.2 % (ref 4.8–5.6)
HCT VFR BLD AUTO: 32.7 % (ref 34–46.6)
HGB BLD-MCNC: 10.9 G/DL (ref 12–15.9)
MCH RBC QN AUTO: 32.9 PG (ref 26.6–33)
MCHC RBC AUTO-ENTMCNC: 33.3 G/DL (ref 31.5–35.7)
MCV RBC AUTO: 98.8 FL (ref 79–97)
PLATELET # BLD AUTO: 267 10*3/MM3 (ref 140–450)
PMV BLD AUTO: 8.7 FL (ref 6–12)
POTASSIUM SERPL-SCNC: 4.3 MMOL/L (ref 3.5–5.2)
RBC # BLD AUTO: 3.31 10*6/MM3 (ref 3.77–5.28)
SODIUM SERPL-SCNC: 129 MMOL/L (ref 136–145)
WBC NRBC COR # BLD AUTO: 7.2 10*3/MM3 (ref 3.4–10.8)

## 2024-01-17 PROCEDURE — 36415 COLL VENOUS BLD VENIPUNCTURE: CPT

## 2024-01-17 PROCEDURE — 83036 HEMOGLOBIN GLYCOSYLATED A1C: CPT

## 2024-01-17 PROCEDURE — 85027 COMPLETE CBC AUTOMATED: CPT

## 2024-01-17 PROCEDURE — 80048 BASIC METABOLIC PNL TOTAL CA: CPT

## 2024-01-17 NOTE — PAT
Patient to apply Chlorhexadine wipes  to surgical area (as instructed) the night before procedure and the AM of procedure. Wipes provided.   Per Anesthesia Request, patient instructed not to take their ACE/ARB medications on the AM of surgery.

## 2024-01-18 ENCOUNTER — ANESTHESIA (OUTPATIENT)
Dept: PERIOP | Facility: HOSPITAL | Age: 54
End: 2024-01-18
Payer: COMMERCIAL

## 2024-01-18 ENCOUNTER — ANESTHESIA EVENT (OUTPATIENT)
Dept: PERIOP | Facility: HOSPITAL | Age: 54
End: 2024-01-18
Payer: COMMERCIAL

## 2024-01-18 ENCOUNTER — HOSPITAL ENCOUNTER (OUTPATIENT)
Facility: HOSPITAL | Age: 54
Discharge: HOME | End: 2024-01-19
Attending: SURGERY | Admitting: SURGERY
Payer: COMMERCIAL

## 2024-01-18 ENCOUNTER — ANESTHESIA EVENT CONVERTED (OUTPATIENT)
Dept: ANESTHESIOLOGY | Facility: HOSPITAL | Age: 54
End: 2024-01-18
Payer: COMMERCIAL

## 2024-01-18 DIAGNOSIS — K31.4 GASTRIC DIVERTICULUM: Primary | ICD-10-CM

## 2024-01-18 DIAGNOSIS — K29.70 GASTRITIS: ICD-10-CM

## 2024-01-18 PROBLEM — F41.9 ANXIETY: Status: ACTIVE | Noted: 2018-04-28

## 2024-01-18 LAB
ANION GAP SERPL CALCULATED.3IONS-SCNC: 17 MMOL/L (ref 5–15)
B-HCG UR QL: NEGATIVE
BUN SERPL-MCNC: 14 MG/DL (ref 6–20)
BUN/CREAT SERPL: 16.1 (ref 7–25)
CALCIUM SPEC-SCNC: 9.7 MG/DL (ref 8.6–10.5)
CHLORIDE SERPL-SCNC: 94 MMOL/L (ref 98–107)
CO2 SERPL-SCNC: 24 MMOL/L (ref 22–29)
CREAT SERPL-MCNC: 0.87 MG/DL (ref 0.57–1)
EGFRCR SERPLBLD CKD-EPI 2021: 79.3 ML/MIN/1.73
EXPIRATION DATE: NORMAL
GLUCOSE SERPL-MCNC: 84 MG/DL (ref 65–99)
INTERNAL NEGATIVE CONTROL: NORMAL
INTERNAL POSITIVE CONTROL: NORMAL
Lab: NORMAL
POTASSIUM SERPL-SCNC: 3.8 MMOL/L (ref 3.5–5.2)
SODIUM SERPL-SCNC: 135 MMOL/L (ref 136–145)

## 2024-01-18 PROCEDURE — 25010000002 ONDANSETRON PER 1 MG: Performed by: SURGERY

## 2024-01-18 PROCEDURE — 25010000002 HYDROMORPHONE HCL PF 50 MG/5ML SOLUTION

## 2024-01-18 PROCEDURE — 80048 BASIC METABOLIC PNL TOTAL CA: CPT | Performed by: ANESTHESIOLOGY

## 2024-01-18 PROCEDURE — 25010000002 CEFAZOLIN PER 500 MG: Performed by: SURGERY

## 2024-01-18 PROCEDURE — 25010000002 FENTANYL CITRATE (PF) 100 MCG/2ML SOLUTION: Performed by: NURSE ANESTHETIST, CERTIFIED REGISTERED

## 2024-01-18 PROCEDURE — 25010000002 DEXAMETHASONE PER 1 MG: Performed by: NURSE ANESTHETIST, CERTIFIED REGISTERED

## 2024-01-18 PROCEDURE — 25010000002 ONDANSETRON PER 1 MG: Performed by: NURSE ANESTHETIST, CERTIFIED REGISTERED

## 2024-01-18 PROCEDURE — 25010000002 BUPIVACAINE (PF) 0.25 % SOLUTION: Performed by: NURSE ANESTHETIST, CERTIFIED REGISTERED

## 2024-01-18 PROCEDURE — 25810000003 LACTATED RINGERS PER 1000 ML: Performed by: ANESTHESIOLOGY

## 2024-01-18 PROCEDURE — 94799 UNLISTED PULMONARY SVC/PX: CPT

## 2024-01-18 PROCEDURE — 25010000002 SUGAMMADEX 200 MG/2ML SOLUTION: Performed by: NURSE ANESTHETIST, CERTIFIED REGISTERED

## 2024-01-18 PROCEDURE — 25010000002 FENTANYL CITRATE (PF) 50 MCG/ML SOLUTION

## 2024-01-18 PROCEDURE — 25810000003 SODIUM CHLORIDE 0.9 % SOLUTION

## 2024-01-18 PROCEDURE — 25010000002 KETOROLAC TROMETHAMINE PER 15 MG

## 2024-01-18 PROCEDURE — 25010000002 DEXAMETHASONE SODIUM PHOSPHATE 10 MG/ML SOLUTION: Performed by: NURSE ANESTHETIST, CERTIFIED REGISTERED

## 2024-01-18 PROCEDURE — 25010000002 PROPOFOL 10 MG/ML EMULSION: Performed by: NURSE ANESTHETIST, CERTIFIED REGISTERED

## 2024-01-18 PROCEDURE — 25010000002 HYDROMORPHONE 1 MG/ML SOLUTION

## 2024-01-18 PROCEDURE — 88305 TISSUE EXAM BY PATHOLOGIST: CPT | Performed by: SURGERY

## 2024-01-18 PROCEDURE — 25010000002 MIDAZOLAM PER 1 MG: Performed by: ANESTHESIOLOGY

## 2024-01-18 PROCEDURE — 25010000002 MIDAZOLAM PER 1 MG

## 2024-01-18 PROCEDURE — G0378 HOSPITAL OBSERVATION PER HR: HCPCS

## 2024-01-18 PROCEDURE — 81025 URINE PREGNANCY TEST: CPT | Performed by: ANESTHESIOLOGY

## 2024-01-18 DEVICE — ECHELON 3000 60MM STANDARD
Type: IMPLANTABLE DEVICE | Site: ABDOMEN | Status: FUNCTIONAL
Brand: ECHELON

## 2024-01-18 DEVICE — LIGACLIP 10-M/L, 10MM ENDOSCOPIC ROTATING MULTIPLE CLIP APPLIERS
Type: IMPLANTABLE DEVICE | Site: ABDOMEN | Status: FUNCTIONAL
Brand: LIGACLIP

## 2024-01-18 DEVICE — ENDOPATH ECHELON ENDOSCOPIC LINEAR CUTTER RELOADS, BLUE, 60MM
Type: IMPLANTABLE DEVICE | Site: ABDOMEN | Status: FUNCTIONAL
Brand: ECHELON ENDOPATH

## 2024-01-18 RX ORDER — CLONIDINE HYDROCHLORIDE 0.1 MG/1
0.1 TABLET ORAL 2 TIMES DAILY
Status: DISCONTINUED | OUTPATIENT
Start: 2024-01-18 | End: 2024-01-19 | Stop reason: HOSPADM

## 2024-01-18 RX ORDER — SODIUM CHLORIDE 0.9 % (FLUSH) 0.9 %
3-10 SYRINGE (ML) INJECTION AS NEEDED
Status: DISCONTINUED | OUTPATIENT
Start: 2024-01-18 | End: 2024-01-18 | Stop reason: HOSPADM

## 2024-01-18 RX ORDER — DOCUSATE SODIUM 50 MG/5 ML
100 LIQUID (ML) ORAL DAILY
Status: DISCONTINUED | OUTPATIENT
Start: 2024-01-19 | End: 2024-01-19 | Stop reason: HOSPADM

## 2024-01-18 RX ORDER — IPRATROPIUM BROMIDE AND ALBUTEROL SULFATE 2.5; .5 MG/3ML; MG/3ML
3 SOLUTION RESPIRATORY (INHALATION) ONCE AS NEEDED
Status: DISCONTINUED | OUTPATIENT
Start: 2024-01-18 | End: 2024-01-18 | Stop reason: HOSPADM

## 2024-01-18 RX ORDER — ONDANSETRON 2 MG/ML
4 INJECTION INTRAMUSCULAR; INTRAVENOUS ONCE AS NEEDED
Status: DISCONTINUED | OUTPATIENT
Start: 2024-01-18 | End: 2024-01-18 | Stop reason: HOSPADM

## 2024-01-18 RX ORDER — SODIUM CHLORIDE, SODIUM LACTATE, POTASSIUM CHLORIDE, CALCIUM CHLORIDE 600; 310; 30; 20 MG/100ML; MG/100ML; MG/100ML; MG/100ML
75 INJECTION, SOLUTION INTRAVENOUS CONTINUOUS
Status: DISCONTINUED | OUTPATIENT
Start: 2024-01-18 | End: 2024-01-19 | Stop reason: HOSPADM

## 2024-01-18 RX ORDER — MIDAZOLAM HYDROCHLORIDE 1 MG/ML
1 INJECTION INTRAMUSCULAR; INTRAVENOUS ONCE
Qty: 2 ML | Refills: 0 | Status: COMPLETED | OUTPATIENT
Start: 2024-01-18 | End: 2024-01-18

## 2024-01-18 RX ORDER — BUPIVACAINE HYDROCHLORIDE 2.5 MG/ML
INJECTION, SOLUTION EPIDURAL; INFILTRATION; INTRACAUDAL
Status: COMPLETED | OUTPATIENT
Start: 2024-01-18 | End: 2024-01-18

## 2024-01-18 RX ORDER — CHOLESTYRAMINE LIGHT 4 G/5.7G
1 POWDER, FOR SUSPENSION ORAL EVERY 12 HOURS SCHEDULED
Status: DISCONTINUED | OUTPATIENT
Start: 2024-01-18 | End: 2024-01-19 | Stop reason: HOSPADM

## 2024-01-18 RX ORDER — CETIRIZINE HYDROCHLORIDE 10 MG/1
10 TABLET ORAL DAILY
Status: DISCONTINUED | OUTPATIENT
Start: 2024-01-18 | End: 2024-01-19 | Stop reason: HOSPADM

## 2024-01-18 RX ORDER — SCOLOPAMINE TRANSDERMAL SYSTEM 1 MG/1
1 PATCH, EXTENDED RELEASE TRANSDERMAL ONCE
Qty: 24 PATCH | Refills: 0 | Status: DISCONTINUED | OUTPATIENT
Start: 2024-01-18 | End: 2024-01-18

## 2024-01-18 RX ORDER — FAMOTIDINE 20 MG/1
20 TABLET, FILM COATED ORAL
Status: COMPLETED | OUTPATIENT
Start: 2024-01-18 | End: 2024-01-18

## 2024-01-18 RX ORDER — HYDROCODONE BITARTRATE AND ACETAMINOPHEN 5; 325 MG/1; MG/1
1 TABLET ORAL ONCE AS NEEDED
Status: DISCONTINUED | OUTPATIENT
Start: 2024-01-18 | End: 2024-01-18 | Stop reason: HOSPADM

## 2024-01-18 RX ORDER — DROPERIDOL 2.5 MG/ML
0.62 INJECTION, SOLUTION INTRAMUSCULAR; INTRAVENOUS
Status: DISCONTINUED | OUTPATIENT
Start: 2024-01-18 | End: 2024-01-18 | Stop reason: HOSPADM

## 2024-01-18 RX ORDER — DEXAMETHASONE SODIUM PHOSPHATE 10 MG/ML
INJECTION, SOLUTION INTRAMUSCULAR; INTRAVENOUS
Status: COMPLETED | OUTPATIENT
Start: 2024-01-18 | End: 2024-01-18

## 2024-01-18 RX ORDER — PROMETHAZINE HYDROCHLORIDE 6.25 MG/5ML
12.5 SYRUP ORAL EVERY 6 HOURS PRN
Status: DISCONTINUED | OUTPATIENT
Start: 2024-01-18 | End: 2024-01-19 | Stop reason: HOSPADM

## 2024-01-18 RX ORDER — MIDAZOLAM HYDROCHLORIDE 1 MG/ML
1 INJECTION INTRAMUSCULAR; INTRAVENOUS ONCE
Status: COMPLETED | OUTPATIENT
Start: 2024-01-18 | End: 2024-01-18

## 2024-01-18 RX ORDER — PROMETHAZINE HYDROCHLORIDE 25 MG/1
25 TABLET ORAL ONCE AS NEEDED
Status: DISCONTINUED | OUTPATIENT
Start: 2024-01-18 | End: 2024-01-18 | Stop reason: HOSPADM

## 2024-01-18 RX ORDER — ONDANSETRON 2 MG/ML
4 INJECTION INTRAMUSCULAR; INTRAVENOUS EVERY 6 HOURS PRN
Status: DISCONTINUED | OUTPATIENT
Start: 2024-01-18 | End: 2024-01-18

## 2024-01-18 RX ORDER — SUCRALFATE 1 G/1
1 TABLET ORAL
Qty: 84 TABLET | Refills: 0 | OUTPATIENT
Start: 2024-01-18 | End: 2024-02-08

## 2024-01-18 RX ORDER — PANTOPRAZOLE SODIUM 40 MG/1
40 TABLET, DELAYED RELEASE ORAL
Status: DISCONTINUED | OUTPATIENT
Start: 2024-01-19 | End: 2024-01-19 | Stop reason: HOSPADM

## 2024-01-18 RX ORDER — KETOROLAC TROMETHAMINE 15 MG/ML
30 INJECTION, SOLUTION INTRAMUSCULAR; INTRAVENOUS ONCE
Qty: 2 ML | Refills: 0 | Status: DISCONTINUED | OUTPATIENT
Start: 2024-01-18 | End: 2024-01-18 | Stop reason: HOSPADM

## 2024-01-18 RX ORDER — MONTELUKAST SODIUM 10 MG/1
10 TABLET ORAL NIGHTLY
Status: DISCONTINUED | OUTPATIENT
Start: 2024-01-18 | End: 2024-01-19 | Stop reason: HOSPADM

## 2024-01-18 RX ORDER — PROPRANOLOL HYDROCHLORIDE 20 MG/1
40 TABLET ORAL 2 TIMES DAILY
Status: DISCONTINUED | OUTPATIENT
Start: 2024-01-18 | End: 2024-01-19 | Stop reason: HOSPADM

## 2024-01-18 RX ORDER — EPHEDRINE SULFATE 50 MG/ML
INJECTION, SOLUTION INTRAVENOUS AS NEEDED
Status: DISCONTINUED | OUTPATIENT
Start: 2024-01-18 | End: 2024-01-18 | Stop reason: SURG

## 2024-01-18 RX ORDER — SODIUM CHLORIDE 9 MG/ML
40 INJECTION, SOLUTION INTRAVENOUS AS NEEDED
Status: DISCONTINUED | OUTPATIENT
Start: 2024-01-18 | End: 2024-01-18 | Stop reason: HOSPADM

## 2024-01-18 RX ORDER — FENTANYL CITRATE 50 UG/ML
INJECTION, SOLUTION INTRAMUSCULAR; INTRAVENOUS
Status: COMPLETED
Start: 2024-01-18 | End: 2024-01-18

## 2024-01-18 RX ORDER — HYDROMORPHONE HCL/0.9% NACL/PF 10 MG/50ML
PATIENT CONTROLLED ANALGESIA SYRINGE INTRAVENOUS CONTINUOUS
Status: DISCONTINUED | OUTPATIENT
Start: 2024-01-18 | End: 2024-01-19

## 2024-01-18 RX ORDER — LIDOCAINE HYDROCHLORIDE 10 MG/ML
INJECTION, SOLUTION EPIDURAL; INFILTRATION; INTRACAUDAL; PERINEURAL AS NEEDED
Status: DISCONTINUED | OUTPATIENT
Start: 2024-01-18 | End: 2024-01-18 | Stop reason: SURG

## 2024-01-18 RX ORDER — BUSPIRONE HYDROCHLORIDE 10 MG/1
15 TABLET ORAL 2 TIMES DAILY
Status: DISCONTINUED | OUTPATIENT
Start: 2024-01-18 | End: 2024-01-19 | Stop reason: HOSPADM

## 2024-01-18 RX ORDER — PROMETHAZINE HYDROCHLORIDE 25 MG/1
25 SUPPOSITORY RECTAL ONCE AS NEEDED
Status: DISCONTINUED | OUTPATIENT
Start: 2024-01-18 | End: 2024-01-18 | Stop reason: HOSPADM

## 2024-01-18 RX ORDER — HYDROMORPHONE HCL/0.9% NACL/PF 10 MG/50ML
PATIENT CONTROLLED ANALGESIA SYRINGE INTRAVENOUS
Status: COMPLETED
Start: 2024-01-18 | End: 2024-01-18

## 2024-01-18 RX ORDER — SODIUM CHLORIDE 0.9 % (FLUSH) 0.9 %
3 SYRINGE (ML) INJECTION EVERY 12 HOURS SCHEDULED
Status: DISCONTINUED | OUTPATIENT
Start: 2024-01-18 | End: 2024-01-18 | Stop reason: HOSPADM

## 2024-01-18 RX ORDER — MEPERIDINE HYDROCHLORIDE 25 MG/ML
12.5 INJECTION INTRAMUSCULAR; INTRAVENOUS; SUBCUTANEOUS
Status: DISCONTINUED | OUTPATIENT
Start: 2024-01-18 | End: 2024-01-18 | Stop reason: HOSPADM

## 2024-01-18 RX ORDER — FENTANYL CITRATE 50 UG/ML
50 INJECTION, SOLUTION INTRAMUSCULAR; INTRAVENOUS
Status: DISCONTINUED | OUTPATIENT
Start: 2024-01-18 | End: 2024-01-18 | Stop reason: HOSPADM

## 2024-01-18 RX ORDER — HYDROCHLOROTHIAZIDE 25 MG/1
25 TABLET ORAL DAILY
Status: DISCONTINUED | OUTPATIENT
Start: 2024-01-19 | End: 2024-01-19 | Stop reason: HOSPADM

## 2024-01-18 RX ORDER — NALOXONE HCL 0.4 MG/ML
0.1 VIAL (ML) INJECTION
Status: DISCONTINUED | OUTPATIENT
Start: 2024-01-18 | End: 2024-01-19 | Stop reason: HOSPADM

## 2024-01-18 RX ORDER — SIMETHICONE 80 MG
80 TABLET,CHEWABLE ORAL 4 TIMES DAILY PRN
Status: DISCONTINUED | OUTPATIENT
Start: 2024-01-18 | End: 2024-01-19 | Stop reason: HOSPADM

## 2024-01-18 RX ORDER — FENTANYL CITRATE 50 UG/ML
INJECTION, SOLUTION INTRAMUSCULAR; INTRAVENOUS AS NEEDED
Status: DISCONTINUED | OUTPATIENT
Start: 2024-01-18 | End: 2024-01-18 | Stop reason: SURG

## 2024-01-18 RX ORDER — DIPHENHYDRAMINE HYDROCHLORIDE 50 MG/ML
25 INJECTION INTRAMUSCULAR; INTRAVENOUS EVERY 6 HOURS PRN
Status: DISCONTINUED | OUTPATIENT
Start: 2024-01-18 | End: 2024-01-19 | Stop reason: HOSPADM

## 2024-01-18 RX ORDER — LIDOCAINE HYDROCHLORIDE 10 MG/ML
0.5 INJECTION, SOLUTION EPIDURAL; INFILTRATION; INTRACAUDAL; PERINEURAL ONCE AS NEEDED
Status: COMPLETED | OUTPATIENT
Start: 2024-01-18 | End: 2024-01-18

## 2024-01-18 RX ORDER — DROPERIDOL 2.5 MG/ML
0.62 INJECTION, SOLUTION INTRAMUSCULAR; INTRAVENOUS ONCE AS NEEDED
Status: DISCONTINUED | OUTPATIENT
Start: 2024-01-18 | End: 2024-01-18 | Stop reason: HOSPADM

## 2024-01-18 RX ORDER — SODIUM CHLORIDE, SODIUM LACTATE, POTASSIUM CHLORIDE, CALCIUM CHLORIDE 600; 310; 30; 20 MG/100ML; MG/100ML; MG/100ML; MG/100ML
9 INJECTION, SOLUTION INTRAVENOUS CONTINUOUS PRN
Status: DISCONTINUED | OUTPATIENT
Start: 2024-01-18 | End: 2024-01-18 | Stop reason: HOSPADM

## 2024-01-18 RX ORDER — ENALAPRILAT 1.25 MG/ML
1.25 INJECTION INTRAVENOUS EVERY 6 HOURS PRN
Status: DISCONTINUED | OUTPATIENT
Start: 2024-01-18 | End: 2024-01-19 | Stop reason: HOSPADM

## 2024-01-18 RX ORDER — HYDROMORPHONE HYDROCHLORIDE 1 MG/ML
0.5 INJECTION, SOLUTION INTRAMUSCULAR; INTRAVENOUS; SUBCUTANEOUS
Status: COMPLETED | OUTPATIENT
Start: 2024-01-18 | End: 2024-01-18

## 2024-01-18 RX ORDER — SUCRALFATE 1 G/1
1 TABLET ORAL
Status: DISCONTINUED | OUTPATIENT
Start: 2024-01-18 | End: 2024-01-19 | Stop reason: HOSPADM

## 2024-01-18 RX ORDER — ALPRAZOLAM 1 MG/1
1 TABLET ORAL 2 TIMES DAILY
Status: DISCONTINUED | OUTPATIENT
Start: 2024-01-18 | End: 2024-01-19 | Stop reason: HOSPADM

## 2024-01-18 RX ORDER — SODIUM CHLORIDE 0.9 % (FLUSH) 0.9 %
10 SYRINGE (ML) INJECTION AS NEEDED
Status: DISCONTINUED | OUTPATIENT
Start: 2024-01-18 | End: 2024-01-18 | Stop reason: HOSPADM

## 2024-01-18 RX ORDER — ALBUTEROL SULFATE 2.5 MG/3ML
2.5 SOLUTION RESPIRATORY (INHALATION) EVERY 4 HOURS PRN
Status: DISCONTINUED | OUTPATIENT
Start: 2024-01-18 | End: 2024-01-19 | Stop reason: HOSPADM

## 2024-01-18 RX ORDER — DEXMEDETOMIDINE HYDROCHLORIDE 100 UG/ML
INJECTION, SOLUTION INTRAVENOUS AS NEEDED
Status: DISCONTINUED | OUTPATIENT
Start: 2024-01-18 | End: 2024-01-18 | Stop reason: SURG

## 2024-01-18 RX ORDER — ONDANSETRON 2 MG/ML
INJECTION INTRAMUSCULAR; INTRAVENOUS AS NEEDED
Status: DISCONTINUED | OUTPATIENT
Start: 2024-01-18 | End: 2024-01-18 | Stop reason: SURG

## 2024-01-18 RX ORDER — BUDESONIDE 0.5 MG/2ML
0.5 INHALANT ORAL
Status: DISCONTINUED | OUTPATIENT
Start: 2024-01-18 | End: 2024-01-19 | Stop reason: HOSPADM

## 2024-01-18 RX ORDER — ROCURONIUM BROMIDE 10 MG/ML
INJECTION, SOLUTION INTRAVENOUS AS NEEDED
Status: DISCONTINUED | OUTPATIENT
Start: 2024-01-18 | End: 2024-01-18 | Stop reason: SURG

## 2024-01-18 RX ORDER — MAGNESIUM HYDROXIDE 1200 MG/15ML
LIQUID ORAL AS NEEDED
Status: DISCONTINUED | OUTPATIENT
Start: 2024-01-18 | End: 2024-01-18 | Stop reason: HOSPADM

## 2024-01-18 RX ORDER — BUPIVACAINE HCL/0.9 % NACL/PF 0.125 %
PLASTIC BAG, INJECTION (ML) EPIDURAL AS NEEDED
Status: DISCONTINUED | OUTPATIENT
Start: 2024-01-18 | End: 2024-01-18 | Stop reason: SURG

## 2024-01-18 RX ORDER — ONDANSETRON 2 MG/ML
4 INJECTION INTRAMUSCULAR; INTRAVENOUS EVERY 6 HOURS PRN
Status: DISCONTINUED | OUTPATIENT
Start: 2024-01-18 | End: 2024-01-19 | Stop reason: HOSPADM

## 2024-01-18 RX ORDER — SODIUM CHLORIDE 0.9 % (FLUSH) 0.9 %
10 SYRINGE (ML) INJECTION EVERY 12 HOURS SCHEDULED
Status: DISCONTINUED | OUTPATIENT
Start: 2024-01-18 | End: 2024-01-18 | Stop reason: HOSPADM

## 2024-01-18 RX ORDER — KETOROLAC TROMETHAMINE 30 MG/ML
INJECTION, SOLUTION INTRAMUSCULAR; INTRAVENOUS
Status: COMPLETED
Start: 2024-01-18 | End: 2024-01-18

## 2024-01-18 RX ORDER — PROPOFOL 10 MG/ML
VIAL (ML) INTRAVENOUS AS NEEDED
Status: DISCONTINUED | OUTPATIENT
Start: 2024-01-18 | End: 2024-01-18 | Stop reason: SURG

## 2024-01-18 RX ORDER — HYDRALAZINE HYDROCHLORIDE 20 MG/ML
5 INJECTION INTRAMUSCULAR; INTRAVENOUS
Status: DISCONTINUED | OUTPATIENT
Start: 2024-01-18 | End: 2024-01-18 | Stop reason: HOSPADM

## 2024-01-18 RX ORDER — ONDANSETRON 4 MG/1
4 TABLET, ORALLY DISINTEGRATING ORAL EVERY 6 HOURS PRN
Status: DISCONTINUED | OUTPATIENT
Start: 2024-01-18 | End: 2024-01-19 | Stop reason: HOSPADM

## 2024-01-18 RX ORDER — LISINOPRIL 20 MG/1
20 TABLET ORAL 2 TIMES DAILY
Status: DISCONTINUED | OUTPATIENT
Start: 2024-01-18 | End: 2024-01-19 | Stop reason: HOSPADM

## 2024-01-18 RX ORDER — ONDANSETRON 4 MG/1
4 TABLET, ORALLY DISINTEGRATING ORAL EVERY 6 HOURS PRN
Status: DISCONTINUED | OUTPATIENT
Start: 2024-01-18 | End: 2024-01-18

## 2024-01-18 RX ORDER — SIMETHICONE 80 MG
TABLET,CHEWABLE ORAL
Status: COMPLETED
Start: 2024-01-18 | End: 2024-01-18

## 2024-01-18 RX ORDER — DEXAMETHASONE SODIUM PHOSPHATE 4 MG/ML
INJECTION, SOLUTION INTRA-ARTICULAR; INTRALESIONAL; INTRAMUSCULAR; INTRAVENOUS; SOFT TISSUE AS NEEDED
Status: DISCONTINUED | OUTPATIENT
Start: 2024-01-18 | End: 2024-01-18 | Stop reason: SURG

## 2024-01-18 RX ORDER — NALOXONE HCL 0.4 MG/ML
0.4 VIAL (ML) INJECTION AS NEEDED
Status: DISCONTINUED | OUTPATIENT
Start: 2024-01-18 | End: 2024-01-18 | Stop reason: HOSPADM

## 2024-01-18 RX ORDER — MIDAZOLAM HYDROCHLORIDE 1 MG/ML
INJECTION INTRAMUSCULAR; INTRAVENOUS
Status: COMPLETED
Start: 2024-01-18 | End: 2024-01-18

## 2024-01-18 RX ORDER — HEPARIN SODIUM 5000 [USP'U]/ML
5000 INJECTION, SOLUTION INTRAVENOUS; SUBCUTANEOUS EVERY 8 HOURS SCHEDULED
Status: DISCONTINUED | OUTPATIENT
Start: 2024-01-19 | End: 2024-01-19 | Stop reason: HOSPADM

## 2024-01-18 RX ADMIN — BUDESONIDE 0.5 MG: 0.5 INHALANT RESPIRATORY (INHALATION) at 21:12

## 2024-01-18 RX ADMIN — HYDROMORPHONE HYDROCHLORIDE 0.5 MG: 1 INJECTION, SOLUTION INTRAMUSCULAR; INTRAVENOUS; SUBCUTANEOUS at 16:45

## 2024-01-18 RX ADMIN — BUPIVACAINE HYDROCHLORIDE 60 ML: 2.5 INJECTION, SOLUTION EPIDURAL; INFILTRATION; INTRACAUDAL; PERINEURAL at 13:09

## 2024-01-18 RX ADMIN — SUGAMMADEX 200 MG: 100 INJECTION, SOLUTION INTRAVENOUS at 15:01

## 2024-01-18 RX ADMIN — DEXMEDETOMIDINE HYDROCHLORIDE 10 MCG: 100 INJECTION, SOLUTION INTRAVENOUS at 14:28

## 2024-01-18 RX ADMIN — LISINOPRIL 20 MG: 20 TABLET ORAL at 22:30

## 2024-01-18 RX ADMIN — ALPRAZOLAM 1 MG: 1 TABLET ORAL at 22:30

## 2024-01-18 RX ADMIN — DEXMEDETOMIDINE HYDROCHLORIDE 10 MCG: 100 INJECTION, SOLUTION INTRAVENOUS at 13:50

## 2024-01-18 RX ADMIN — FENTANYL CITRATE 50 MCG: 50 INJECTION, SOLUTION INTRAMUSCULAR; INTRAVENOUS at 15:32

## 2024-01-18 RX ADMIN — HYDROMORPHONE HYDROCHLORIDE 0.5 MG: 1 INJECTION, SOLUTION INTRAMUSCULAR; INTRAVENOUS; SUBCUTANEOUS at 15:49

## 2024-01-18 RX ADMIN — MIDAZOLAM HYDROCHLORIDE 1 MG: 1 INJECTION INTRAMUSCULAR; INTRAVENOUS at 16:19

## 2024-01-18 RX ADMIN — HYDROMORPHONE HYDROCHLORIDE 0.5 MG: 1 INJECTION, SOLUTION INTRAMUSCULAR; INTRAVENOUS; SUBCUTANEOUS at 16:00

## 2024-01-18 RX ADMIN — ROCURONIUM BROMIDE 60 MG: 10 SOLUTION INTRAVENOUS at 13:08

## 2024-01-18 RX ADMIN — FENTANYL CITRATE 100 MCG: 50 INJECTION, SOLUTION INTRAMUSCULAR; INTRAVENOUS at 13:08

## 2024-01-18 RX ADMIN — CETIRIZINE HYDROCHLORIDE 10 MG: 10 TABLET, FILM COATED ORAL at 22:30

## 2024-01-18 RX ADMIN — KETOROLAC TROMETHAMINE 30 MG: 30 INJECTION, SOLUTION INTRAMUSCULAR; INTRAVENOUS at 16:12

## 2024-01-18 RX ADMIN — MIDAZOLAM HYDROCHLORIDE 1 MG: 1 INJECTION, SOLUTION INTRAMUSCULAR; INTRAVENOUS at 16:35

## 2024-01-18 RX ADMIN — SODIUM CHLORIDE, POTASSIUM CHLORIDE, SODIUM LACTATE AND CALCIUM CHLORIDE 9 ML/HR: 600; 310; 30; 20 INJECTION, SOLUTION INTRAVENOUS at 11:47

## 2024-01-18 RX ADMIN — SIMETHICONE 80 MG: 80 TABLET, CHEWABLE ORAL at 16:12

## 2024-01-18 RX ADMIN — HYDROMORPHONE HYDROCHLORIDE 0.5 MG: 1 INJECTION, SOLUTION INTRAMUSCULAR; INTRAVENOUS; SUBCUTANEOUS at 15:39

## 2024-01-18 RX ADMIN — CHOLESTYRAMINE 4 G: 4 POWDER, FOR SUSPENSION ORAL at 22:31

## 2024-01-18 RX ADMIN — PROPRANOLOL HYDROCHLORIDE 40 MG: 20 TABLET ORAL at 22:30

## 2024-01-18 RX ADMIN — Medication 100 MCG: at 13:33

## 2024-01-18 RX ADMIN — SODIUM CHLORIDE 2000 MG: 900 INJECTION INTRAVENOUS at 13:15

## 2024-01-18 RX ADMIN — MONTELUKAST 10 MG: 10 TABLET, FILM COATED ORAL at 22:30

## 2024-01-18 RX ADMIN — DEXAMETHASONE SODIUM PHOSPHATE 4 MG: 10 INJECTION, SOLUTION INTRAMUSCULAR; INTRAVENOUS at 13:09

## 2024-01-18 RX ADMIN — HYDROMORPHONE HYDROCHLORIDE: 10 INJECTION, SOLUTION INTRAMUSCULAR; INTRAVENOUS; SUBCUTANEOUS at 15:33

## 2024-01-18 RX ADMIN — LIDOCAINE HYDROCHLORIDE 50 MG: 10 INJECTION, SOLUTION EPIDURAL; INFILTRATION; INTRACAUDAL; PERINEURAL at 13:08

## 2024-01-18 RX ADMIN — ONDANSETRON 4 MG: 2 INJECTION INTRAMUSCULAR; INTRAVENOUS at 15:00

## 2024-01-18 RX ADMIN — DEXAMETHASONE SODIUM PHOSPHATE 8 MG: 4 INJECTION, SOLUTION INTRAMUSCULAR; INTRAVENOUS at 13:15

## 2024-01-18 RX ADMIN — MIDAZOLAM HYDROCHLORIDE 1 MG: 1 INJECTION, SOLUTION INTRAMUSCULAR; INTRAVENOUS at 16:19

## 2024-01-18 RX ADMIN — ROCURONIUM BROMIDE 20 MG: 10 SOLUTION INTRAVENOUS at 14:08

## 2024-01-18 RX ADMIN — HYDROMORPHONE HYDROCHLORIDE 0.5 MG: 1 INJECTION, SOLUTION INTRAMUSCULAR; INTRAVENOUS; SUBCUTANEOUS at 15:27

## 2024-01-18 RX ADMIN — DEXMEDETOMIDINE HYDROCHLORIDE 10 MCG: 100 INJECTION, SOLUTION INTRAVENOUS at 14:55

## 2024-01-18 RX ADMIN — ROCURONIUM BROMIDE 20 MG: 10 SOLUTION INTRAVENOUS at 14:42

## 2024-01-18 RX ADMIN — ONDANSETRON 4 MG: 2 INJECTION INTRAMUSCULAR; INTRAVENOUS at 21:46

## 2024-01-18 RX ADMIN — FAMOTIDINE 20 MG: 20 TABLET ORAL at 11:48

## 2024-01-18 RX ADMIN — FENTANYL CITRATE 50 MCG: 50 INJECTION, SOLUTION INTRAMUSCULAR; INTRAVENOUS at 15:24

## 2024-01-18 RX ADMIN — SODIUM CHLORIDE, POTASSIUM CHLORIDE, SODIUM LACTATE AND CALCIUM CHLORIDE: 600; 310; 30; 20 INJECTION, SOLUTION INTRAVENOUS at 14:41

## 2024-01-18 RX ADMIN — PROPOFOL 25 MCG/KG/MIN: 10 INJECTION, EMULSION INTRAVENOUS at 13:15

## 2024-01-18 RX ADMIN — BUSPIRONE HYDROCHLORIDE 15 MG: 10 TABLET ORAL at 22:30

## 2024-01-18 RX ADMIN — SCOPOLAMINE 1 PATCH: 1.5 PATCH, EXTENDED RELEASE TRANSDERMAL at 11:49

## 2024-01-18 RX ADMIN — EPHEDRINE SULFATE 10 MG: 50 INJECTION INTRAVENOUS at 13:35

## 2024-01-18 RX ADMIN — Medication 100 MCG: at 14:14

## 2024-01-18 RX ADMIN — CLONIDINE HYDROCHLORIDE 0.1 MG: 0.1 TABLET ORAL at 22:30

## 2024-01-18 RX ADMIN — PROPOFOL 250 MG: 10 INJECTION, EMULSION INTRAVENOUS at 13:08

## 2024-01-18 RX ADMIN — LIDOCAINE HYDROCHLORIDE 0.5 ML: 10 INJECTION, SOLUTION EPIDURAL; INFILTRATION; INTRACAUDAL; PERINEURAL at 11:47

## 2024-01-18 RX ADMIN — SUCRALFATE 1 G: 1 TABLET ORAL at 22:30

## 2024-01-18 RX ADMIN — Medication: at 15:33

## 2024-01-18 NOTE — ANESTHESIA PROCEDURE NOTES
Airway  Urgency: elective    Date/Time: 1/18/2024 1:10 PM  Airway not difficult    General Information and Staff    Patient location during procedure: OR  CRNA/CAA: Chelsea Anderson CRNA    Indications and Patient Condition  Indications for airway management: airway protection    Preoxygenated: yes  Mask difficulty assessment: 2 - vent by mask + OA or adjuvant +/- NMBA    Final Airway Details  Final airway type: endotracheal airway      Successful airway: ETT  Cuffed: yes   Successful intubation technique: direct laryngoscopy and video laryngoscopy  Facilitating devices/methods: intubating stylet  Endotracheal tube insertion site: oral  Blade: Villeda  Blade size: 3  ETT size (mm): 7.0  Cormack-Lehane Classification: grade I - full view of glottis  Placement verified by: chest auscultation and capnometry   Measured from: teeth  ETT/EBT  to teeth (cm): 21  Number of attempts at approach: 1  Assessment: lips, teeth, and gum same as pre-op and atraumatic intubation    Additional Comments  Negative epigastric sound, Breath sounds equal bilaterally with symmetric chest rise and fall.

## 2024-01-18 NOTE — OP NOTE
OPERATIVE NOTE    Patient Name:  Isi Bolton  YOB: 1970  [id number]    1/18/2024        PREOPERATIVE DIAGNOSIS: Gastric diverticulum        POSTOPERATIVE DIAGNOSIS: Same         PROCEDURE PERFORMED:    Laparoscopic resection of gastric diverticulum, Hill fundoplication, EGD         SURGEON: Vishal Haji MD       ASSISTANT: Júnior Hines PA-C. Júnior Hines PA-C   was responsible for performing the following activities: Retraction, Suction, Closing, Placing Dressing, and Held/Positioned Camera and their skilled assistance was necessary for the success of this case.        SPECIMENS: Gastric diverticulum         ANESTHESIA: General.     EBL: Minimal          FINDINGS:   Gastric diverticulum of the cardia completely resected  Hill fundoplication performed to prevent reflux         INDICATIONS:    Isi Bolton is a very pleasant 53 y.o. female with a history of abdominal pain and a gastric diverticulum. After a complete preoperative workup they were deemed an operative candidate. The risks and benefits were discussed at length with the patient and their family and they agreed to proceed.         DESCRIPTION OF PROCEDURE:      After obtaining informed consent, the patient was taken to the operating room and placed in supine position. After appropriate DVT and antibiotic prophylaxis, general anesthesia was induced. The abdomen was prepped and draped in standard sterile fashion.  After infiltrating the skin with local anesthetic a 12 mm skin incision was made approximately 10 cm from xiphoid process along the left costal margin. An optically guided trocar was then advanced through the abdominal wall into the abdominal cavity without difficulty. The abdomen was insufflated with carbon dioxide gas to a pressure of 15 mmHg. The laparoscope was then advanced through the trocar and the abdominal contents inspected. There was no evidence of bowel bladder or visceral injury with entry of  the trocar. At this point after infiltrating the appropriate areas with local anesthetic a standard laparoscopic Nissen fundoplication port placement schema was followed. A liver retractor was used to retract the liver anteriorly.     Using a combination of electrocautery and ultrasonic dissection the greater curvature of the stomach was mobilized up to the left hugo.  The gastric diverticulum was identified and located on the posterior portion of the cardia abutting the spleen.  Using careful blunt and LigaSure dissection it was fully mobilized up to the left hugo.  The pars flaccida was then divided superior and inferior to the hepatic branch of the vagus nerve which was spared throughout the procedure. The anterior surface of the right hugo was then scored and using meticulous blunt dissection a retroesophageal window to the left side was created. A Penrose was placed through this and used to encircle the GE junction. A circumferential mobilization of the GE junction from the hiatus was performed using a combination of electrocautery ultrasonic and blunt dissection. This dissection was carried up into the mediastinum 2 to 3 cm to allow for adequate mobilization of the esophagus.  The anterior and posterior vagus nerves were identified and spared throughout the procedure. The right and left pleural spaces were identified and spared throughout the procedure. After complete mobilization the anterior fat pad was taken off the anterior surface of the stomach using ultrasonic dissection and discarded.    The anesthetist then advanced a 50 Lao bougie through the mouth to the esophagus and into the stomach without difficulty.  The gastric diverticulum was located on the cardia of the stomach, and was well away from the GE junction.  We were then able to resect the diverticulum using a blue load of the Endo SILVERIO stapler.  The diverticulum was then placed in an Endo Catch bag and left in the lower abdomen.    The staple  line was then oversewn using silk sutures in Lembert fashion. An endoscope was then advanced through the mouth and the esophagus into the stomach under direct visualization.  The scope was then advanced into the stomach.  The staple line was intact without bleeding.  The scope was advanced to the duodenum which was completely normal.  The scope was returned to the stomach.  There was some retained food in the stomach.  Under maximal insufflation and retroflexed view of the GE junction demonstrated patent GE junction without encroachment the stable and on the GE junction.  The by insufflated the stomach and irrigating laparoscopically over the staple line, there is no evidence of air leak whatsoever.  The endoscope was then used to desufflate the stomach and removed from the patient mouth without difficulty.     A posterior hiatal cruroplasty was then performed using pledgeted 0 silk sutures. This created a snug closure of the hiatus around the esophagus. A loose Hill fundoplication was then performed using silk sutures. At the completion of the fundoplication, the Penrose drain was removed and was discarded.    The specimen bag was then removed from the left subcostal trocar site and passed off as specimen.     The liver retractor was then removed. All trocars were then removed under direct and laparoscopic visualization and the abdomen desufflated. The incisions were then closed using running subcuticular sutures and dressed with dry sterile dressings.     All sponge and needle counts were correct times two at the completion of the procedure. The patient recovered from anesthesia, was extubated in the operating room and was transported to the PACU in stable condition.    COMPLICATIONS: None             Vishal Haji MD  1/18/2024  15:06 EST

## 2024-01-18 NOTE — ANESTHESIA PROCEDURE NOTES
"Peripheral Block      Patient reassessed immediately prior to procedure    Patient location during procedure: OR  Start time: 1/18/2024 1:09 PM  Stop time: 1/18/2024 1:14 PM  Reason for block: at surgeon's request and post-op pain management  Performed by  CRNA/CAA: Jonathan Cates CRNASRNA: Tori Valencia SRNA  Preanesthetic Checklist  Completed: patient identified, IV checked, site marked, risks and benefits discussed, surgical consent, monitors and equipment checked, pre-op evaluation and timeout performed  Prep:  Pt Position: supine  Sterile barriers:cap, gloves, mask and washed/disinfected hands  Prep: ChloraPrep  Patient monitoring: blood pressure monitoring, continuous pulse oximetry and EKG  Procedure    Sedation: yes  Performed under: general  Guidance:ultrasound guided  Images:still images obtained, printed/placed on chart    Laterality:Bilateral  Block Type:TAP  Injection Technique:single-shot  Needle Type:short-bevel and echogenic  Needle Gauge:20 G  Resistance on Injection: none    Medications Used: dexamethasone sodium phosphate injection - Injection   4 mg - 1/18/2024 1:09:00 PM  bupivacaine PF (MARCAINE) 0.25 % injection - Injection   60 mL - 1/18/2024 1:09:00 PM      Medications  Comment:Block Injection:  LA dose divided between Right and Left block        Post Assessment  Injection Assessment: negative aspiration for heme, incremental injection and no paresthesia on injection  Patient Tolerance:comfortable throughout block  Complications:no  Additional Notes    Subcostal TAPs    A high-frequency linear transducer, with sterile cover, was placed sub-xiphoid to identify Linea Alba, right and left Rectus Abdominus Muscles (SAM). The transducer was moved either right or left subcostally to identify the SAM and the Transverse Abdominus Muscle (DURAN). The insertion site was prepped in sterile fashion and then localized with 2-5 ml of 1% Lidocaine. Using ultrasound-guidance, a 20-gauge B-Gibson 4\" " "Ultraplex 360 non-stimulating echogenic needle was advanced in plane, from medial to lateral, until the tip of the needle was in the fascial plane between the SAM and DURAN. 1-3ml of preservative free normal saline was used to hydro-dissect the fascial planes. After the fascial plane was verified, the local anesthetic (LA) was injected. The procedure was repeated on the opposite side for bilateral coverage. Aspiration every 5 ml to prevent intravascular injection. Injection was completed with negative aspiration of blood and negative intravascular injection. Injection pressures were normal with minimal resistance. The subcostal approach to the TAP nerve block ideally anesthetizes the intercostal nerves T6-T9.     Mid-Axillary/Lateral TAPs    A high-frequency linear transducer, with sterile cover, was placed in the midaxillary line between the ASIS and costal margin. The External Oblique Muscle (EOM), Internal Oblique Muscle (IOM), Transverse Abdominus Muscle (DURAN), and Peritoneum were identified. The insertion site was prepped in sterile fashion and then localized with 2-5 ml of 1% Lidocaine. Using ultrasound-guidance, a 20-gauge B-Gibson 4\" Ultraplex 360 non-stimulating echogenic needle was advanced in plane, from medial to lateral, until the tip of the needle was in the fascial plane between the IOM and DURAN. 1-3ml of preservative free normal saline was used to hydro-dissect the fascial planes. After the fascial plane was verified, the local anesthetic (LA) was injected. The procedure was repeated on the opposite side for bilateral coverage. Aspiration every 5 ml to prevent intravascular injection. Injection was completed with negative aspiration of blood and negative intravascular injection. Injection pressures were normal with minimal resistance. Midaxillary TAPs should reach intercostal nerves T10- T11 and the subcostal nerve T12.              "

## 2024-01-18 NOTE — PROGRESS NOTES
"Patient Name:  Isi Bolton  YOB: 1970  [id number]    Surgery Post - Operative Note    Date of visit: 1/18/2024    Subjective   Subjective: Complains of soreness, otherwise stable.       Objective     Objective:    /99 (BP Location: Right arm, Patient Position: Lying)   Pulse 83   Temp 97.8 °F (36.6 °C) (Temporal)   Resp 18   Ht 165.1 cm (65\")   Wt 66.3 kg (146 lb 2.6 oz)   SpO2 100%   BMI 24.32 kg/m²     CV:  Rate  regular and rhythm  regular  L:  Clear  to auscultation bilaterally   ABD:  Soft, appropriately tender. Dressings  clean, dry and intact   EXT:  No cyanosis, clubbing or edema             Assessment/ Plan: Doing well after Lap resection of gastric mass. Continue Pulmonary toilet    Problem List Items Addressed This Visit          Gastrointestinal Abdominal     Gastritis    Relevant Medications    famotidine (PEPCID) tablet 20 mg (Completed)    Other Relevant Orders    Tissue Pathology Exam        Active Hospital Problems    Diagnosis  POA    **Gastric diverticulum [K31.4]  Yes    Anxiety [F41.9]  Yes    Essential hypertension [I10]  Yes      Resolved Hospital Problems   No resolved problems to display.            Vishal Haji MD  1/18/2024  15:51 EST    "

## 2024-01-18 NOTE — ANESTHESIA POSTPROCEDURE EVALUATION
Patient: Isi Bolton    Procedure Summary       Date: 01/18/24 Room / Location:  [facility] /  [facility]    Anesthesia Start: 1305 Anesthesia Stop: 1517    Procedure: LAPAROSCOPIC RESECTION GASTRIC DIVERTICULUM, AND HILL FUNDOPLICATIO (Abdomen) Diagnosis:     Surgeons: Vishal Haji MD Provider: Dm Crowley MD    Anesthesia Type: general ASA Status: 3            Anesthesia Type: general    Vitals  Vitals Value Taken Time   /74 01/18/24 1513   Temp     Pulse 87 01/18/24 1517   Resp     SpO2 100 % 01/18/24 1517   Vitals shown include unfiled device data.        Post Anesthesia Care and Evaluation    Patient location during evaluation: PACU  Patient participation: complete - patient cannot participate  Level of consciousness: sleepy but conscious  Pain management: adequate    Airway patency: patent  Anesthetic complications: No anesthetic complications  PONV Status: none  Cardiovascular status: hemodynamically stable and acceptable  Respiratory status: nonlabored ventilation, acceptable and nasal cannula  Hydration status: acceptable

## 2024-01-18 NOTE — ANESTHESIA PREPROCEDURE EVALUATION
Anesthesia Evaluation     Patient summary reviewed and Nursing notes reviewed   history of anesthetic complications:  PONV  NPO Solid Status: > 8 hours  NPO Liquid Status: > 2 hours           Airway   Mallampati: I  TM distance: >3 FB  Neck ROM: full  No difficulty expected  Dental      Pulmonary    (+) asthma (well controlled witb PRN MDI),  (-) shortness of breath, recent URI, sleep apnea, not a smoker  Cardiovascular     ECG reviewed  Patient on routine beta blocker    (+) hypertension, valvular problems/murmurs MR and TI  (-) past MI, dysrhythmias, angina (related to Valvular disease), cardiac stents    ROS comment: ECG NSR   ECHO EF > 61 %.RVSP normal (<35 mmHg).Mild MR TI normal (<35 mmHg).  MPS 2022 normal no ischemia  low risk EF 67%         Neuro/Psych  (-) seizures, CVA  GI/Hepatic/Renal/Endo    (-) no renal disease, diabetes, no thyroid disorder    Musculoskeletal     Abdominal    Substance History      OB/GYN          Other      history of cancer    ROS/Med Hx Other: HCT 32     Low Na will repaet                 Anesthesia Plan    ASA 3     general     intravenous induction     Anesthetic plan, risks, benefits, and alternatives have been provided, discussed and informed consent has been obtained with: patient.    Plan discussed with CRNA.      CODE STATUS:

## 2024-01-18 NOTE — BRIEF OP NOTE
Resection of gastric diverticulum and Hill fundoplication LAPAROSCOPIC  Progress Note    Isi Bolton  1/18/2024    Pre-op Diagnosis:   Gastric diverticulum       Post-Op Diagnosis Codes:  Same    Procedure/CPT® Codes:        Procedure(s):  LAPAROSCOPIC RESECTION GASTRIC DIVERTICULUM, AND HILL FUNDAL DEFECATION              Surgeon(s):  Vishal Haji MD    Anesthesia: General with Block    Staff:   Circulator: Diane Mojica RN  Scrub Person: Leonila Morales; Rolando Shelby  Nursing Assistant: Vinita Phoenix PCT Andrea Marchyn, PA-C   was responsible for performing the following activities: Retraction, Suction, Closing, Placing Dressing, and Held/Positioned Camera and their skilled assistance was necessary for the success of this case.        Estimated Blood Loss: minimal    Urine Voided: * No values recorded between 1/18/2024  1:05 PM and 1/18/2024  2:58 PM *    Specimens:                Gastric diverticulum          Drains: * No LDAs found *    Findings:   Gastric diverticulum of the cardia completely resected  Hill fundoplication performed to prevent reflux        Complications: None          Vishal Haji MD     Date: 1/18/2024  Time: 15:04 EST

## 2024-01-18 NOTE — INTERVAL H&P NOTE
[facility] Pre-op    Full history and physical note from office is attached.    VS: /81  HR 86  RR 16  T 96.7  Sat 98%RA    LAB Results:  Lab Results   Component Value Date    WBC 7.20 01/17/2024    HGB 10.9 (L) 01/17/2024    HCT 32.7 (L) 01/17/2024    MCV 98.8 (H) 01/17/2024     01/17/2024    NEUTROABS 3.19 12/26/2023    GLUCOSE 123 (H) 01/17/2024    BUN 21 (H) 01/17/2024    CREATININE 1.00 01/17/2024    EGFRIFNONA 84 04/06/2017     (L) 01/17/2024    K 4.3 01/17/2024    CL 89 (L) 01/17/2024    CO2 25.0 01/17/2024    MG 1.5 (L) 12/26/2023    CALCIUM 9.8 01/17/2024    ALBUMIN 4.8 12/25/2023    AST 28 12/25/2023    ALT 22 12/25/2023    BILITOT 0.9 12/25/2023       Study Result    Narrative & Impression   CT ABDOMEN PELVIS WO CONTRAST     Date of Exam: 12/25/2023 5:53 PM EST     Indication: Nausea/vomiting  intractable vomiting for 48 hours, hx of lap tad and BTL previously.     Comparison: None available.     Technique: Axial CT images were obtained of the abdomen and pelvis without the administration of contrast. Reconstructed coronal and sagittal images were also obtained. Automated exposure control and iterative construction methods were used.        Findings:  There is no definite abnormality of the liver. Patient's had a cholecystectomy. The spleen and pancreas do not appear unusual. There is no acute renal abnormality. There is a tiny nonobstructing calculus lower pole of both kidneys. The bladder is   contracted.     There is minimal free fluid in the pelvis which is of questionable significance.     There is no bowel obstruction. Appendix is minimally prominent in transverse dimension measuring 7 mm without underlying inflammation around this area. There is limited stranding in the fat around the third portion the duodenum. This might relate to a   duodenitis     Impression:  1.There is minimal free fluid in the pelvis which is of questionable significance.  2.Nonobstructive  intrarenal calculi  3.Appendix is slightly prominent in size without obvious inflammation around this area.  4.Minimal stranding in the fat around the third portion the duodenum which could relate to a duodenitis     Cancer Staging (if applicable)  Cancer Patient: __ yes __no __unknown__N/A; If yes, clinical stage T:__ N:__M:__, stage group or __N/A      Impression: Gastric diverticulum       Plan: LAPAROSCOPIC RESECTION GASTRIC DIVERTICULUM       VERONIQUE Mann   1/18/2024   11:05 EST

## 2024-01-19 ENCOUNTER — APPOINTMENT (OUTPATIENT)
Dept: GENERAL RADIOLOGY | Facility: HOSPITAL | Age: 54
End: 2024-01-19
Payer: COMMERCIAL

## 2024-01-19 VITALS
HEART RATE: 56 BPM | RESPIRATION RATE: 16 BRPM | SYSTOLIC BLOOD PRESSURE: 100 MMHG | DIASTOLIC BLOOD PRESSURE: 64 MMHG | BODY MASS INDEX: 24.35 KG/M2 | OXYGEN SATURATION: 93 % | HEIGHT: 65 IN | TEMPERATURE: 97.9 F | WEIGHT: 146.16 LBS

## 2024-01-19 LAB
ANION GAP SERPL CALCULATED.3IONS-SCNC: 14 MMOL/L (ref 5–15)
BASOPHILS # BLD AUTO: 0.01 10*3/MM3 (ref 0–0.2)
BASOPHILS NFR BLD AUTO: 0.1 % (ref 0–1.5)
BUN SERPL-MCNC: 17 MG/DL (ref 6–20)
BUN/CREAT SERPL: 17.3 (ref 7–25)
CALCIUM SPEC-SCNC: 8.4 MG/DL (ref 8.6–10.5)
CHLORIDE SERPL-SCNC: 89 MMOL/L (ref 98–107)
CO2 SERPL-SCNC: 24 MMOL/L (ref 22–29)
CREAT SERPL-MCNC: 0.98 MG/DL (ref 0.57–1)
DEPRECATED RDW RBC AUTO: 42.5 FL (ref 37–54)
EGFRCR SERPLBLD CKD-EPI 2021: 68.7 ML/MIN/1.73
EOSINOPHIL # BLD AUTO: 0 10*3/MM3 (ref 0–0.4)
EOSINOPHIL NFR BLD AUTO: 0 % (ref 0.3–6.2)
ERYTHROCYTE [DISTWIDTH] IN BLOOD BY AUTOMATED COUNT: 11.6 % (ref 12.3–15.4)
GLUCOSE SERPL-MCNC: 118 MG/DL (ref 65–99)
HCT VFR BLD AUTO: 29.1 % (ref 34–46.6)
HGB BLD-MCNC: 10 G/DL (ref 12–15.9)
IMM GRANULOCYTES # BLD AUTO: 0.05 10*3/MM3 (ref 0–0.05)
IMM GRANULOCYTES NFR BLD AUTO: 0.6 % (ref 0–0.5)
LYMPHOCYTES # BLD AUTO: 1.22 10*3/MM3 (ref 0.7–3.1)
LYMPHOCYTES NFR BLD AUTO: 14 % (ref 19.6–45.3)
MCH RBC QN AUTO: 34.1 PG (ref 26.6–33)
MCHC RBC AUTO-ENTMCNC: 34.4 G/DL (ref 31.5–35.7)
MCV RBC AUTO: 99.3 FL (ref 79–97)
MONOCYTES # BLD AUTO: 0.39 10*3/MM3 (ref 0.1–0.9)
MONOCYTES NFR BLD AUTO: 4.5 % (ref 5–12)
NEUTROPHILS NFR BLD AUTO: 7.05 10*3/MM3 (ref 1.7–7)
NEUTROPHILS NFR BLD AUTO: 80.8 % (ref 42.7–76)
NRBC BLD AUTO-RTO: 0 /100 WBC (ref 0–0.2)
PLATELET # BLD AUTO: 287 10*3/MM3 (ref 140–450)
PMV BLD AUTO: 9.3 FL (ref 6–12)
POTASSIUM SERPL-SCNC: 5.1 MMOL/L (ref 3.5–5.2)
RBC # BLD AUTO: 2.93 10*6/MM3 (ref 3.77–5.28)
SODIUM SERPL-SCNC: 127 MMOL/L (ref 136–145)
WBC NRBC COR # BLD AUTO: 8.72 10*3/MM3 (ref 3.4–10.8)

## 2024-01-19 PROCEDURE — 0 DIATRIZOATE MEGLUMINE & SODIUM PER 1 ML: Performed by: SURGERY

## 2024-01-19 PROCEDURE — 85025 COMPLETE CBC W/AUTO DIFF WBC: CPT | Performed by: SURGERY

## 2024-01-19 PROCEDURE — 80048 BASIC METABOLIC PNL TOTAL CA: CPT | Performed by: SURGERY

## 2024-01-19 PROCEDURE — 94664 DEMO&/EVAL PT USE INHALER: CPT

## 2024-01-19 PROCEDURE — 25010000002 ONDANSETRON PER 1 MG: Performed by: SURGERY

## 2024-01-19 PROCEDURE — 74240 X-RAY XM UPR GI TRC 1CNTRST: CPT

## 2024-01-19 PROCEDURE — 25010000002 HEPARIN (PORCINE) PER 1000 UNITS: Performed by: SURGERY

## 2024-01-19 PROCEDURE — 25010000002 METOCLOPRAMIDE PER 10 MG: Performed by: SURGERY

## 2024-01-19 PROCEDURE — 94799 UNLISTED PULMONARY SVC/PX: CPT

## 2024-01-19 RX ORDER — NALOXONE HYDROCHLORIDE 4 MG/.1ML
SPRAY NASAL
Qty: 2 EACH | Refills: 0 | Status: SHIPPED | OUTPATIENT
Start: 2024-01-19

## 2024-01-19 RX ORDER — PROMETHAZINE HYDROCHLORIDE 6.25 MG/5ML
12.5 SYRUP ORAL EVERY 6 HOURS PRN
Qty: 600 ML | Refills: 1 | Status: SHIPPED | OUTPATIENT
Start: 2024-01-19

## 2024-01-19 RX ORDER — METOCLOPRAMIDE HYDROCHLORIDE 5 MG/ML
10 INJECTION INTRAMUSCULAR; INTRAVENOUS EVERY 6 HOURS SCHEDULED
Status: DISCONTINUED | OUTPATIENT
Start: 2024-01-19 | End: 2024-01-19 | Stop reason: HOSPADM

## 2024-01-19 RX ORDER — HYDROMORPHONE HYDROCHLORIDE 1 MG/ML
0.2 INJECTION, SOLUTION INTRAMUSCULAR; INTRAVENOUS; SUBCUTANEOUS
Status: DISCONTINUED | OUTPATIENT
Start: 2024-01-19 | End: 2024-01-19 | Stop reason: HOSPADM

## 2024-01-19 RX ORDER — DOCUSATE SODIUM 50 MG/5 ML
100 LIQUID (ML) ORAL DAILY
Qty: 60 ML | Refills: 1 | Status: SHIPPED | OUTPATIENT
Start: 2024-01-20

## 2024-01-19 RX ORDER — SUCRALFATE 1 G/1
1 TABLET ORAL
Qty: 60 TABLET | Refills: 1 | Status: SHIPPED | OUTPATIENT
Start: 2024-01-19

## 2024-01-19 RX ADMIN — ALPRAZOLAM 1 MG: 1 TABLET ORAL at 08:18

## 2024-01-19 RX ADMIN — CHOLESTYRAMINE 4 G: 4 POWDER, FOR SUSPENSION ORAL at 08:19

## 2024-01-19 RX ADMIN — DOCUSATE SODIUM 100 MG: 50 LIQUID ORAL at 08:19

## 2024-01-19 RX ADMIN — HEPARIN SODIUM 5000 UNITS: 5000 INJECTION INTRAVENOUS; SUBCUTANEOUS at 14:36

## 2024-01-19 RX ADMIN — HYDROCHLOROTHIAZIDE 25 MG: 25 TABLET ORAL at 08:18

## 2024-01-19 RX ADMIN — BUSPIRONE HYDROCHLORIDE 15 MG: 10 TABLET ORAL at 08:18

## 2024-01-19 RX ADMIN — SIMETHICONE 80 MG: 80 TABLET, CHEWABLE ORAL at 12:01

## 2024-01-19 RX ADMIN — HYDROCODONE BITARTRATE AND ACETAMINOPHEN 15 ML: 7.5; 325 SOLUTION ORAL at 14:36

## 2024-01-19 RX ADMIN — ONDANSETRON 4 MG: 2 INJECTION INTRAMUSCULAR; INTRAVENOUS at 08:28

## 2024-01-19 RX ADMIN — HYDROCODONE BITARTRATE AND ACETAMINOPHEN 15 ML: 7.5; 325 SOLUTION ORAL at 08:28

## 2024-01-19 RX ADMIN — HEPARIN SODIUM 5000 UNITS: 5000 INJECTION INTRAVENOUS; SUBCUTANEOUS at 06:26

## 2024-01-19 RX ADMIN — LISINOPRIL 20 MG: 20 TABLET ORAL at 08:19

## 2024-01-19 RX ADMIN — PANTOPRAZOLE SODIUM 40 MG: 40 TABLET, DELAYED RELEASE ORAL at 08:19

## 2024-01-19 RX ADMIN — CLONIDINE HYDROCHLORIDE 0.1 MG: 0.1 TABLET ORAL at 08:18

## 2024-01-19 RX ADMIN — PROPRANOLOL HYDROCHLORIDE 40 MG: 20 TABLET ORAL at 08:19

## 2024-01-19 RX ADMIN — METOCLOPRAMIDE 10 MG: 5 INJECTION, SOLUTION INTRAMUSCULAR; INTRAVENOUS at 10:40

## 2024-01-19 RX ADMIN — CETIRIZINE HYDROCHLORIDE 10 MG: 10 TABLET, FILM COATED ORAL at 08:18

## 2024-01-19 RX ADMIN — SUCRALFATE 1 G: 1 TABLET ORAL at 08:18

## 2024-01-19 RX ADMIN — BUDESONIDE 0.5 MG: 0.5 INHALANT RESPIRATORY (INHALATION) at 10:07

## 2024-01-19 RX ADMIN — ONDANSETRON 4 MG: 2 INJECTION INTRAMUSCULAR; INTRAVENOUS at 14:36

## 2024-01-19 RX ADMIN — SUCRALFATE 1 G: 1 TABLET ORAL at 12:00

## 2024-01-19 NOTE — PROGRESS NOTES
"Patient Name:  Isi Bolton  YOB: 1970  [id number]    Surgery Progress Note    Date of visit: 1/19/2024    Subjective   Subjective: Feeling better this AM. Tolerating PO.         Objective     Objective:     /83 (BP Location: Right arm, Patient Position: Lying)   Pulse 85   Temp 97.6 °F (36.4 °C) (Axillary)   Resp 12   Ht 165.1 cm (65\")   Wt 66.3 kg (146 lb 2.6 oz)   SpO2 95%   BMI 24.32 kg/m²     Intake/Output Summary (Last 24 hours) at 1/19/2024 0719  Last data filed at 1/19/2024 0031  Gross per 24 hour   Intake 1600 ml   Output 300 ml   Net 1300 ml       CV:  Rhythm  regular and rate regular   L:  Clear  to auscultation bilaterally   Abd:  Bowel sounds positive , soft, appropriately tender. Dressings c/d/i  Ext:  No cyanosis, clubbing, edema    Recent labs that are back at this time have been reviewed.            Assessment/ Plan:    Problem List Items Addressed This Visit          Gastrointestinal Abdominal     Gastritis- Doing well after resection of gastric diverticulum. UGI today. Wean O2. Possibly home later today.    Relevant Medications    famotidine (PEPCID) tablet 20 mg (Completed)    pantoprazole (PROTONIX) EC tablet 40 mg (Start on 1/19/2024  7:30 AM)    sucralfate (CARAFATE) tablet 1 g    Other Relevant Orders    Tissue Pathology Exam        Active Hospital Problems    Diagnosis  POA    **Gastric diverticulum [K31.4]  Yes    Anxiety [F41.9]  Yes    Essential hypertension [I10]  Yes      Resolved Hospital Problems   No resolved problems to display.              Vishal Haji MD  1/19/2024  07:19 EST      Tolerating PO. UGI reviewed and shows excellent technical result. Pain controlled. Having BM's. Wants to go home.    D/C home. RTC with me in 4  weeks. No lifting > 30 lbs until RTC. May remove dressings in 24 hours, may shower at that time.  Vishal Haji MD  16:09 EST      "

## 2024-01-19 NOTE — CASE MANAGEMENT/SOCIAL WORK
Case Management Discharge Note      Final Note: No discharge planning. Plan is home with family transporting         Selected Continued Care - Admitted Since 1/18/2024       Destination    No services have been selected for the patient.                Durable Medical Equipment    No services have been selected for the patient.                Dialysis/Infusion    No services have been selected for the patient.                Home Medical Care    No services have been selected for the patient.                Therapy    No services have been selected for the patient.                Community Resources    No services have been selected for the patient.                Community & DME    No services have been selected for the patient.                         Final Discharge Disposition Code: 01 - home or self-care

## 2024-01-19 NOTE — TELEPHONE ENCOUNTER
I sent this prescription for a 21 day supply on 12/26/23 as the discharging hospitalist. There are zero refills from me. Please request refill from patient's primary care physician.

## 2024-01-19 NOTE — PLAN OF CARE
Goal Outcome Evaluation:  Plan of Care Reviewed With: patient        Progress: improving  Outcome Evaluation: VSS on RA. ETCO2 monitoring in place. A&O x4. Lap sites dry and intact with stable dried drainage. Pain controlled with dilaudid PCA. Dilaudid PCA remains unchanged. Pt ambulated to bathroom multiple times this shift with x1 assist. Voiding. Pt stated discomfort and urge to void after urination. Retaining 204 ml post void per BS. I&O cath performed to relieve discomfort. 200 ml out. No additional c/o bladder fullness or discomfort. At the beginning of the shift pt noted to be eating chips and drinking a soda. This RN removed said snack and provided education on dietary changes. Snacks replaced with clear liquids. Tolerating PO. Pt did not sleep.

## 2024-01-29 ENCOUNTER — APPOINTMENT (OUTPATIENT)
Dept: CT IMAGING | Facility: HOSPITAL | Age: 54
End: 2024-01-29
Payer: COMMERCIAL

## 2024-01-29 ENCOUNTER — HOSPITAL ENCOUNTER (OUTPATIENT)
Facility: HOSPITAL | Age: 54
Setting detail: OBSERVATION
Discharge: HOME | End: 2024-02-01
Attending: EMERGENCY MEDICINE | Admitting: FAMILY MEDICINE
Payer: COMMERCIAL

## 2024-01-29 DIAGNOSIS — Z90.49 STATUS POST PARTIAL RESECTION OF COLON: ICD-10-CM

## 2024-01-29 DIAGNOSIS — R11.2 NAUSEA AND VOMITING, UNSPECIFIED VOMITING TYPE: ICD-10-CM

## 2024-01-29 DIAGNOSIS — E83.42 HYPOMAGNESEMIA: ICD-10-CM

## 2024-01-29 DIAGNOSIS — R56.9 NEW ONSET SEIZURE: Primary | ICD-10-CM

## 2024-01-29 DIAGNOSIS — R10.9 NONSPECIFIC ABDOMINAL PAIN: ICD-10-CM

## 2024-01-29 DIAGNOSIS — E87.20 LACTIC ACIDOSIS: ICD-10-CM

## 2024-01-29 DIAGNOSIS — Z87.19 HISTORY OF CROHN'S DISEASE: ICD-10-CM

## 2024-01-29 DIAGNOSIS — E87.1 HYPONATREMIA: ICD-10-CM

## 2024-01-29 LAB
ALBUMIN SERPL-MCNC: 4.2 G/DL (ref 3.5–5.2)
ALBUMIN/GLOB SERPL: 1.4 G/DL
ALP SERPL-CCNC: 83 U/L (ref 39–117)
ALT SERPL W P-5'-P-CCNC: 11 U/L (ref 1–33)
ANION GAP SERPL CALCULATED.3IONS-SCNC: 20 MMOL/L (ref 5–15)
AST SERPL-CCNC: 23 U/L (ref 1–32)
BASOPHILS # BLD AUTO: 0.06 10*3/MM3 (ref 0–0.2)
BASOPHILS NFR BLD AUTO: 0.4 % (ref 0–1.5)
BILIRUB SERPL-MCNC: 0.6 MG/DL (ref 0–1.2)
BILIRUB UR QL STRIP: NEGATIVE
BUN BLDA-MCNC: 7 MG/DL (ref 8–26)
BUN SERPL-MCNC: 7 MG/DL (ref 6–20)
BUN/CREAT SERPL: 6.7 (ref 7–25)
CA-I BLDA-SCNC: 1.05 MMOL/L (ref 1.2–1.32)
CALCIUM SPEC-SCNC: 8.5 MG/DL (ref 8.6–10.5)
CHLORIDE BLDA-SCNC: 92 MMOL/L (ref 98–109)
CHLORIDE SERPL-SCNC: 88 MMOL/L (ref 98–107)
CLARITY UR: CLEAR
CO2 BLDA-SCNC: 20 MMOL/L (ref 24–29)
CO2 SERPL-SCNC: 21 MMOL/L (ref 22–29)
COLOR UR: YELLOW
CREAT BLDA-MCNC: 1 MG/DL (ref 0.6–1.3)
CREAT SERPL-MCNC: 1.05 MG/DL (ref 0.57–1)
D-LACTATE SERPL-SCNC: 3.8 MMOL/L (ref 0.5–2)
DEPRECATED RDW RBC AUTO: 41.7 FL (ref 37–54)
EGFRCR SERPLBLD CKD-EPI 2021: 63.3 ML/MIN/1.73
EGFRCR SERPLBLD CKD-EPI 2021: 67.1 ML/MIN/1.73
EOSINOPHIL # BLD AUTO: 0.18 10*3/MM3 (ref 0–0.4)
EOSINOPHIL NFR BLD AUTO: 1.2 % (ref 0.3–6.2)
ERYTHROCYTE [DISTWIDTH] IN BLOOD BY AUTOMATED COUNT: 11.9 % (ref 12.3–15.4)
GLOBULIN UR ELPH-MCNC: 3.1 GM/DL
GLUCOSE BLDC GLUCOMTR-MCNC: 109 MG/DL (ref 70–130)
GLUCOSE SERPL-MCNC: 118 MG/DL (ref 65–99)
GLUCOSE UR STRIP-MCNC: NEGATIVE MG/DL
HCT VFR BLD AUTO: 33.2 % (ref 34–46.6)
HCT VFR BLDA CALC: 36 % (ref 38–51)
HGB BLD-MCNC: 11.5 G/DL (ref 12–15.9)
HGB BLDA-MCNC: 12.2 G/DL (ref 12–17)
HGB UR QL STRIP.AUTO: NEGATIVE
HOLD SPECIMEN: NORMAL
HOLD SPECIMEN: NORMAL
IMM GRANULOCYTES # BLD AUTO: 0.1 10*3/MM3 (ref 0–0.05)
IMM GRANULOCYTES NFR BLD AUTO: 0.7 % (ref 0–0.5)
KETONES UR QL STRIP: ABNORMAL
LEUKOCYTE ESTERASE UR QL STRIP.AUTO: NEGATIVE
LIPASE SERPL-CCNC: 52 U/L (ref 13–60)
LYMPHOCYTES # BLD AUTO: 3.49 10*3/MM3 (ref 0.7–3.1)
LYMPHOCYTES NFR BLD AUTO: 22.8 % (ref 19.6–45.3)
MCH RBC QN AUTO: 33.7 PG (ref 26.6–33)
MCHC RBC AUTO-ENTMCNC: 34.6 G/DL (ref 31.5–35.7)
MCV RBC AUTO: 97.4 FL (ref 79–97)
MONOCYTES # BLD AUTO: 0.76 10*3/MM3 (ref 0.1–0.9)
MONOCYTES NFR BLD AUTO: 5 % (ref 5–12)
NEUTROPHILS NFR BLD AUTO: 10.75 10*3/MM3 (ref 1.7–7)
NEUTROPHILS NFR BLD AUTO: 69.9 % (ref 42.7–76)
NITRITE UR QL STRIP: NEGATIVE
NRBC BLD AUTO-RTO: 0 /100 WBC (ref 0–0.2)
PH UR STRIP.AUTO: 8.5 [PH] (ref 5–8)
PLATELET # BLD AUTO: 310 10*3/MM3 (ref 140–450)
PMV BLD AUTO: 8.7 FL (ref 6–12)
POTASSIUM BLDA-SCNC: 3.2 MMOL/L (ref 3.5–4.9)
POTASSIUM SERPL-SCNC: 3.3 MMOL/L (ref 3.5–5.2)
PROT SERPL-MCNC: 7.3 G/DL (ref 6–8.5)
PROT UR QL STRIP: NEGATIVE
RBC # BLD AUTO: 3.41 10*6/MM3 (ref 3.77–5.28)
SODIUM BLD-SCNC: 128 MMOL/L (ref 138–146)
SODIUM SERPL-SCNC: 129 MMOL/L (ref 136–145)
SP GR UR STRIP: 1.01 (ref 1–1.03)
UROBILINOGEN UR QL STRIP: ABNORMAL
WBC NRBC COR # BLD AUTO: 15.34 10*3/MM3 (ref 3.4–10.8)
WHOLE BLOOD HOLD COAG: NORMAL
WHOLE BLOOD HOLD SPECIMEN: NORMAL

## 2024-01-29 PROCEDURE — 25010000002 ONDANSETRON PER 1 MG: Performed by: PHYSICIAN ASSISTANT

## 2024-01-29 PROCEDURE — 83930 ASSAY OF BLOOD OSMOLALITY: CPT | Performed by: NURSE PRACTITIONER

## 2024-01-29 PROCEDURE — 85652 RBC SED RATE AUTOMATED: CPT | Performed by: STUDENT IN AN ORGANIZED HEALTH CARE EDUCATION/TRAINING PROGRAM

## 2024-01-29 PROCEDURE — 25510000001 IOPAMIDOL 61 % SOLUTION: Performed by: EMERGENCY MEDICINE

## 2024-01-29 PROCEDURE — 99285 EMERGENCY DEPT VISIT HI MDM: CPT

## 2024-01-29 PROCEDURE — 81003 URINALYSIS AUTO W/O SCOPE: CPT | Performed by: EMERGENCY MEDICINE

## 2024-01-29 PROCEDURE — 80047 BASIC METABLC PNL IONIZED CA: CPT

## 2024-01-29 PROCEDURE — 83690 ASSAY OF LIPASE: CPT | Performed by: EMERGENCY MEDICINE

## 2024-01-29 PROCEDURE — 25810000003 SODIUM CHLORIDE 0.9 % SOLUTION: Performed by: PHYSICIAN ASSISTANT

## 2024-01-29 PROCEDURE — 96375 TX/PRO/DX INJ NEW DRUG ADDON: CPT

## 2024-01-29 PROCEDURE — 83935 ASSAY OF URINE OSMOLALITY: CPT | Performed by: NURSE PRACTITIONER

## 2024-01-29 PROCEDURE — 85014 HEMATOCRIT: CPT

## 2024-01-29 PROCEDURE — 74177 CT ABD & PELVIS W/CONTRAST: CPT

## 2024-01-29 PROCEDURE — 84300 ASSAY OF URINE SODIUM: CPT | Performed by: NURSE PRACTITIONER

## 2024-01-29 PROCEDURE — 25010000002 LORAZEPAM PER 2 MG

## 2024-01-29 PROCEDURE — 70450 CT HEAD/BRAIN W/O DYE: CPT

## 2024-01-29 PROCEDURE — 85025 COMPLETE CBC W/AUTO DIFF WBC: CPT | Performed by: EMERGENCY MEDICINE

## 2024-01-29 PROCEDURE — 83605 ASSAY OF LACTIC ACID: CPT | Performed by: EMERGENCY MEDICINE

## 2024-01-29 PROCEDURE — 80053 COMPREHEN METABOLIC PANEL: CPT | Performed by: EMERGENCY MEDICINE

## 2024-01-29 RX ORDER — LORAZEPAM 2 MG/ML
INJECTION INTRAMUSCULAR
Status: COMPLETED
Start: 2024-01-29 | End: 2024-01-29

## 2024-01-29 RX ORDER — LEVETIRACETAM 500 MG/5ML
1000 INJECTION, SOLUTION, CONCENTRATE INTRAVENOUS ONCE
Status: COMPLETED | OUTPATIENT
Start: 2024-01-29 | End: 2024-01-30

## 2024-01-29 RX ORDER — ONDANSETRON 2 MG/ML
4 INJECTION INTRAMUSCULAR; INTRAVENOUS ONCE
Status: COMPLETED | OUTPATIENT
Start: 2024-01-29 | End: 2024-01-29

## 2024-01-29 RX ORDER — LORAZEPAM 2 MG/ML
2 INJECTION INTRAMUSCULAR ONCE
Status: COMPLETED | OUTPATIENT
Start: 2024-01-29 | End: 2024-01-29

## 2024-01-29 RX ORDER — HYDROMORPHONE HYDROCHLORIDE 1 MG/ML
0.5 INJECTION, SOLUTION INTRAMUSCULAR; INTRAVENOUS; SUBCUTANEOUS ONCE
Status: DISCONTINUED | OUTPATIENT
Start: 2024-01-29 | End: 2024-01-29

## 2024-01-29 RX ORDER — SODIUM CHLORIDE 9 MG/ML
10 INJECTION, SOLUTION INTRAMUSCULAR; INTRAVENOUS; SUBCUTANEOUS AS NEEDED
Status: DISCONTINUED | OUTPATIENT
Start: 2024-01-29 | End: 2024-02-01 | Stop reason: HOSPADM

## 2024-01-29 RX ORDER — SODIUM CHLORIDE 0.9 % (FLUSH) 0.9 %
10 SYRINGE (ML) INJECTION AS NEEDED
Status: DISCONTINUED | OUTPATIENT
Start: 2024-01-29 | End: 2024-02-01 | Stop reason: HOSPADM

## 2024-01-29 RX ORDER — FAMOTIDINE 10 MG/ML
20 INJECTION, SOLUTION INTRAVENOUS ONCE
Status: COMPLETED | OUTPATIENT
Start: 2024-01-29 | End: 2024-01-29

## 2024-01-29 RX ADMIN — ONDANSETRON 4 MG: 2 INJECTION INTRAMUSCULAR; INTRAVENOUS at 20:54

## 2024-01-29 RX ADMIN — SODIUM CHLORIDE 1000 ML: 9 INJECTION, SOLUTION INTRAVENOUS at 20:53

## 2024-01-29 RX ADMIN — LORAZEPAM 2 MG: 2 INJECTION INTRAMUSCULAR; INTRAVENOUS at 20:42

## 2024-01-29 RX ADMIN — FAMOTIDINE 20 MG: 10 INJECTION, SOLUTION INTRAVENOUS at 20:53

## 2024-01-29 RX ADMIN — LORAZEPAM 2 MG: 2 INJECTION INTRAMUSCULAR at 20:42

## 2024-01-29 RX ADMIN — IOPAMIDOL 85 ML: 612 INJECTION, SOLUTION INTRAVENOUS at 23:13

## 2024-01-29 RX ADMIN — SODIUM CHLORIDE 1000 ML: 9 INJECTION, SOLUTION INTRAVENOUS at 22:51

## 2024-01-29 NOTE — Clinical Note
Level of Care: Telemetry [5]   Diagnosis: Seizure [205090]   Admitting Physician: ABBI STEWART [[id number]]   Attending Physician: ABBI STEWART [[id number]]

## 2024-01-29 NOTE — LETTER
February 1, 2024         To Whom it May Concern:    Please excuse Brian Spauldingjeimy from school on 1/31/24.  Please call with any questions/concerns.          Sincerely,

## 2024-01-30 ENCOUNTER — APPOINTMENT (OUTPATIENT)
Dept: MRI IMAGING | Facility: HOSPITAL | Age: 54
End: 2024-01-30
Payer: COMMERCIAL

## 2024-01-30 ENCOUNTER — APPOINTMENT (OUTPATIENT)
Dept: NEUROLOGY | Facility: HOSPITAL | Age: 54
End: 2024-01-30
Payer: COMMERCIAL

## 2024-01-30 PROBLEM — F10.10 ETOH ABUSE: Status: ACTIVE | Noted: 2024-01-30

## 2024-01-30 PROBLEM — R56.9 SEIZURE: Status: ACTIVE | Noted: 2024-01-30

## 2024-01-30 PROBLEM — E87.6 HYPOKALEMIA: Status: ACTIVE | Noted: 2024-01-30

## 2024-01-30 PROBLEM — E87.1 HYPONATREMIA: Status: ACTIVE | Noted: 2024-01-30

## 2024-01-30 PROBLEM — E87.20 LACTIC ACIDOSIS: Status: ACTIVE | Noted: 2024-01-30

## 2024-01-30 LAB
ADV 40+41 DNA STL QL NAA+NON-PROBE: NOT DETECTED
ANION GAP SERPL CALCULATED.3IONS-SCNC: 16 MMOL/L (ref 5–15)
ASTRO TYP 1-8 RNA STL QL NAA+NON-PROBE: NOT DETECTED
BASOPHILS # BLD AUTO: 0.03 10*3/MM3 (ref 0–0.2)
BASOPHILS NFR BLD AUTO: 0.3 % (ref 0–1.5)
BUN SERPL-MCNC: 6 MG/DL (ref 6–20)
BUN/CREAT SERPL: 6.7 (ref 7–25)
C CAYETANENSIS DNA STL QL NAA+NON-PROBE: NOT DETECTED
C COLI+JEJ+UPSA DNA STL QL NAA+NON-PROBE: NOT DETECTED
C DIFF GDH + TOXINS A+B STL QL IA.RAPID: NEGATIVE
C DIFF TOX GENS STL QL NAA+PROBE: DETECTED
CA-I SERPL ISE-MCNC: 1.09 MMOL/L (ref 1.12–1.32)
CALCIUM SPEC-SCNC: 7.7 MG/DL (ref 8.6–10.5)
CHLORIDE SERPL-SCNC: 99 MMOL/L (ref 98–107)
CO2 SERPL-SCNC: 20 MMOL/L (ref 22–29)
CREAT SERPL-MCNC: 0.89 MG/DL (ref 0.57–1)
CRP SERPL-MCNC: <0.3 MG/DL (ref 0–0.5)
CRYPTOSP DNA STL QL NAA+NON-PROBE: NOT DETECTED
D-LACTATE SERPL-SCNC: 0.9 MMOL/L (ref 0.5–2)
DEPRECATED RDW RBC AUTO: 43.9 FL (ref 37–54)
E HISTOLYT DNA STL QL NAA+NON-PROBE: NOT DETECTED
EAEC PAA PLAS AGGR+AATA ST NAA+NON-PRB: NOT DETECTED
EC STX1+STX2 GENES STL QL NAA+NON-PROBE: NOT DETECTED
EGFRCR SERPLBLD CKD-EPI 2021: 77.2 ML/MIN/1.73
EOSINOPHIL # BLD AUTO: 0.07 10*3/MM3 (ref 0–0.4)
EOSINOPHIL NFR BLD AUTO: 0.6 % (ref 0.3–6.2)
EPEC EAE GENE STL QL NAA+NON-PROBE: NOT DETECTED
ERYTHROCYTE [DISTWIDTH] IN BLOOD BY AUTOMATED COUNT: 12.1 % (ref 12.3–15.4)
ERYTHROCYTE [SEDIMENTATION RATE] IN BLOOD: 18 MM/HR (ref 0–30)
ETEC LTA+ST1A+ST1B TOX ST NAA+NON-PROBE: NOT DETECTED
ETHANOL BLD-MCNC: <10 MG/DL (ref 0–10)
FOLATE SERPL-MCNC: 15 NG/ML (ref 4.78–24.2)
G LAMBLIA DNA STL QL NAA+NON-PROBE: NOT DETECTED
GLUCOSE SERPL-MCNC: 106 MG/DL (ref 65–99)
HCT VFR BLD AUTO: 29.4 % (ref 34–46.6)
HGB BLD-MCNC: 10.1 G/DL (ref 12–15.9)
HOLD SPECIMEN: NORMAL
IMM GRANULOCYTES # BLD AUTO: 0.05 10*3/MM3 (ref 0–0.05)
IMM GRANULOCYTES NFR BLD AUTO: 0.5 % (ref 0–0.5)
LYMPHOCYTES # BLD AUTO: 1.76 10*3/MM3 (ref 0.7–3.1)
LYMPHOCYTES NFR BLD AUTO: 15.9 % (ref 19.6–45.3)
MAGNESIUM SERPL-MCNC: 1 MG/DL (ref 1.6–2.6)
MAGNESIUM SERPL-MCNC: 2 MG/DL (ref 1.6–2.6)
MAGNESIUM SERPL-MCNC: 2.8 MG/DL (ref 1.6–2.6)
MCH RBC QN AUTO: 34.2 PG (ref 26.6–33)
MCHC RBC AUTO-ENTMCNC: 34.4 G/DL (ref 31.5–35.7)
MCV RBC AUTO: 99.7 FL (ref 79–97)
MONOCYTES # BLD AUTO: 0.59 10*3/MM3 (ref 0.1–0.9)
MONOCYTES NFR BLD AUTO: 5.3 % (ref 5–12)
NEUTROPHILS NFR BLD AUTO: 77.4 % (ref 42.7–76)
NEUTROPHILS NFR BLD AUTO: 8.59 10*3/MM3 (ref 1.7–7)
NOROVIRUS GI+II RNA STL QL NAA+NON-PROBE: NOT DETECTED
NRBC BLD AUTO-RTO: 0 /100 WBC (ref 0–0.2)
OSMOLALITY SERPL: 273 MOSM/KG (ref 275–295)
OSMOLALITY UR: 203 MOSM/KG (ref 300–1100)
P SHIGELLOIDES DNA STL QL NAA+NON-PROBE: NOT DETECTED
PLATELET # BLD AUTO: 257 10*3/MM3 (ref 140–450)
PMV BLD AUTO: 8.7 FL (ref 6–12)
POTASSIUM SERPL-SCNC: 4.1 MMOL/L (ref 3.5–5.2)
QT INTERVAL: 408 MS
QTC INTERVAL: 510 MS
RBC # BLD AUTO: 2.95 10*6/MM3 (ref 3.77–5.28)
RVA RNA STL QL NAA+NON-PROBE: NOT DETECTED
S ENT+BONG DNA STL QL NAA+NON-PROBE: NOT DETECTED
SAPO I+II+IV+V RNA STL QL NAA+NON-PROBE: NOT DETECTED
SHIGELLA SP+EIEC IPAH ST NAA+NON-PROBE: NOT DETECTED
SODIUM SERPL-SCNC: 130 MMOL/L (ref 136–145)
SODIUM SERPL-SCNC: 135 MMOL/L (ref 136–145)
SODIUM UR-SCNC: 58 MMOL/L
V CHOL+PARA+VUL DNA STL QL NAA+NON-PROBE: NOT DETECTED
V CHOLERAE DNA STL QL NAA+NON-PROBE: NOT DETECTED
VIT B12 BLD-MCNC: >2000 PG/ML (ref 211–946)
WBC NRBC COR # BLD AUTO: 11.09 10*3/MM3 (ref 3.4–10.8)
Y ENTEROCOL DNA STL QL NAA+NON-PROBE: NOT DETECTED

## 2024-01-30 PROCEDURE — 82330 ASSAY OF CALCIUM: CPT

## 2024-01-30 PROCEDURE — 87449 NOS EACH ORGANISM AG IA: CPT | Performed by: STUDENT IN AN ORGANIZED HEALTH CARE EDUCATION/TRAINING PROGRAM

## 2024-01-30 PROCEDURE — 82077 ASSAY SPEC XCP UR&BREATH IA: CPT | Performed by: PHYSICIAN ASSISTANT

## 2024-01-30 PROCEDURE — G0378 HOSPITAL OBSERVATION PER HR: HCPCS

## 2024-01-30 PROCEDURE — 96368 THER/DIAG CONCURRENT INF: CPT

## 2024-01-30 PROCEDURE — 96376 TX/PRO/DX INJ SAME DRUG ADON: CPT

## 2024-01-30 PROCEDURE — 25010000002 LEVETRIRACETAM PER 10 MG: Performed by: PHYSICIAN ASSISTANT

## 2024-01-30 PROCEDURE — 94640 AIRWAY INHALATION TREATMENT: CPT

## 2024-01-30 PROCEDURE — 99222 1ST HOSP IP/OBS MODERATE 55: CPT | Performed by: STUDENT IN AN ORGANIZED HEALTH CARE EDUCATION/TRAINING PROGRAM

## 2024-01-30 PROCEDURE — 87493 C DIFF AMPLIFIED PROBE: CPT | Performed by: STUDENT IN AN ORGANIZED HEALTH CARE EDUCATION/TRAINING PROGRAM

## 2024-01-30 PROCEDURE — 93010 ELECTROCARDIOGRAM REPORT: CPT | Performed by: INTERNAL MEDICINE

## 2024-01-30 PROCEDURE — 97161 PT EVAL LOW COMPLEX 20 MIN: CPT

## 2024-01-30 PROCEDURE — 80048 BASIC METABOLIC PNL TOTAL CA: CPT | Performed by: NURSE PRACTITIONER

## 2024-01-30 PROCEDURE — 70551 MRI BRAIN STEM W/O DYE: CPT

## 2024-01-30 PROCEDURE — 25010000002 MAGNESIUM SULFATE 4 GM/100ML SOLUTION: Performed by: EMERGENCY MEDICINE

## 2024-01-30 PROCEDURE — 96365 THER/PROPH/DIAG IV INF INIT: CPT

## 2024-01-30 PROCEDURE — 25010000002 MAGNESIUM SULFATE 2 GM/50ML SOLUTION: Performed by: EMERGENCY MEDICINE

## 2024-01-30 PROCEDURE — 96375 TX/PRO/DX INJ NEW DRUG ADDON: CPT

## 2024-01-30 PROCEDURE — 25810000003 SODIUM CHLORIDE 0.9 % SOLUTION: Performed by: STUDENT IN AN ORGANIZED HEALTH CARE EDUCATION/TRAINING PROGRAM

## 2024-01-30 PROCEDURE — 63710000001 ONDANSETRON ODT 4 MG TABLET DISPERSIBLE: Performed by: NURSE PRACTITIONER

## 2024-01-30 PROCEDURE — 94799 UNLISTED PULMONARY SVC/PX: CPT

## 2024-01-30 PROCEDURE — 25010000002 POTASSIUM CHLORIDE 10 MEQ/100ML SOLUTION: Performed by: EMERGENCY MEDICINE

## 2024-01-30 PROCEDURE — 82746 ASSAY OF FOLIC ACID SERUM: CPT

## 2024-01-30 PROCEDURE — 83735 ASSAY OF MAGNESIUM: CPT | Performed by: NURSE PRACTITIONER

## 2024-01-30 PROCEDURE — 83605 ASSAY OF LACTIC ACID: CPT | Performed by: EMERGENCY MEDICINE

## 2024-01-30 PROCEDURE — 99204 OFFICE O/P NEW MOD 45 MIN: CPT

## 2024-01-30 PROCEDURE — 83735 ASSAY OF MAGNESIUM: CPT | Performed by: PHYSICIAN ASSISTANT

## 2024-01-30 PROCEDURE — 95816 EEG AWAKE AND DROWSY: CPT | Performed by: PSYCHIATRY & NEUROLOGY

## 2024-01-30 PROCEDURE — 36415 COLL VENOUS BLD VENIPUNCTURE: CPT

## 2024-01-30 PROCEDURE — 86140 C-REACTIVE PROTEIN: CPT | Performed by: STUDENT IN AN ORGANIZED HEALTH CARE EDUCATION/TRAINING PROGRAM

## 2024-01-30 PROCEDURE — 96366 THER/PROPH/DIAG IV INF ADDON: CPT

## 2024-01-30 PROCEDURE — 95816 EEG AWAKE AND DROWSY: CPT

## 2024-01-30 PROCEDURE — 82607 VITAMIN B-12: CPT

## 2024-01-30 PROCEDURE — 85025 COMPLETE CBC W/AUTO DIFF WBC: CPT | Performed by: NURSE PRACTITIONER

## 2024-01-30 PROCEDURE — 93005 ELECTROCARDIOGRAM TRACING: CPT | Performed by: NURSE PRACTITIONER

## 2024-01-30 PROCEDURE — 84295 ASSAY OF SERUM SODIUM: CPT | Performed by: STUDENT IN AN ORGANIZED HEALTH CARE EDUCATION/TRAINING PROGRAM

## 2024-01-30 PROCEDURE — 25010000002 ONDANSETRON PER 1 MG: Performed by: NURSE PRACTITIONER

## 2024-01-30 PROCEDURE — 87507 IADNA-DNA/RNA PROBE TQ 12-25: CPT | Performed by: FAMILY MEDICINE

## 2024-01-30 RX ORDER — CLONIDINE HYDROCHLORIDE 0.1 MG/1
0.1 TABLET ORAL 2 TIMES DAILY
Status: DISCONTINUED | OUTPATIENT
Start: 2024-01-30 | End: 2024-02-01 | Stop reason: HOSPADM

## 2024-01-30 RX ORDER — MAGNESIUM SULFATE HEPTAHYDRATE 40 MG/ML
4 INJECTION, SOLUTION INTRAVENOUS EVERY 4 HOURS
Status: DISPENSED | OUTPATIENT
Start: 2024-01-30 | End: 2024-01-30

## 2024-01-30 RX ORDER — ONDANSETRON 4 MG/1
4 TABLET, ORALLY DISINTEGRATING ORAL EVERY 6 HOURS PRN
Status: DISCONTINUED | OUTPATIENT
Start: 2024-01-30 | End: 2024-02-01 | Stop reason: HOSPADM

## 2024-01-30 RX ORDER — LISINOPRIL 10 MG/1
20 TABLET ORAL EVERY 12 HOURS SCHEDULED
Status: CANCELLED | OUTPATIENT
Start: 2024-01-30

## 2024-01-30 RX ORDER — ACETAMINOPHEN 325 MG/1
650 TABLET ORAL EVERY 4 HOURS PRN
Status: DISCONTINUED | OUTPATIENT
Start: 2024-01-30 | End: 2024-02-01 | Stop reason: HOSPADM

## 2024-01-30 RX ORDER — CHOLESTYRAMINE LIGHT 4 G/5.7G
1 POWDER, FOR SUSPENSION ORAL DAILY
Status: DISCONTINUED | OUTPATIENT
Start: 2024-01-30 | End: 2024-02-01 | Stop reason: HOSPADM

## 2024-01-30 RX ORDER — SODIUM CHLORIDE 9 MG/ML
40 INJECTION, SOLUTION INTRAVENOUS AS NEEDED
Status: DISCONTINUED | OUTPATIENT
Start: 2024-01-30 | End: 2024-02-01 | Stop reason: HOSPADM

## 2024-01-30 RX ORDER — BUDESONIDE 0.5 MG/2ML
0.5 INHALANT ORAL
Status: DISCONTINUED | OUTPATIENT
Start: 2024-01-30 | End: 2024-02-01 | Stop reason: HOSPADM

## 2024-01-30 RX ORDER — ALPRAZOLAM 1 MG/1
1 TABLET ORAL 2 TIMES DAILY PRN
Status: DISCONTINUED | OUTPATIENT
Start: 2024-01-30 | End: 2024-02-01 | Stop reason: HOSPADM

## 2024-01-30 RX ORDER — ONDANSETRON 2 MG/ML
4 INJECTION INTRAMUSCULAR; INTRAVENOUS EVERY 6 HOURS PRN
Status: DISCONTINUED | OUTPATIENT
Start: 2024-01-30 | End: 2024-02-01 | Stop reason: HOSPADM

## 2024-01-30 RX ORDER — SODIUM CHLORIDE 0.9 % (FLUSH) 0.9 %
10 SYRINGE (ML) INJECTION EVERY 12 HOURS SCHEDULED
Status: DISCONTINUED | OUTPATIENT
Start: 2024-01-30 | End: 2024-02-01 | Stop reason: HOSPADM

## 2024-01-30 RX ORDER — CETIRIZINE HYDROCHLORIDE 10 MG/1
10 TABLET ORAL DAILY
Status: DISCONTINUED | OUTPATIENT
Start: 2024-01-30 | End: 2024-02-01 | Stop reason: HOSPADM

## 2024-01-30 RX ORDER — NITROGLYCERIN 0.4 MG/1
0.4 TABLET SUBLINGUAL
Status: DISCONTINUED | OUTPATIENT
Start: 2024-01-30 | End: 2024-02-01 | Stop reason: HOSPADM

## 2024-01-30 RX ORDER — SODIUM CHLORIDE 9 MG/ML
75 INJECTION, SOLUTION INTRAVENOUS CONTINUOUS
Status: DISCONTINUED | OUTPATIENT
Start: 2024-01-30 | End: 2024-01-30 | Stop reason: ALTCHOICE

## 2024-01-30 RX ORDER — SODIUM CHLORIDE 0.9 % (FLUSH) 0.9 %
10 SYRINGE (ML) INJECTION AS NEEDED
Status: DISCONTINUED | OUTPATIENT
Start: 2024-01-30 | End: 2024-02-01 | Stop reason: HOSPADM

## 2024-01-30 RX ORDER — POTASSIUM CHLORIDE 7.45 MG/ML
10 INJECTION INTRAVENOUS
Status: COMPLETED | OUTPATIENT
Start: 2024-01-30 | End: 2024-01-30

## 2024-01-30 RX ORDER — ALBUTEROL SULFATE 2.5 MG/3ML
2.5 SOLUTION RESPIRATORY (INHALATION) EVERY 4 HOURS PRN
Status: DISCONTINUED | OUTPATIENT
Start: 2024-01-30 | End: 2024-02-01 | Stop reason: HOSPADM

## 2024-01-30 RX ORDER — PROPRANOLOL HYDROCHLORIDE 20 MG/1
40 TABLET ORAL EVERY 12 HOURS SCHEDULED
Status: DISCONTINUED | OUTPATIENT
Start: 2024-01-30 | End: 2024-02-01 | Stop reason: HOSPADM

## 2024-01-30 RX ORDER — MAGNESIUM SULFATE HEPTAHYDRATE 40 MG/ML
2 INJECTION, SOLUTION INTRAVENOUS
Status: COMPLETED | OUTPATIENT
Start: 2024-01-30 | End: 2024-01-30

## 2024-01-30 RX ORDER — MONTELUKAST SODIUM 10 MG/1
10 TABLET ORAL NIGHTLY
Status: DISCONTINUED | OUTPATIENT
Start: 2024-01-30 | End: 2024-02-01 | Stop reason: HOSPADM

## 2024-01-30 RX ORDER — ACETAMINOPHEN 650 MG/1
650 SUPPOSITORY RECTAL EVERY 4 HOURS PRN
Status: DISCONTINUED | OUTPATIENT
Start: 2024-01-30 | End: 2024-02-01 | Stop reason: HOSPADM

## 2024-01-30 RX ORDER — HYDROCHLOROTHIAZIDE 25 MG/1
25 TABLET ORAL DAILY
Status: CANCELLED | OUTPATIENT
Start: 2024-01-30

## 2024-01-30 RX ORDER — ACETAMINOPHEN 160 MG/5ML
650 SOLUTION ORAL EVERY 4 HOURS PRN
Status: DISCONTINUED | OUTPATIENT
Start: 2024-01-30 | End: 2024-02-01 | Stop reason: HOSPADM

## 2024-01-30 RX ORDER — SUCRALFATE 1 G/1
1 TABLET ORAL
Status: DISCONTINUED | OUTPATIENT
Start: 2024-01-30 | End: 2024-02-01 | Stop reason: HOSPADM

## 2024-01-30 RX ORDER — SODIUM CHLORIDE 9 MG/ML
50 INJECTION, SOLUTION INTRAVENOUS CONTINUOUS
Status: DISCONTINUED | OUTPATIENT
Start: 2024-01-30 | End: 2024-01-31

## 2024-01-30 RX ADMIN — POTASSIUM CHLORIDE 10 MEQ: 7.46 INJECTION, SOLUTION INTRAVENOUS at 04:14

## 2024-01-30 RX ADMIN — Medication 10 ML: at 09:56

## 2024-01-30 RX ADMIN — POTASSIUM CHLORIDE 10 MEQ: 7.46 INJECTION, SOLUTION INTRAVENOUS at 05:34

## 2024-01-30 RX ADMIN — MAGNESIUM SULFATE HEPTAHYDRATE 2 G: 2 INJECTION, SOLUTION INTRAVENOUS at 02:15

## 2024-01-30 RX ADMIN — ONDANSETRON 4 MG: 2 INJECTION INTRAMUSCULAR; INTRAVENOUS at 06:45

## 2024-01-30 RX ADMIN — CLONIDINE HYDROCHLORIDE 0.1 MG: 0.1 TABLET ORAL at 09:57

## 2024-01-30 RX ADMIN — SUCRALFATE 1 G: 1 TABLET ORAL at 16:48

## 2024-01-30 RX ADMIN — ALPRAZOLAM 1 MG: 1 TABLET ORAL at 21:09

## 2024-01-30 RX ADMIN — LEVETIRACETAM 1000 MG: 100 INJECTION, SOLUTION INTRAVENOUS at 00:07

## 2024-01-30 RX ADMIN — POTASSIUM CHLORIDE 10 MEQ: 7.46 INJECTION, SOLUTION INTRAVENOUS at 02:15

## 2024-01-30 RX ADMIN — PROPRANOLOL HYDROCHLORIDE 40 MG: 20 TABLET ORAL at 21:08

## 2024-01-30 RX ADMIN — MONTELUKAST 10 MG: 10 TABLET, FILM COATED ORAL at 21:07

## 2024-01-30 RX ADMIN — POTASSIUM CHLORIDE 10 MEQ: 7.46 INJECTION, SOLUTION INTRAVENOUS at 01:08

## 2024-01-30 RX ADMIN — SODIUM CHLORIDE 50 ML/HR: 9 INJECTION, SOLUTION INTRAVENOUS at 04:17

## 2024-01-30 RX ADMIN — SUCRALFATE 1 G: 1 TABLET ORAL at 12:18

## 2024-01-30 RX ADMIN — ONDANSETRON 4 MG: 4 TABLET, ORALLY DISINTEGRATING ORAL at 21:15

## 2024-01-30 RX ADMIN — MAGNESIUM SULFATE 4 G: 4 INJECTION INTRAVENOUS at 02:51

## 2024-01-30 RX ADMIN — SUCRALFATE 1 G: 1 TABLET ORAL at 07:18

## 2024-01-30 RX ADMIN — SUCRALFATE 1 G: 1 TABLET ORAL at 21:08

## 2024-01-30 RX ADMIN — BUSPIRONE HYDROCHLORIDE 15 MG: 10 TABLET ORAL at 21:07

## 2024-01-30 RX ADMIN — CLONIDINE HYDROCHLORIDE 0.1 MG: 0.1 TABLET ORAL at 21:07

## 2024-01-30 RX ADMIN — BUDESONIDE INHALATION 0.5 MG: 0.5 SUSPENSION RESPIRATORY (INHALATION) at 19:19

## 2024-01-30 NOTE — PLAN OF CARE
Goal Outcome Evaluation:  Plan of Care Reviewed With: patient        Progress: no change  Outcome Evaluation: PT evaluation completed. Pt is independent with transfers and gait, demo good safety awareness and no LOB. No skilled needs identified. Recommend home upon DC.      Anticipated Discharge Disposition (PT): home

## 2024-01-30 NOTE — CONSULTS
Patient Name:  Isi Bolton  YOB: 1970  [id number]       Patient Care Team:  Chloe Barksdale MD as PCP - General  Chloe Barksdale MD as PCP - Family Medicine      General Surgery Consult Note     Date of Consultation: 01/30/24    Consulting Physician : Lydia Keenan DO    Reason for Consult : Seizure, recent operation    Subjective     I have been asked to see  Isi Bolton , a 53 y.o. female in consultation for seizure, recent operation.  Ms. Bolton is well-known to me.  She has a known past medical history of Crohn's disease, postoperative nausea and vomiting, hypertension, anxiety and reflux disease. She underwent a laparoscopic resection of a gastric diverticulum and Hill fundoplication on 1/18/2024.  This was for chronic symptoms of nausea vomiting and GI upset, and she has had multiple admissions for the symptoms in the past.  The gastric diverticulum was an incidental finding, and felt that it may be contributing to some of the symptoms.  So that that was the rationale for operation.  Pathology from that resection showed benign stomach without H. pylori or other infection.  She states that after her operation, she did well, and was discharged home on 1/19/2024.  After discharge she continued to improve, and advance her diet.  A couple of days ago she started having some diarrhea, which was associated with nausea and vomiting.  She began to feel weaker and presented to the emergency department last evening with these complaints.  She had a witnessed seizure in the ER and is subsequently recovered.  She was admitted to the hospitalist service and we have been asked to see her for possible surgical management.  She denies any real abdominal pain, has been tolerating diet well and having normal bowel function and to hold her diarrhea began.  She does report 1 episode of some blood in her bowel movements.  She is current relaxing in bed.      Allergy:   Allergies   Allergen  Reactions    Metoprolol Other (See Comments)     Pt reports no longer taking; reports that it caused heart rate to increase into 170's; and elevated SBP >200    Pt reports no longer taking; reports that it caused heart rate to increase into 170's; and elevated SBP >200       Medications:  budesonide, 0.5 mg, Nebulization, BID - RT  busPIRone, 15 mg, Oral, Q12H  cetirizine, 10 mg, Oral, Daily  cholestyramine light, 1 packet, Oral, Daily  cloNIDine, 0.1 mg, Oral, BID  magnesium sulfate, 4 g, Intravenous, Q4H  montelukast, 10 mg, Oral, Nightly  propranolol, 40 mg, Oral, Q12H  sodium chloride, 10 mL, Intravenous, Q12H  sucralfate, 1 g, Oral, 4x Daily AC & at Bedtime  Vortioxetine HBr, 20 mg, Oral, Daily      sodium chloride, 50 mL/hr, Last Rate: 50 mL/hr (01/30/24 2994)      No current facility-administered medications on file prior to encounter.     Current Outpatient Medications on File Prior to Encounter   Medication Sig    albuterol sulfate  (90 Base) MCG/ACT inhaler Inhale 2 puffs by mouth Every 4 (Four) Hours As Needed for Wheezing.    ALPRAZolam (XANAX) 1 MG tablet Take 1 tablet (1 mg total) by mouth 2 (two) times a day if needed for anxiety.    busPIRone (BUSPAR) 15 MG tablet Take 1 tablet (15 mg total) by mouth in the morning and 1 tablet (15 mg total) before bedtime.    cetirizine (zyrTEC) 10 MG tablet Take 1 tablet by mouth Daily.    cloNIDine (CATAPRES) 0.1 MG tablet Take 1 tablet by mouth 2 (Two) Times a Day.    colestipol (COLESTID) 1 g tablet Take 1 tablet by mouth 2 times daily.    docusate sodium (COLACE) 50 mg/5 mL liquid Take 10 mL by mouth Daily.    fluticasone (FLOVENT HFA) 110 MCG/ACT inhaler Inhale 1 puff by mouth 2 (Two) Times a Day.    hydroCHLOROthiazide (HYDRODIURIL) 25 MG tablet Take 1 tablet by mouth Daily.    HYDROcodone-acetaminophen (HYCET) 7.5-325 MG/15ML solution Take 15 mL by mouth Every 4 (Four) Hours As Needed for Moderate Pain.    hydrOXYzine pamoate (VISTARIL) 25 MG capsule  Take 1 capsule (25 mg total) by mouth 3 (three) times a day if needed for itching.    lisinopril (PRINIVIL,ZESTRIL) 20 MG tablet Take 1 tablet by mouth in the morning, and take 1 tablet in the evening.    montelukast (SINGULAIR) 10 MG tablet Take 1 tablet (10 mg total) by mouth every night.    naloxone (NARCAN) 4 MG/0.1ML nasal spray Call 911. Don't prime. [place] in 1 nostril for overdose. Repeat in 2-3 minutes in other nostril if no or minimal breathing/responsiveness.    ondansetron ODT (ZOFRAN-ODT) 4 MG disintegrating tablet Place 1 tablet on the tongue Every 6 (Six) Hours As Needed for Nausea or Vomiting.    promethazine (PHENERGAN) 6.25 MG/5ML syrup Take 10 mL by mouth Every 6 (Six) Hours As Needed for Nausea or Vomiting.    propranolol (INDERAL) 40 MG tablet Take 1 tablet by mouth twice a day.    sucralfate (CARAFATE) 1 g tablet Take 1 tablet by mouth 4 (Four) Times a Day Before Meals & at Bedtime.    Vortioxetine HBr (Trintellix) 20 MG tablet Take 1 tablet (20 mg total) by mouth 1 time each day.       PMHx:   Past Medical History:   Diagnosis Date    Angina pectoris     Asthma     Cancer     cervical    Crohn disease     Deaf, right     Heart disease     Hypertension     PONV (postoperative nausea and vomiting)    -Anxiety and depression  -History of tobacco abuse, resolved    Past Surgical History:  Past Surgical History:   Procedure Laterality Date    CHOLECYSTECTOMY      ENDOSCOPY N/A 12/26/2023    Procedure: ESOPHAGOGASTRODUODENOSCOPY;  Surgeon: Eh Pascal MD;  Location: [facility] ENDOSCOPY;  Service: Gastroenterology;  Laterality: N/A;    NISSEN FUNDOPLICATION N/A 1/18/2024    Procedure: LAPAROSCOPIC RESECTION GASTRIC DIVERTICULUM, AND HILL FUNDOPLICATIO;  Surgeon: Vishal Haji MD;  Location: [facility] OR;  Service: General;  Laterality: N/A;    TUBAL ABDOMINAL LIGATION           Family History: Noncontributory     Social History: Pt lives in [place].    Tobacco use: Denies     EtOH use : Denies  "   Illicit drug use: Denies      Review of Systems        Constitutional: No fevers, chills or malaise   Eyes: Denies visual changes    Cardiovascular: Denies chest pain, palpitations   Pulmonary: Denies cough or shortness of breath   Abdominal/ GI: See HPI    Genitourinary: Denies dysuria or hematuria   Musculoskeletal: Denies any but chronic joint aches, pains or deformities   Psychiatric: No recent mood changes   Neurologic: No paresthesias or loss of function          Objective     Physical Exam:      Vital Signs  /93   Pulse 94   Temp 98.5 °F (36.9 °C) (Oral)   Resp 16   Ht 165.1 cm (65\")   Wt 65.3 kg (144 lb)   SpO2 95%   BMI 23.96 kg/m²     Intake/Output Summary (Last 24 hours) at 1/30/2024 0722  Last data filed at 1/30/2024 0103  Gross per 24 hour   Intake 2000 ml   Output --   Net 2000 ml         Physical Exam:    Head: Normocephalic, atraumatic.   Eyes: Pupils equal, round, react to light and accommodation.   Mouth: Oral mucosa without lesions,   Neck: No masses, lymphadenopathy or carotid bruits bilaterally   CV: Rhythm  and rate regular , no  murmurs, rubs or gallops  Lungs: Clear  to auscultation bilaterally   Abdomen: Bowel sounds positive  , soft, nontender. Incisions c/d/i  Groin : No obvious hernias bilaterally   Extremities:  No cyanosis, clubbing or edema bilaterally   Lymphatics: No abnormal lymphadenopathy appreciated   Neurologic: No gross deficits       Results Review: I have personally reviewed all of the recent lab and imaging results available at this time.  Laboratory exam on arrival revealed a sodium of 129 with a potassium of 3.3 and a chloride of 88.  Her creatinine was 1.05.  Calcium is low at 8.5.  Lactate on arrival was 3.8 but has since resolved with fluid resuscitation.  Lipase was 52.  White count on arrival was 15,000 with hemoglobin of 11.5 and a platelet count of 310.  There was no left shift.    CT scan of the abdomen pelvis was personally viewed by me as well as " the final dictated noted report.  This demonstrates no evidence of acute surgical complication.  There is no evidence of obstruction.  There are expected surgical changes at the diaphragm.  The gallbladder is surgically absent.  There is no evidence of bowel obstruction.  The appendix is visualized and is normal.  There are no abdominal wall hernias of note.           Assessment and Plan:    Problem List Items Addressed This Visit          Genitourinary and Reproductive     Hypomagnesemia    Lactic acidosis    Hyponatremia     Other Visit Diagnoses       New onset seizure    -  Primary- I suspect that she had a Crohn's flare producing diarrhea and electrolyte abnormalities, which subsequently resulted in a seizure.  The GI symptoms are chronic, and precede even her operative intervention, which itself was performed primarily to see if this diverticulum was the cause of these chronic GI issues.  Obviously, it was not.  Agree with testing to make sure she does not have an infectious colitis or other infectious cause of diarrhea, continue with ongoing fluid resuscitation and neurology consult.  There is no evidence of surgical complication, but I will continue to follow this patient with you.    Nonspecific abdominal pain        Nausea and vomiting, unspecified vomiting type        Status post partial resection of colon        History of Crohn's disease                 Active Hospital Problems    Diagnosis  POA    **Seizure [R56.9]  Yes    Lactic acidosis [E87.20]  Unknown    Hypokalemia [E87.6]  Unknown    Hyponatremia [E87.1]  Unknown    ETOH abuse [F10.10]  Unknown    Hypomagnesemia [E83.42]  Yes    Inflammatory bowel disease (Crohn's disease) [K50.90]  Yes    Essential hypertension [I10]  Yes      Resolved Hospital Problems   No resolved problems to display.            I discussed the patient's findings and my recommendations with the patient and/or family, as well as the primary team     Vishal Haji,  MD  01/30/24  07:22 EST        Dictated using Dragon Dictation

## 2024-01-30 NOTE — ED NOTES
Isi Bolton    Nursing Report ED to Floor:  Mental status: A&Ox4  Ambulatory status: Independent  Oxygen Therapy:  2L NC  Cardiac Rhythm: NSR  Admitted from: ED  Safety Concerns:  None  Social Issues: None  ED Room #:  15    ED Nurse Phone Extension - [phone] or may call [phone].      HPI:   Chief Complaint   Patient presents with    Abdominal Pain    Diarrhea       Past Medical History:  Past Medical History:   Diagnosis Date    Angina pectoris     Asthma     Cancer     cervical    Crohn disease     Deaf, right     Heart disease     Hypertension     PONV (postoperative nausea and vomiting)         Past Surgical History:  Past Surgical History:   Procedure Laterality Date    CHOLECYSTECTOMY      ENDOSCOPY N/A 12/26/2023    Procedure: ESOPHAGOGASTRODUODENOSCOPY;  Surgeon: Eh Pascal MD;  Location: [facility] ENDOSCOPY;  Service: Gastroenterology;  Laterality: N/A;    NISSEN FUNDOPLICATION N/A 1/18/2024    Procedure: LAPAROSCOPIC RESECTION GASTRIC DIVERTICULUM, AND HILL FUNDOPLICATIO;  Surgeon: Vishal Haji MD;  Location: [facility] OR;  Service: General;  Laterality: N/A;    TUBAL ABDOMINAL LIGATION          Admitting Doctor:   Lydia Keenan DO    Consulting Provider(s):  Consults       Date and Time Order Name Status Description    1/30/2024  6:07 AM Inpatient Neurology Consult General      1/30/2024  6:07 AM Inpatient General Surgery Consult               Admitting Diagnosis:   The primary encounter diagnosis was New onset seizure. Diagnoses of Nonspecific abdominal pain, Nausea and vomiting, unspecified vomiting type, Hyponatremia, Status post partial resection of colon, History of Crohn's disease, Hypomagnesemia, and Lactic acidosis were also pertinent to this visit.    Most Recent Vitals:   Vitals:    01/30/24 0400 01/30/24 0500 01/30/24 0709 01/30/24 0711   BP: 139/99 126/93     Pulse: 86 91 99 94   Resp: 16 16     Temp:       TempSrc:       SpO2: 98% 90% (!) 89% 95%   Weight:       Height:            Active LDAs/IV Access:   Lines, Drains & Airways       Active LDAs       Name Placement date Placement time Site Days    Peripheral IV 01/29/24 2038 Left Antecubital 01/29/24 2038  Antecubital  less than 1    Peripheral IV 01/30/24 0643 Posterior;Right Hand 01/30/24  0643  Hand  less than 1                    Labs (abnormal labs have a star):   Labs Reviewed   COMPREHENSIVE METABOLIC PANEL - Abnormal; Notable for the following components:       Result Value    Glucose 118 (*)     Creatinine 1.05 (*)     Sodium 129 (*)     Potassium 3.3 (*)     Chloride 88 (*)     CO2 21.0 (*)     Calcium 8.5 (*)     BUN/Creatinine Ratio 6.7 (*)     Anion Gap 20.0 (*)     All other components within normal limits    Narrative:     GFR Normal >60  Chronic Kidney Disease <60  Kidney Failure <15     URINALYSIS W/ MICROSCOPIC IF INDICATED (NO CULTURE) - Abnormal; Notable for the following components:    pH, UA 8.5 (*)     Ketones, UA Trace (*)     All other components within normal limits    Narrative:     Urine microscopic not indicated.   LACTIC ACID, PLASMA - Abnormal; Notable for the following components:    Lactate 3.8 (*)     All other components within normal limits   CBC WITH AUTO DIFFERENTIAL - Abnormal; Notable for the following components:    WBC 15.34 (*)     RBC 3.41 (*)     Hemoglobin 11.5 (*)     Hematocrit 33.2 (*)     MCV 97.4 (*)     MCH 33.7 (*)     RDW 11.9 (*)     Immature Grans % 0.7 (*)     Neutrophils, Absolute 10.75 (*)     Lymphocytes, Absolute 3.49 (*)     Immature Grans, Absolute 0.10 (*)     All other components within normal limits   MAGNESIUM - Abnormal; Notable for the following components:    Magnesium 1.0 (*)     All other components within normal limits   SODIUM - Abnormal; Notable for the following components:    Sodium 130 (*)     All other components within normal limits   CBC WITH AUTO DIFFERENTIAL - Abnormal; Notable for the following components:    WBC 11.09 (*)     RBC 2.95 (*)      Hemoglobin 10.1 (*)     Hematocrit 29.4 (*)     MCV 99.7 (*)     MCH 34.2 (*)     RDW 12.1 (*)     Neutrophil % 77.4 (*)     Lymphocyte % 15.9 (*)     Neutrophils, Absolute 8.59 (*)     All other components within normal limits   POCT CHEM 8 - Abnormal; Notable for the following components:    BUN 7 (*)     Sodium 128 (*)     POC Potassium 3.2 (*)     Chloride 92 (*)     Total CO2 20 (*)     Hematocrit 36 (*)     Ionized Calcium 1.05 (*)     All other components within normal limits   LIPASE - Normal   LACTIC ACID, REFLEX - Normal   ETHANOL - Normal    Narrative:     Elevated lactic acid concentration and lactate dehydrogenase(LD) activity may falsely elevate enzymatically determined ethanol levels. Not for legal purposes.    SEDIMENTATION RATE - Normal   C-REACTIVE PROTEIN - Normal   GASTROINTESTINAL PANEL, PCR (What They Like)   CLOSTRIDIOIDES DIFFICILE TOXIN    Narrative:     The following orders were created for panel order Clostridioides difficile Toxin - Stool, Per Rectum.  Procedure                               Abnormality         Status                     ---------                               -----------         ------                     Clostridioides difficile...[[id number]]                                                   Please view results for these tests on the individual orders.   CLOSTRIDIOIDES DIFFICILE TOXIN, PCR   RAINBOW DRAW    Narrative:     The following orders were created for panel order [place] Draw.  Procedure                               Abnormality         Status                     ---------                               -----------         ------                     Green Top (Gel)[[id number]]                                  Final result               Lavender Top[[id number]]                                     Final result               Gold Top - SST[[id number]]                                   Final result               Gray Top[[id number]]                                          Final result               Light Blue Top[[id number]]                                   Final result                 Please view results for these tests on the individual orders.   BASIC METABOLIC PANEL   MAGNESIUM   BASIC METABOLIC PANEL   SODIUM, URINE, RANDOM   OSMOLALITY, URINE   OSMOLALITY, SERUM   CBC AND DIFFERENTIAL    Narrative:     The following orders were created for panel order CBC & Differential.  Procedure                               Abnormality         Status                     ---------                               -----------         ------                     CBC Auto Differential[[id number]]        Abnormal            Final result                 Please view results for these tests on the individual orders.   GREEN TOP   LAVENDER TOP   GOLD TOP - SST   GRAY TOP   LIGHT BLUE TOP       Meds Given in ED:   Medications   Sodium Chloride (PF) 0.9 % 10 mL (has no administration in time range)   sodium chloride 0.9 % flush 10 mL (has no administration in time range)   Magnesium Standard Dose Replacement - Follow Nurse / BPA Driven Protocol (has no administration in time range)   magnesium sulfate 2g/50 mL (PREMIX) infusion (0 g Intravenous Stopped 1/30/24 0246)     Followed by   magnesium sulfate 4g/100mL (PREMIX) infusion (4 g Intravenous New Bag 1/30/24 0251)   albuterol (PROVENTIL) nebulizer solution 0.083% 2.5 mg/3mL (has no administration in time range)   busPIRone (BUSPAR) tablet 15 mg (has no administration in time range)   cetirizine (zyrTEC) tablet 10 mg (has no administration in time range)   cloNIDine (CATAPRES) tablet 0.1 mg (has no administration in time range)   budesonide (PULMICORT) nebulizer solution 0.5 mg (has no administration in time range)   ALPRAZolam (XANAX) tablet 1 mg (has no administration in time range)   cholestyramine light packet 4 g (has no administration in time range)   montelukast (SINGULAIR) tablet 10 mg (has no administration in time range)   propranolol (INDERAL)  tablet 40 mg (has no administration in time range)   sucralfate (CARAFATE) tablet 1 g (has no administration in time range)   vortioxetine (TRINTELLIX) tablet 20 mg - PATIENT SUPPLIED (has no administration in time range)   sodium chloride 0.9 % flush 10 mL (has no administration in time range)   sodium chloride 0.9 % flush 10 mL (has no administration in time range)   sodium chloride 0.9 % infusion 40 mL (has no administration in time range)   nitroglycerin (NITROSTAT) SL tablet 0.4 mg (has no administration in time range)   acetaminophen (TYLENOL) tablet 650 mg (has no administration in time range)     Or   acetaminophen (TYLENOL) 160 MG/5ML oral solution 650 mg (has no administration in time range)     Or   acetaminophen (TYLENOL) suppository 650 mg (has no administration in time range)   Potassium Replacement - Follow Nurse / BPA Driven Protocol (has no administration in time range)   Phosphorus Replacement - Follow Nurse / BPA Driven Protocol (has no administration in time range)   Calcium Replacement - Follow Nurse / BPA Driven Protocol (has no administration in time range)   ondansetron ODT (ZOFRAN-ODT) disintegrating tablet 4 mg ( Oral Not Given:  See Alt 1/30/24 0645)     Or   ondansetron (ZOFRAN) injection 4 mg (4 mg Intravenous Given 1/30/24 0645)   sodium chloride 0.9 % infusion (50 mL/hr Intravenous New Bag 1/30/24 0417)   sodium chloride 0.9 % bolus 1,000 mL (0 mL Intravenous Stopped 1/29/24 2150)   ondansetron (ZOFRAN) injection 4 mg (4 mg Intravenous Given 1/29/24 2054)   famotidine (PEPCID) injection 20 mg (20 mg Intravenous Given 1/29/24 2053)   LORazepam (ATIVAN) injection 2 mg (2 mg Intravenous Given by Other 1/29/24 2042)   sodium chloride 0.9 % bolus 1,000 mL (0 mL Intravenous Stopped 1/30/24 0103)   iopamidol (ISOVUE-300) 61 % injection 100 mL (85 mL Intravenous Given 1/29/24 2313)   levETIRAcetam (KEPPRA) injection 1,000 mg (1,000 mg Intravenous Given 1/30/24 0007)   potassium chloride 10 mEq  in 100 mL IVPB (0 mEq Intravenous Stopped 1/30/24 2752)     sodium chloride, 50 mL/hr, Last Rate: 50 mL/hr (01/30/24 0483)

## 2024-01-30 NOTE — CONSULTS
[facility] Neurology  Consult Note    Patient Name: Isi Bolton  : 1970  MRN: [id number]  Primary Care Physician:  Chloe Barksdale MD  Referring Physician: No ref. provider found  Date of admission: 2024    Subjective     Reason for Consult/ Chief Complaint: Seizure-like activity    Isi Bolton is a 53 y.o. female with a past medical history of Crohn's disease, HTN, GERD, anxiety, depression, gastric diverticulitis s/p laparoscopic resection, hill fundoplication (on 2024 with Dr. Haji) who presented to [facility] ED with complaint of nausea, vomiting, diarrhea and abdominal pain.  Patient endorses fatigue, dizziness, lightheadedness and headache.  Patient endorses after surgery she had been progressing her food appropriately complaint.  However over the past 2 days she has been experiencing severe nausea and vomiting.  She has also developed diarrhea.  She has complaint of epigastric pain.  Patient states that she does not frequently consume alcohol.  She states her last drink was on 24.      While in the ED there was concern for possible seizure-like activity and patient was loaded with Keppra and Ativan.  It is unclear if event was witnessed by medical professionals.  Per patient she did not lose control of bowel or bladder.  No tongue bite is noted.  She states she is unable to recall exact details of the event.  She states her boyfriend witnessed the event.  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  Patient denies any aura prior to event or postictal phase.  Patient denies any previous seizure history.      Review Of Systems   Constitutional: Well-developed female  Cardiovascular: Negative for chest pain or palpitations.  Respiratory: Negative for shortness of breath or cough.  Gastrointestinal: Positive for nausea and vomiting  Genitourinary: Positive for diarrhea  Musculoskeletal: Negative for aches and pains in the muscles or  joints.  Dermatological: Negative for skin breakdown.   Neurological: Positive for seizure activity    Personal History     Past Medical History:   Diagnosis Date    Angina pectoris     Asthma     Cancer     cervical    Crohn disease     Deaf, right     Heart disease     Hypertension     PONV (postoperative nausea and vomiting)        Past Surgical History:   Procedure Laterality Date    CHOLECYSTECTOMY      ENDOSCOPY N/A 12/26/2023    Procedure: ESOPHAGOGASTRODUODENOSCOPY;  Surgeon: Eh Pascal MD;  Location: [facility] ENDOSCOPY;  Service: Gastroenterology;  Laterality: N/A;    NISSEN FUNDOPLICATION N/A 1/18/2024    Procedure: LAPAROSCOPIC RESECTION GASTRIC DIVERTICULUM, AND HILL FUNDOPLICATIO;  Surgeon: Vishal Haji MD;  Location: [facility] OR;  Service: General;  Laterality: N/A;    TUBAL ABDOMINAL LIGATION         Family History: family history includes Breast cancer in her maternal grandmother and paternal aunt; Cancer in her maternal grandmother, maternal uncle, and paternal uncle; Heart attack in her cousin and maternal uncle; Hypertension in her mother; No Known Problems in her father and sister. Otherwise pertinent FHx was reviewed and not pertinent to current issue.    Social History:  reports that she has quit smoking. Her smoking use included electronic cigarette. She has been exposed to tobacco smoke. She has never used smokeless tobacco. She reports that she does not currently use alcohol. She reports that she does not use drugs.    Home Medications:   ALPRAZolam, HYDROcodone-acetaminophen, Vortioxetine HBr, albuterol sulfate HFA, busPIRone, cetirizine, cloNIDine, colestipol, docusate, fluticasone, hydrOXYzine pamoate, hydroCHLOROthiazide, lisinopril, montelukast, naloxone, ondansetron ODT, promethazine, propranolol, and sucralfate    Current Medications:     Current Facility-Administered Medications:     acetaminophen (TYLENOL) tablet 650 mg, 650 mg, Oral, Q4H PRN **OR** acetaminophen (TYLENOL) 160  MG/5ML oral solution 650 mg, 650 mg, Oral, Q4H PRN **OR** acetaminophen (TYLENOL) suppository 650 mg, 650 mg, Rectal, Q4H PRN, Jeannie Galaviz APRN    albuterol (PROVENTIL) nebulizer solution 0.083% 2.5 mg/3mL, 2.5 mg, Nebulization, Q4H PRN, Jeannie Galaviz APRN    ALPRAZolam (XANAX) tablet 1 mg, 1 mg, Oral, BID PRN, Jeannie Galaviz APRN    budesonide (PULMICORT) nebulizer solution 0.5 mg, 0.5 mg, Nebulization, BID - RT, Jeannie Galaviz APRN    busPIRone (BUSPAR) tablet 15 mg, 15 mg, Oral, Q12H, Jeannie Galaviz APRN    Calcium Replacement - Follow Nurse / BPA Driven Protocol, , Does not apply, PRN, Jeannie Galaviz APRN    cetirizine (zyrTEC) tablet 10 mg, 10 mg, Oral, Daily, Jeannie Galaviz APRN    cholestyramine light packet 4 g, 1 packet, Oral, Daily, Jeannie Galaviz APRN    cloNIDine (CATAPRES) tablet 0.1 mg, 0.1 mg, Oral, BID, Jeannie Galaviz APRN, 0.1 mg at 01/30/24 0957    Magnesium Standard Dose Replacement - Follow Nurse / BPA Driven Protocol, , Does not apply, PRN, Jeannie Galaviz APRN    montelukast (SINGULAIR) tablet 10 mg, 10 mg, Oral, Nightly, Jenanie Galaviz APRN    nitroglycerin (NITROSTAT) SL tablet 0.4 mg, 0.4 mg, Sublingual, Q5 Min PRN, Jeannie Galaviz APRN    ondansetron ODT (ZOFRAN-ODT) disintegrating tablet 4 mg, 4 mg, Oral, Q6H PRN **OR** ondansetron (ZOFRAN) injection 4 mg, 4 mg, Intravenous, Q6H PRN, Jeannie Galaviz APRN, 4 mg at 01/30/24 0645    Phosphorus Replacement - Follow Nurse / BPA Driven Protocol, , Does not apply, Guillaume WALDEN Angela W, APRN    Potassium Replacement - Follow Nurse / BPA Driven Protocol, , Does not apply, Guillaume WALDEN Angela W, APRN    propranolol (INDERAL) tablet 40 mg, 40 mg, Oral, Q12H, Jeannie Galaviz APRN    Sodium Chloride (PF) 0.9 % 10 mL, 10 mL, Intravenous, PRN, Jeannie Galaviz APRN    [COMPLETED] Insert Peripheral IV, , , Once **AND** sodium chloride 0.9 %  flush 10 mL, 10 mL, Intravenous, PRN, Jabier Iglesias PA    sodium chloride 0.9 % flush 10 mL, 10 mL, Intravenous, Q12H, Jeannie Galaviz APRN, 10 mL at 01/30/24 0956    sodium chloride 0.9 % flush 10 mL, 10 mL, Intravenous, PRN, Jeannie Galaviz, VERONIQUE    sodium chloride 0.9 % infusion 40 mL, 40 mL, Intravenous, PRN, Jeannie Galaviz, VERONIQUE    sodium chloride 0.9 % infusion, 50 mL/hr, Intravenous, Continuous, Eh Sheffield MD, Stopped at 01/30/24 0734    sucralfate (CARAFATE) tablet 1 g, 1 g, Oral, 4x Daily AC & at Bedtime, Jeannie Galaviz APRN, 1 g at 01/30/24 1218    vortioxetine (TRINTELLIX) tablet 20 mg - PATIENT SUPPLIED, 20 mg, Oral, Daily, Jeannie Galaviz APRN     Allergies:  Allergies   Allergen Reactions    Metoprolol Other (See Comments)     Pt reports no longer taking; reports that it caused heart rate to increase into 170's; and elevated SBP >200    Pt reports no longer taking; reports that it caused heart rate to increase into 170's; and elevated SBP >200       Objective     Physical Exam  Vitals and nursing note reviewed.   Constitutional:       General: She is not in acute distress.     Appearance: She is not ill-appearing.   Eyes:      Extraocular Movements: Extraocular movements intact.      Pupils: Pupils are equal, round, and reactive to light.      Comments: No nystagmus or deviated gaze noted   Neurological:      Mental Status: She is alert and oriented to person, place, and time.      Cranial Nerves: Cranial nerves 2-12 are intact.      Sensory: Sensation is intact.      Motor: Motor function is intact.      Coordination: Coordination is intact.      Deep Tendon Reflexes: Babinski sign absent on the right side. Babinski sign absent on the left side.      Reflex Scores:       Bicep reflexes are 2+ on the right side and 2+ on the left side.       Patellar reflexes are 2+ on the right side and 2+ on the left side.     Comments:     Cranial Nerves   CN II: Pupils are  "equal, round, and reactive to light. Normal visual acuity and visual fields.    CN III IV VI: Extraocular movements are full without nystagmus.  CN V: Normal facial sensation and strength of muscles of mastication.  CN VII: Facial movements are symmetric. No weakness.  CN VIII:  Auditory acuity is normal.  CN IX & X:  Symmetric palatal movement.  CN XI: Sternocleidomastoid and trapezius are normal.  No weakness.  CN XII: The tongue is midline.  No atrophy or fasciculations.              Vitals:  Temp:  [97.9 °F (36.6 °C)-98.5 °F (36.9 °C)] 97.9 °F (36.6 °C)  Heart Rate:  [78-99] 78  Resp:  [16-20] 20  BP: (121-147)/() 135/100  Flow (L/min):  [2] 2    Laboratory Results:   Lab Results   Component Value Date    GLUCOSE 106 (H) 01/30/2024    CALCIUM 7.7 (L) 01/30/2024     (L) 01/30/2024    K 4.1 01/30/2024    CO2 20.0 (L) 01/30/2024    CL 99 01/30/2024    BUN 6 01/30/2024    CREATININE 0.89 01/30/2024    EGFRIFNONA 84 04/06/2017    BCR 6.7 (L) 01/30/2024    ANIONGAP 16.0 (H) 01/30/2024     Lab Results   Component Value Date    WBC 11.09 (H) 01/30/2024    HGB 10.1 (L) 01/30/2024    HCT 29.4 (L) 01/30/2024    MCV 99.7 (H) 01/30/2024     01/30/2024     Lab Results   Component Value Date    CHOL 205 (H) 08/21/2023    CHOL 186 07/11/2017     Lab Results   Component Value Date     (H) 08/21/2023    HDL 85 07/11/2017     Lab Results   Component Value Date    LDL 78 08/21/2023    LDL 89 07/11/2017     Lab Results   Component Value Date    TRIG 142 08/21/2023    TRIG 61 07/11/2017     Lab Results   Component Value Date    HGBA1C 5.20 01/17/2024     No results found for: \"INR\", \"PROTIME\"  Lab Results   Component Value Date    IFHSBSJQ31 >2,000 (H) 04/06/2017     Lab Results   Component Value Date    FOLATE 14.61 04/06/2017       MRI Brain Without Contrast    Result Date: 1/30/2024  MRI BRAIN WO CONTRAST Date of Exam: 1/30/2024 7:40 AM EST Indication: Seizure, new-onset, no history of trauma.  " Comparison: 4/11/2017. Technique:  Routine multiplanar/multisequence sequence images of the brain were obtained without contrast administration. Findings: There are no areas of restricted diffusion. There are a few scattered foci of increased signal intensity in the right frontal and parietal lobes as best seen on the inversion recovery images. This has advanced only minimally since the prior study. There is no acute mass effect or edema. There is no acute CVA or hemorrhage. Ventricles are normal in size and shape and are midline. Structures of the posterior fossa and brainstem appear normal.    Impression: Impression: Evidence of mild chronic small vessel ischemic insult. No acute process. Electronically Signed: Adwoa Eller MD  1/30/2024 8:34 AM EST  Workstation ID: [id number]      Assessment / Plan   Brief Patient Summary:  Isi Bolton is a 53 y.o. female with a past medical history of Crohn's disease, HTN, GERD, anxiety, depression, gastric diverticulitis s/p laparoscopic resection, hill fundoplication (on 1/18/2024 with Dr. Haji) who presented to [facility] ED with complaint of nausea, vomiting, diarrhea and abdominal pain.     Plan:   Seizure-like activity  EtOH use  Electrolyte imbalance  Given patient's negative neurologic workup and circumstances surrounding event.  It is likely patient experienced a vasovagal response rather than epileptic seizure.  Will hold off on the addition of AEM's at this time.  If patient has any witnessed seizure activity can load with 1 g of Keppra and Valium.  EEG revealed a normal study, with no focal features or epileptic activity seen.  MRI with no acute process, evidence of mild chronic small vessel ischemic changes.  Vitamin B12 and folate  Continue seizure and fall precautions  Initiate CIWA protocol  Continue patient's home Xanax as needed for anxiety  Replace electrolytes per protocol  Continue patient's home BuSpar 15 mg twice daily  Neurochecks every 4  hours  General neurology will continue to follow.    I have discussed the above with the patient, bedside RN Dr. Keenan  Time spent with patient: 80 minutes in face-to-face evaluation and management of the patient.       Breana Lynne, APRN

## 2024-01-30 NOTE — CASE MANAGEMENT/SOCIAL WORK
Discharge Planning Assessment  [facility]     Patient Name: Isi Bolton  MRN: [id number]  Today's Date: 1/30/2024    Admit Date: 1/29/2024    Plan: IDP   Discharge Needs Assessment       Row Name 01/30/24 1122       Living Environment    People in Home alone    Current Living Arrangements home    Potentially Unsafe Housing Conditions none    In the past 12 months has the electric, gas, oil, or water company threatened to shut off services in your home? No    Primary Care Provided by self    Provides Primary Care For child(everett)    Caregiving Concerns Meghna Mari    Family Caregiver if Needed none    Quality of Family Relationships unable to assess    Able to Return to Prior Arrangements yes       Resource/Environmental Concerns    Resource/Environmental Concerns home accessibility    Home Accessibility Concerns stairs to enter home    Transportation Concerns none       Transportation Needs    In the past 12 months, has lack of transportation kept you from medical appointments or from getting medications? no    In the past 12 months, has lack of transportation kept you from meetings, work, or from getting things needed for daily living? No       Food Insecurity    Within the past 12 months, you worried that your food would run out before you got the money to buy more. Never true    Within the past 12 months, the food you bought just didn't last and you didn't have money to get more. Never true       Transition Planning    Patient/Family Anticipates Transition to home    Patient/Family Anticipated Services at Transition none    Transportation Anticipated family or friend will provide       Discharge Needs Assessment    Readmission Within the Last 30 Days no previous admission in last 30 days    Equipment Currently Used at Home none    Concerns to be Addressed no discharge needs identified    Anticipated Changes Related to Illness none    Equipment Needed After Discharge none                   Discharge Plan        Row Name 01/30/24 1123       Plan    Plan IDP    Patient/Family in Agreement with Plan yes    Plan Comments Spoke with patient at bedside for IDP. Lives in [facility] alone. Ind. Currently works at [employer]. No HH or DME. PCP Chloe Barksdale. [facility] with scripts filled at [employer]. Plan is to return home with transport. CM will cont to follow.    Final Discharge Disposition Code 01 - home or self-care                  Continued Care and Services - Admitted Since 1/29/2024    Coordination has not been started for this encounter.       Selected Continued Care - Episodes Includes continued care and service providers with selected services from the active episodes listed below      Rising Risk Care Management Episode start date: 1/30/2024   There are no active outsourced providers for this episode.                    Demographic Summary       Row Name 01/30/24 1119       General Information    Admission Type observation    Arrived From emergency department    Referral Source admission list    Reason for Consult discharge planning    Preferred Language English                   Functional Status       Row Name 01/30/24 1120       Functional Status    Usual Activity Tolerance good    Current Activity Tolerance good       Physical Activity    On average, how many days per week do you engage in moderate to strenuous exercise (like a brisk walk)? 3 days    On average, how many minutes do you engage in exercise at this level? 30 min    Number of minutes of exercise per week 90       Functional Status, IADL    Medications independent    Meal Preparation independent    Housekeeping independent    Laundry independent    Shopping independent                   Psychosocial    No documentation.                  Abuse/Neglect    No documentation.                  Legal    No documentation.                  Substance Abuse    No documentation.                  Patient Forms    No documentation.                      Shayna Coleman, RN

## 2024-01-30 NOTE — PLAN OF CARE
Goal Outcome Evaluation:            VSS, 02 2L, IV infusing, c-diff + however the antigen is negative. A&O, CIWAA = 0-1. Wll continue current level of care

## 2024-01-30 NOTE — PROGRESS NOTES
[facility] Medicine Services  ADMISSION FOLLOW-UP NOTE          Patient admitted after midnight, H&P by my partner performed earlier on today's date reviewed.  Interim findings, labs, and charting also reviewed.        The [facility] Hospital Problem List has been managed and updated to include any new diagnoses:  Active Hospital Problems    Diagnosis  POA    **Seizure [R56.9]  Yes    Lactic acidosis [E87.20]  Unknown    Hypokalemia [E87.6]  Unknown    Hyponatremia [E87.1]  Unknown    ETOH abuse [F10.10]  Unknown    Hypomagnesemia [E83.42]  Yes    Inflammatory bowel disease (Crohn's disease) [K50.90]  Yes    Essential hypertension [I10]  Yes      Resolved Hospital Problems   No resolved problems to display.         ADDITIONAL PLAN:  - detailed assessment and plan from admission reviewed    Isi Bolton is a 53 y.o. female with a past medical history of Crohn's disease, HTN, GERD, anxiety, depression, gastric diverticulitis s/p laparoscopic resection, hill fundoplication (on 1/18/2024 with Dr. Haji) who presented to [facility] ED with complaint of nausea, vomiting, diarrhea and abdominal pain.  Patient endorses fatigue, dizziness, lightheadedness and headache.  Patient endorses after surgery she had been progressing her food appropriately complaint.  However over the past 2 days she has been experiencing severe nausea and vomiting.  She has also developed diarrhea.  She has complaint of epigastric pain.  Patient states that she does not frequently consume alcohol.  She states her last drink was on 1/26/24.       While in the ED there was concern for possible seizure-like activity and patient was loaded with Keppra and Ativan.  It is unclear if event was witnessed by medical professionals.  Per patient she did not lose control of bowel or bladder.  No tongue bite is noted.  She states she is unable to recall exact details of the event.  She states her boyfriend witnessed the event.  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  Patient denies any aura prior to event or postictal phase.  Patient denies any previous seizure history    Seizure-like activity   --Discussed with Neurology, not entirely convinced this was a seizure  --EEG WNL  --MRI brain WNL   --Seizure precautions  --CIWA per shift, if starts scoring will add PRNs   --Episode likely 2/2 to acute illness, electrolyte derangements, dehydration     Lactic acidosis--resolved  -- S/P IVF     Hypomagnesemia  --Resolved with replacement      Hyponatremia  -- Resolved with IVF     Hypokalemia  --Resolved with replacement      Leukocytosis  -- Trending down, no left shift   -- Likely reactive 2/2 above      Abdominal pain with nausea, vomiting, diarrhea  Crohn's disease  Gastric diverticulum   -- s/p laparoscopic resection, hill fundoplication, EGD with Dr. Haji on 1/18/2024  -- CT abdomen pelvis shows no acute intra-abdominal or intrapelvic process.  Significant postsurgical changes are present within the stomach, no evidence of obstruction, no abnormal free fluid or collection identified.  -- Dr Haji evaluated, felt likely 2/2 to her Crohn's   -- GI PCR negative  -- C diff toxin + but Ag negative. 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 Never been treated for it. No recent ABX except one dose of Cefazolin prior to surgery on 1/18.   -- Will ask ID to weight in on if they feel infection vs colonization      HTN  -- continue lisinopril, HCTZ     Anxiety  -- Xanax, buspar, hydroxyzine      Expected Discharge Date: 2/2/2024; Expected Discharge Time:      Lydia Keenan DO  01/30/24

## 2024-01-30 NOTE — THERAPY DISCHARGE NOTE
Patient Name: Isi Bolton  : 1970    MRN: [id number]                              Today's Date: 2024       Admit Date: 2024    Visit Dx:     ICD-10-CM ICD-9-CM   1. New onset seizure  R56.9 780.39   2. Nonspecific abdominal pain  R10.9 789.00   3. Nausea and vomiting, unspecified vomiting type  R11.2 787.01   4. Hyponatremia  E87.1 276.1   5. Status post partial resection of colon  Z90.49 V45.89   6. History of Crohn's disease  Z87.19 V12.70   7. Hypomagnesemia  E83.42 275.2   8. Lactic acidosis  E87.20 276.2     Patient Active Problem List   Diagnosis    Essential hypertension    Tachycardia    Hypomagnesemia    Inflammatory bowel disease (Crohn's disease)    Gastritis    Gastric diverticulum    Anxiety    Seizure    Lactic acidosis    Hypokalemia    Hyponatremia    ETOH abuse     Past Medical History:   Diagnosis Date    Angina pectoris     Asthma     Cancer     cervical    Crohn disease     Deaf, right     Heart disease     Hypertension     PONV (postoperative nausea and vomiting)      Past Surgical History:   Procedure Laterality Date    CHOLECYSTECTOMY      ENDOSCOPY N/A 2023    Procedure: ESOPHAGOGASTRODUODENOSCOPY;  Surgeon: Eh Pascal MD;  Location: [facility] ENDOSCOPY;  Service: Gastroenterology;  Laterality: N/A;    NISSEN FUNDOPLICATION N/A 2024    Procedure: LAPAROSCOPIC RESECTION GASTRIC DIVERTICULUM, AND HILL FUNDOPLICATIO;  Surgeon: Vishal Haji MD;  Location: [facility] OR;  Service: General;  Laterality: N/A;    TUBAL ABDOMINAL LIGATION        General Information       Row Name 24 1058          Physical Therapy Time and Intention    Document Type discharge evaluation/summary  -DORINA     Mode of Treatment physical therapy  -DORINA       Row Name 24 1058          General Information    Patient Profile Reviewed yes  -DORINA     Prior Level of Function independent:;all household mobility;community mobility;ADL's;using stairs;driving;transfer;gait  -DORINA      Existing Precautions/Restrictions seizures  -DORINA     Barriers to Rehab none identified  -DORINA       Row Name 01/30/24 1058          Living Environment    People in Home alone  -DORINA       Row Name 01/30/24 1058          Home Main Entrance    Number of Stairs, Main Entrance two  -DORINA     Stair Railings, Main Entrance none  -DORINA       Row Name 01/30/24 1058          Stairs Within Home, Primary    Number of Stairs, Within Home, Primary none  -DORINA       Row Name 01/30/24 1058          Cognition    Orientation Status (Cognition) oriented x 4  -DORINA               User Key  (r) = Recorded By, (t) = Taken By, (c) = Cosigned By      Initials Name Provider Type    Sarita Mcclendon PT Physical Therapist                   Mobility       Row Name 01/30/24 1058          Bed Mobility    Bed Mobility supine-sit;sit-supine;scooting/bridging  -DORINA     Scooting/Bridging Graves (Bed Mobility) independent  -DORINA     Supine-Sit Graves (Bed Mobility) independent  -DORINA     Sit-Supine Graves (Bed Mobility) independent  -DORINA     Assistive Device (Bed Mobility) head of bed elevated  -DORINA     Comment, (Bed Mobility) No assist needed.  -DORINA       Row Name 01/30/24 1058          Transfers    Comment, (Transfers) independent.  -DORINA       Row Name 01/30/24 1058          Sit-Stand Transfer    Sit-Stand Graves (Transfers) independent  -DORINA       Row Name 01/30/24 1058          Gait/Stairs (Locomotion)    Graves Level (Gait) independent  -DORINA     Patient was able to Ambulate yes  -DORINA     Distance in Feet (Gait) 50  -DORINA     Comment, (Gait/Stairs) Pt demo step thorugh gait pattern with no significant deviations.  -DORINA               User Key  (r) = Recorded By, (t) = Taken By, (c) = Cosigned By      Initials Name Provider Type    Sarita Mcclendon, PT Physical Therapist                   Obj/Interventions       Row Name 01/30/24 1104          Range of Motion Comprehensive    General Range of Motion bilateral lower extremity ROM WFL  -DORINA        Row Name 01/30/24 1104          Strength Comprehensive (MMT)    General Manual Muscle Testing (MMT) Assessment no strength deficits identified  -       Row Name 01/30/24 1104          Balance    Balance Assessment sitting dynamic balance;sit to stand dynamic balance;standing static balance;standing dynamic balance;sitting static balance  -DORINA     Static Sitting Balance independent  -DORINA     Dynamic Sitting Balance independent  -DORINA     Position, Sitting Balance unsupported;sitting edge of bed  -DORINA     Sit to Stand Dynamic Balance independent  -DORINA     Static Standing Balance independent  -DORINA     Dynamic Standing Balance independent  -DORINA     Position/Device Used, Standing Balance unsupported  -DORINA     Balance Interventions sitting;standing;sit to stand;occupation based/functional task;weight shifting activity  -DORINA     Comment, Balance no LOB  -DORINA       Row Name 01/30/24 1104          Sensory Assessment (Somatosensory)    Sensory Assessment (Somatosensory) LE sensation intact  -DORINA               User Key  (r) = Recorded By, (t) = Taken By, (c) = Cosigned By      Initials Name Provider Type    Sarita Mcclendon, PT Physical Therapist                   Goals/Plan    No documentation.                  Clinical Impression       Row Name 01/30/24 1104          Pain    Pretreatment Pain Rating 0/10 - no pain  -DORINA     Posttreatment Pain Rating 0/10 - no pain  -       Row Name 01/30/24 1104          Plan of Care Review    Plan of Care Reviewed With patient  -DORINA     Progress no change  -DORINA     Outcome Evaluation PT evaluation completed. Pt is independent with transfers and gait, demo good safety awareness and no LOB. No skilled needs identified. Recommend home upon DC.  -       Row Name 01/30/24 1104          Therapy Assessment/Plan (PT)    Patient/Family Therapy Goals Statement (PT) return home and to work  -DORINA     Criteria for Skilled Interventions Met (PT) no;no problems identified which require skilled intervention   -DORINA     Therapy Frequency (PT) evaluation only  -DORINA       Row Name 01/30/24 1104          Vital Signs    Pre Systolic BP Rehab --  Not recorded due to spore precautions.  -DORINA     O2 Delivery Pre Treatment room air  -DORINA     O2 Delivery Intra Treatment room air  -DORINA     O2 Delivery Post Treatment room air  -DORINA     Pre Patient Position Supine  -DORINA     Intra Patient Position Standing  -DORINA     Post Patient Position Supine  -DORINA       Row Name 01/30/24 1104          Positioning and Restraints    Pre-Treatment Position in bed  -DORINA     Post Treatment Position bed  -DORINA     In Bed notified nsg;supine;encouraged to call for assist;call light within reach  -DORINA               User Key  (r) = Recorded By, (t) = Taken By, (c) = Cosigned By      Initials Name Provider Type    Sarita Mcclendon, PT Physical Therapist                   Outcome Measures       Row Name 01/30/24 1105 01/30/24 1001       How much help from another person do you currently need...    Turning from your back to your side while in flat bed without using bedrails? 4  -DORINA 4  -EB    Moving from lying on back to sitting on the side of a flat bed without bedrails? 4  -DORINA 4  -EB    Moving to and from a bed to a chair (including a wheelchair)? 4  -DORINA 4  -EB    Standing up from a chair using your arms (e.g., wheelchair, bedside chair)? 4  -DORINA 4  -EB    Climbing 3-5 steps with a railing? 3  -DORINA 3  -EB    To walk in hospital room? 4  -DORINA 4  -EB    AM-PAC 6 Clicks Score (PT) 23  -DORINA 23  -EB    Highest Level of Mobility Goal 7 --> Walk 25 feet or more  -DORINA 7 --> Walk 25 feet or more  -EB      Row Name 01/30/24 1105          Functional Assessment    Outcome Measure Options AM-PAC 6 Clicks Basic Mobility (PT)  -DORINA               User Key  (r) = Recorded By, (t) = Taken By, (c) = Cosigned By      Initials Name Provider Type    Judy Phillips RN Registered Nurse    Sarita Mcclendon, PT Physical Therapist                  Physical Therapy Education       Title: PT  OT SLP Therapies (Done)       Topic: Physical Therapy (Done)       Point: Mobility training (Done)       Learning Progress Summary             Patient Acceptance, E, VU,DU by DORINA at 1/30/2024 1106                         Point: Body mechanics (Done)       Learning Progress Summary             Patient Acceptance, E, VU,DU by DORINA at 1/30/2024 1106                         Point: Precautions (Done)       Learning Progress Summary             Patient Acceptance, E, VU,DU by  at 1/30/2024 1106                                         User Key       Initials Effective Dates Name Provider Type Discipline     06/16/21 -  Sarita Nieto PT Physical Therapist PT                  PT Recommendation and Plan     Plan of Care Reviewed With: patient  Progress: no change  Outcome Evaluation: PT evaluation completed. Pt is independent with transfers and gait, demo good safety awareness and no LOB. No skilled needs identified. Recommend home upon DC.     Time Calculation:   PT Evaluation Complexity  History, PT Evaluation Complexity: no personal factors and/or comorbidities  Examination of Body Systems (PT Eval Complexity): 1-2 elements  Clinical Presentation (PT Evaluation Complexity): stable  Clinical Decision Making (PT Evaluation Complexity): low complexity  Overall Complexity (PT Evaluation Complexity): low complexity     PT Charges       Row Name 01/30/24 1107             Time Calculation    Start Time 1038  -DORINA      PT Received On 01/30/24  -DORINA      PT Goal Re-Cert Due Date 02/09/24  -DORINA         Untimed Charges    PT Eval/Re-eval Minutes 40  -DORINA         Total Minutes    Untimed Charges Total Minutes 40  -DORINA       Total Minutes 40  -DORINA                User Key  (r) = Recorded By, (t) = Taken By, (c) = Cosigned By      Initials Name Provider Type    Sarita Mcclendon, PT Physical Therapist                  Therapy Charges for Today       Code Description Service Date Service Provider Modifiers Qty    70946121620 HC PT EVAL  LOW COMPLEXITY 3 1/30/2024 Sarita Nieto, PT GP 1            PT G-Codes  Outcome Measure Options: AM-PAC 6 Clicks Basic Mobility (PT)  AM-PAC 6 Clicks Score (PT): 23    PT Discharge Summary  Anticipated Discharge Disposition (PT): home    Sarita Nieto, PT  1/30/2024

## 2024-01-30 NOTE — H&P
[facility]  HISTORY AND PHYSICAL    Patient Name: Isi Bolton  : 1970  MRN: [id number]  Primary Care Physician: Chloe Barksdale MD  Date of admission: 2024        Attending   Admission Attestation       I have performed an independent face-to-face diagnostic evaluation including performing an independent physical examination.  I approve of the documented plan of care above that was reviewed and developed with the advanced practice clinician (APC) and take responsibility for that plan along with its associated risks.  I have updated the HPI as appropriate.    Brief HPI    53-year-old with history of Crohn's disease, recent laparoscopic resection of gastric diverticulum who presents with profuse watery diarrhea and electrolyte abnormalities, found to have potential new onset seizure in the ED which was witnessed by staff.    Attending Physical Exam:  Temp:  [98.5 °F (36.9 °C)] 98.5 °F (36.9 °C)  Heart Rate:  [82-92] 85  Resp:  [20] 20  BP: (122-143)/(80-97) 133/90  Awake alert and oriented x 3  Resting comfortably in bed  Mucous membranes moist  No cervical adenopathy or thyromegaly  Heart regular rate and rhythm  Lungs are clear to auscultation bilaterally  Abdomen soft, mild tenderness in the epigastrium without rebound, guarding, rigidity.  Incision sites are healing without fluctuance erythema or drainage.  No edema of lower extremities    Result Review:  I have personally reviewed the results from the time of this admission to 2024 04:08 EST and agree with these findings:  [x]  Laboratory list / accordion  []  Microbiology  [x]  Radiology  [x]  EKG/Telemetry   []  Cardiology/Vascular   []  Pathology  [x]  Old records  []  Other:  Most notable findings include: See assessment and plan    Assessment and Plan:    Vomiting, diarrhea-recent laparoscopic resection of gastric diverticulum, had been recovering well until few days ago.  No specific  findings on CT abdomen.  Given elevated white count and profuse diarrhea, exposure to healthcare setting multiple times in the past couple months we will test for C. difficile and GI panel.  History of Crohn's will add on ESR CRP.  Will consult Dr. Haji    Hypokalemia, hypomagnesemia, hyponatremia-suspect secondary to gastrointestinal fluid losses.  Correct as needed and monitor carefully.    Seizure?-Witnessed tonic-clonic event in the ED without aura or postictal period.  Highly suspect vasovagal episode as it occurred after placing IV.  Although electrolyte abnormalities and dehydration could have triggered seizure.  She has never had a seizure before.  To be complete, will obtain MRI brain to rule out structural cause, continue seizure precautions, and have neurology see her in the morning.    See assessment and plan documented by APC above and updated/edited by me as appropriate.    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 Haji on 1/18/2024, presents to the ED with complaints of nausea, vomiting, diarrhea, and abdominal pain.  Patient reports that she was doing well and had progressed to solid foods after her recent surgery.  Over the 2 days she has been experiencing severe nausea and vomiting.  She reports developing diarrhea last night that has continued throughout the day today.  She also endorses fatigue, dizziness, lightheadedness, and headaches.  She reports having epigastric pain that radiates around to both sides.  No fevers, chills, shortness of air, chest pain, or any other complaints at this time.  Patient reports that she is only had 1 glass of wine since her surgery.  Denies any history of alcohol abuse.   While in the ED patient had a witnessed seizure and was loaded with Keppra and Ativan.  Patient is being admitted to the hospitalist for further evaluation and management.    Review of Systems   Constitutional:  Positive for appetite change and fatigue. Negative for chills and fever.   Respiratory: Negative.     Cardiovascular: Negative.    Gastrointestinal:  Positive for abdominal pain, diarrhea, nausea and vomiting. Negative for abdominal distention and constipation.   Endocrine: Negative.    Genitourinary: Negative.    Musculoskeletal: Negative.    Skin: Negative.    Allergic/Immunologic: Negative.    Neurological:  Positive for dizziness, light-headedness and headaches.   Hematological: Negative.    Psychiatric/Behavioral: Negative.                  Personal History     Past Medical History:   Diagnosis Date    Angina pectoris     Asthma     Cancer     cervical    Crohn disease     Deaf, right     Heart disease     Hypertension     PONV (postoperative nausea and vomiting)              Past Surgical History:   Procedure Laterality Date    CHOLECYSTECTOMY      ENDOSCOPY N/A 12/26/2023    Procedure: ESOPHAGOGASTRODUODENOSCOPY;  Surgeon: Eh Pascal MD;  Location:  [facility];  Service: Gastroenterology;  Laterality: N/A;    NISSEN FUNDOPLICATION N/A 1/18/2024    Procedure: LAPAROSCOPIC RESECTION GASTRIC DIVERTICULUM, AND HILL FUNDOPLICATIO;  Surgeon: Vishal Haji MD;  Location:  [facility];  Service: General;  Laterality: N/A;    TUBAL ABDOMINAL LIGATION         Family History:  family history includes Breast cancer in her maternal grandmother and paternal aunt; Cancer in her maternal grandmother, maternal uncle, and paternal uncle; Heart attack in her cousin and maternal uncle; Hypertension in her mother; No Known Problems in her father and sister.     Social History:  reports that she has quit smoking. Her smoking use included electronic cigarette. She has been exposed to tobacco smoke. She has never  used smokeless tobacco. She reports that she does not currently use alcohol. She reports that she does not use drugs.  Social History     Social History Narrative    Not on file       Medications:  ALPRAZolam, HYDROcodone-acetaminophen, Vortioxetine HBr, albuterol sulfate HFA, busPIRone, cetirizine, cloNIDine, colestipol, docusate, fluticasone, hydrOXYzine pamoate, hydroCHLOROthiazide, lisinopril, montelukast, naloxone, ondansetron ODT, promethazine, propranolol, and sucralfate    Allergies   Allergen Reactions    Metoprolol Other (See Comments)     Pt reports no longer taking; reports that it caused heart rate to increase into 170's; and elevated SBP >200    Pt reports no longer taking; reports that it caused heart rate to increase into 170's; and elevated SBP >200       Objective   Objective     Vital Signs:   Temp:  [98.5 °F (36.9 °C)] 98.5 °F (36.9 °C)  Heart Rate:  [82-92] 85  Resp:  [20] 20  BP: (122-143)/(80-97) 133/90    Physical Exam   Constitutional: Awake, alert, resting in bed  Eyes: PERRLA, sclerae anicteric, no conjunctival injection  HENT: NCAT, mucous membranes moist  Neck: Supple, no thyromegaly, no lymphadenopathy, trachea midline  Respiratory: Clear to auscultation bilaterally, nonlabored respirations   Cardiovascular: RRR, no murmurs, rubs, or gallops, palpable pedal pulses bilaterally  Gastrointestinal: Positive bowel sounds, soft, epigastric tenderness, nondistended, lap sites clean, dry, intact  Musculoskeletal: No bilateral ankle edema, no clubbing or cyanosis to extremities  Psychiatric: Appropriate affect, cooperative  Neurologic: Oriented x 3, strength symmetric in all extremities, Cranial Nerves grossly intact to confrontation, speech clear  Skin: No rashes       Result Review:  I have personally reviewed the results from the time of this admission to 1/30/2024 02:18 EST and agree with these findings:  [x]  Laboratory list / accordion  []  Microbiology  [x]  Radiology  []  EKG/Telemetry    []  Cardiology/Vascular   []  Pathology  [x]  Old records  []  Other:  Most notable findings include:     LAB RESULTS:      Lab 01/30/24  0007 01/29/24 2045 01/29/24 2037   WBC  --   --  15.34*   HEMOGLOBIN  --   --  11.5*   HEMOGLOBIN, POC  --  12.2  --    HEMATOCRIT  --   --  33.2*   HEMATOCRIT POC  --  36*  --    PLATELETS  --   --  310   NEUTROS ABS  --   --  10.75*   IMMATURE GRANS (ABS)  --   --  0.10*   LYMPHS ABS  --   --  3.49*   MONOS ABS  --   --  0.76   EOS ABS  --   --  0.18   MCV  --   --  97.4*   LACTATE 0.9  --  3.8*         Lab 01/30/24  0055 01/29/24 2045 01/29/24 2037   SODIUM  --   --  129*   POTASSIUM  --   --  3.3*   CHLORIDE  --   --  88*   CO2  --   --  21.0*   ANION GAP  --   --  20.0*   BUN  --   --  7   CREATININE  --  1.00 1.05*   EGFR  --  67.1 63.3   GLUCOSE  --   --  118*   CALCIUM  --   --  8.5*   MAGNESIUM 1.0*  --   --          Lab 01/29/24 2037   TOTAL PROTEIN 7.3   ALBUMIN 4.2   GLOBULIN 3.1   ALT (SGPT) 11   AST (SGOT) 23   BILIRUBIN 0.6   ALK PHOS 83   LIPASE 52                     Brief Urine Lab Results  (Last result in the past 365 days)        Color   Clarity   Blood   Leuk Est   Nitrite   Protein   CREAT   Urine HCG        01/29/24 2029 Yellow   Clear   Negative   Negative   Negative   Negative                 Microbiology Results (last 10 days)       ** No results found for the last 240 hours. **            CT Abdomen Pelvis With Contrast    Result Date: 1/29/2024  CT ABDOMEN PELVIS W CONTRAST Date of Exam: 1/29/2024 10:58 PM EST Indication: Nausea, vomiting, diarrhea, abdominal pain.  Laparoscopic resection of gastric diverticulum on January 18. Comparison: 12/25/2023, 1/19/2024. Technique: Axial CT images were obtained of the abdomen and pelvis following the uneventful intravenous administration of intravenous contrast. Reconstructed coronal and sagittal images were also obtained. Automated exposure control and iterative construction methods were used. Findings:  Lung Bases:   The visualized lung bases and lower mediastinal structures are unremarkable. Liver: Liver is normal in size and CT density. No focal lesions. Biliary/Gallbladder:  The gallbladder has been resected. The biliary tree is nondilated. Spleen: Spleen is normal in size and CT density. Pancreas:  Pancreas is normal. There is no evidence of pancreatic mass or peripancreatic fluid. Kidneys:  Kidneys are normal in size. There are no stones or hydronephrosis. Adrenals:  Adrenal glands are unremarkable. Retroperitoneal/Lymph Nodes/Vasculature:  No retroperitoneal adenopathy is identified. Gastrointestinal/Mesentery:  Significant postsurgical changes are present within the stomach. Multiple clips are present. No abnormal free fluid or collection identified. No evidence of free air. Stomach appears normal in caliber. No significant stool burden. No evidence of hernia. The bowel loops are non-dilated without wall thickening or mass. The appendix appears within normal limits. No evidence of obstruction. No free air. No mesenteric fluid collections identified. Bladder:  Mild circumferential bladder wall thickening is present with suspected mild stranding within the adjacent fat. Limited evaluation due to under distention.. Genital:   Unremarkable       Bony Structures:   Visualized bony structures are consistent with the patient's age.     Impression: Impression: 1.No acute intra-abdominal or intrapelvic process. Significant postsurgical changes are present within the stomach. No evidence of obstruction. No abnormal free fluid or collection identified. 2.Mild circumferential bladder wall thickening with suspected mild stranding within the adjacent fat. Limited evaluation due to under distention. Recommend clinical correlation for findings of cystitis. 3.Ancillary findings as described above. Electronically Signed: Sonia Rider MD  1/29/2024 11:21 PM EST  Workstation ID: [id number]    CT Head Without Contrast    Result  Date: 1/29/2024  CT HEAD WO CONTRAST Date of Exam: 1/29/2024 10:58 PM EST Indication: Seizure. Comparison: 4/11/2017. Technique: Axial CT images were obtained of the head without contrast administration.  Automated exposure control and iterative construction methods were used. Findings: There is no evidence of hemorrhage. There is no mass effect or midline shift. There is no extracerebral collection. Ventricles are normal in size and configuration for patient's stated age.  Posterior fossa is within normal limits. Calvarium and skull base appear intact.   Visualized sinuses show no air fluid levels. Visualized orbits are unremarkable.     Impression: Impression: No acute intracranial process identified. Electronically Signed: Sonia Rider MD  1/29/2024 11:16 PM EST  Workstation ID: [id number]     Results for orders placed during the hospital encounter of 08/18/22    Adult Transthoracic Echo Complete W/ Cont if Necessary Per Protocol    Interpretation Summary  · Normal left ventricular cavity size and wall thickness noted.  · Left ventricular ejection fraction appears to be 61 - 65%.  · Estimated right ventricular systolic pressure from tricuspid regurgitation is normal (<35 mmHg).  · The aortic valve is structurally normal with no regurgitation or stenosis present.  · The mitral valve is structurally normal with no significant stenosis present. Mild mitral valve regurgitation is present.  · Mild tricuspid valve regurgitation is present. Estimated right ventricular systolic pressure from tricuspid regurgitation is normal (<35 mmHg).  · There is no evidence of pericardial effusion. .      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 Haji on 1/18/2024, presents to the ED with complaints of nausea, vomiting, diarrhea, and abdominal pain.      Assessment and plan:    Seizure  -- Was given Keppra and Ativan in ED  -- CT head shows no acute intracranial process  -- Seizure precautions  -- Consult neurology  -- EEG    Lactic acidosis--resolved  -- was given 2 liters of saline in the ED  -- Lactate 3.8 and 0.9    Hypomagnesemia  -- Mg 1.0  -- Replacement ordered  -- Recheck MG in a.m.    Hyponatremia  --   -- Urine sodium and urine osmolality  -- Serum osmolality  -- NS at 75 mL an hour  -- NA every 6 hours until stable    Hypokalemia  -- K +3.3  -- Sliding scale replacement  -- BMP in the a.m.    Leukocytosis  -- WBC 15.34  -- UA unremarkable    Abdominal pain with nausea, vomiting, diarrhea  Crohn's disease  Gastric diverticulum   -- s/p laparoscopic resection, he hill fundoplication, EGD with Dr. Haji on 1/18/2024  -- CT abdomen pelvis shows no acute intra-abdominal or intrapelvic process.  Significant postsurgical changes are present within the stomach, no evidence of obstruction, no abnormal free fluid or collection identified.  -Check inflammatory markers, C. difficile, GI panel  -- consult Dr. Haji  -- Antiemetics  -- IV fluids    HTN  -- continue lisinopril, HCTZ    Anxiety  -- Xanax, buspar, hydroxyzine      DVT prophylaxis: Mechanical    CODE STATUS:    Level Of Support Discussed With: Patient  Code Status (Patient has no pulse and is not breathing): CPR (Attempt to Resuscitate)  Medical Interventions (Patient has pulse or is breathing): Full Support      Expected Discharge pending clinical course (click hyperlink to enter date then refresh the note)  Expected discharge date/ time has not been documented.      This note has been completed as part of a split-shared workflow.     Signature: Electronically signed by VERONIQUE Reveles, 01/30/24, 2:18 AM  EST.

## 2024-01-30 NOTE — ED PROVIDER NOTES
EMERGENCY DEPARTMENT ENCOUNTER    Pt Name: Isi Bolton  MRN: [id number]  Pt :   1970  Room Number:  15/15  Date of encounter:  2024  PCP: Chloe Barksdale MD  ED Provider: BOLA Zhou    Historian: Patient    HPI:  Chief Complaint: Abdominal pain, nausea, vomiting, diarrhea    Context: Isi Bolton is a 53 y.o. female who presents to the ED c/o nausea, vomiting, abdominal pain and diarrhea.  Patient reports that she has been experiencing severe nausea and vomiting throughout the day today.  She reports persistent pain in her epigastric region that radiates like a band around to both sides.  Patient reports that she has had a recent colonic resection performed by Dr. Haji on 2024.  She states that she was doing well and had progressed from liquids to solid foods but now is having difficulty tolerating anything by mouth.  She reports no specific aggravating or alleviating factors with her symptoms.  She has not had a fever.  She states that she feels as if she is only urinated once today.  Her last episode of vomiting was approximately 45 minutes prior to presentation to the ED and states that she just vomited bile.  HPI     REVIEW OF SYSTEMS  A chief complaint appropriate review of systems was completed and is negative except as noted in the HPI.     PAST MEDICAL HISTORY  Past Medical History:   Diagnosis Date    Angina pectoris     Asthma     Cancer     cervical    Crohn disease     Deaf, right     Heart disease     Hypertension     PONV (postoperative nausea and vomiting)        PAST SURGICAL HISTORY  Past Surgical History:   Procedure Laterality Date    CHOLECYSTECTOMY      ENDOSCOPY N/A 2023    Procedure: ESOPHAGOGASTRODUODENOSCOPY;  Surgeon: Eh Pascal MD;  Location: [facility] ENDOSCOPY;  Service: Gastroenterology;  Laterality: N/A;    NISSEN FUNDOPLICATION N/A 2024    Procedure: LAPAROSCOPIC RESECTION GASTRIC DIVERTICULUM, AND HILL FUNDOPLICATIO;   Surgeon: Vishal Haji MD;  Location: [facility];  Service: General;  Laterality: N/A;    TUBAL ABDOMINAL LIGATION         FAMILY HISTORY  Family History   Problem Relation Age of Onset    Hypertension Mother     No Known Problems Father     No Known Problems Sister     Cancer Maternal Uncle     Heart attack Maternal Uncle     Breast cancer Paternal Aunt         50's    Cancer Paternal Uncle     Cancer Maternal Grandmother     Breast cancer Maternal Grandmother         70's    Heart attack Cousin        SOCIAL HISTORY  Social History     Socioeconomic History    Marital status: Single   Tobacco Use    Smoking status: Former     Types: Electronic Cigarette     Passive exposure: Past    Smokeless tobacco: Never   Vaping Use    Vaping Use: Former   Substance and Sexual Activity    Alcohol use: Not Currently    Drug use: No    Sexual activity: Defer       ALLERGIES  Metoprolol    PHYSICAL EXAM  Physical Exam  Vitals and nursing note reviewed.   Constitutional:       General: She is not in acute distress.     Appearance: Normal appearance. She is ill-appearing. She is not toxic-appearing.   HENT:      Head: Normocephalic and atraumatic.      Nose: Nose normal.      Mouth/Throat:      Mouth: Mucous membranes are moist.   Eyes:      Extraocular Movements: Extraocular movements intact.   Cardiovascular:      Rate and Rhythm: Normal rate and regular rhythm.   Pulmonary:      Effort: Pulmonary effort is normal.   Abdominal:      General: Bowel sounds are normal. There is no distension.      Palpations: Abdomen is soft.      Tenderness: There is generalized abdominal tenderness. There is guarding. There is no rebound.   Musculoskeletal:         General: Normal range of motion.      Cervical back: Normal range of motion and neck supple.   Skin:     General: Skin is warm and dry.   Neurological:      General: No focal deficit present.      Mental Status: She is alert.   Psychiatric:         Mood and Affect: Mood is anxious.          Behavior: Behavior normal.       LAB RESULTS  Results for orders placed or performed during the hospital encounter of 01/29/24   Comprehensive Metabolic Panel    Specimen: Blood   Result Value Ref Range    Glucose 118 (H) 65 - 99 mg/dL    BUN 7 6 - 20 mg/dL    Creatinine 1.05 (H) 0.57 - 1.00 mg/dL    Sodium 129 (L) 136 - 145 mmol/L    Potassium 3.3 (L) 3.5 - 5.2 mmol/L    Chloride 88 (L) 98 - 107 mmol/L    CO2 21.0 (L) 22.0 - 29.0 mmol/L    Calcium 8.5 (L) 8.6 - 10.5 mg/dL    Total Protein 7.3 6.0 - 8.5 g/dL    Albumin 4.2 3.5 - 5.2 g/dL    ALT (SGPT) 11 1 - 33 U/L    AST (SGOT) 23 1 - 32 U/L    Alkaline Phosphatase 83 39 - 117 U/L    Total Bilirubin 0.6 0.0 - 1.2 mg/dL    Globulin 3.1 gm/dL    A/G Ratio 1.4 g/dL    BUN/Creatinine Ratio 6.7 (L) 7.0 - 25.0    Anion Gap 20.0 (H) 5.0 - 15.0 mmol/L    eGFR 63.3 >60.0 mL/min/1.73   Lipase    Specimen: Blood   Result Value Ref Range    Lipase 52 13 - 60 U/L   Urinalysis With Microscopic If Indicated (No Culture) - Urine, Clean Catch    Specimen: Urine, Clean Catch   Result Value Ref Range    Color, UA Yellow Yellow, Straw    Appearance, UA Clear Clear    pH, UA 8.5 (H) 5.0 - 8.0    Specific Gravity, UA 1.006 1.001 - 1.030    Glucose, UA Negative Negative    Ketones, UA Trace (A) Negative    Bilirubin, UA Negative Negative    Blood, UA Negative Negative    Protein, UA Negative Negative    Leuk Esterase, UA Negative Negative    Nitrite, UA Negative Negative    Urobilinogen, UA 0.2 E.U./dL 0.2 - 1.0 E.U./dL   Lactic Acid, Plasma    Specimen: Blood   Result Value Ref Range    Lactate 3.8 (C) 0.5 - 2.0 mmol/L   CBC Auto Differential    Specimen: Blood   Result Value Ref Range    WBC 15.34 (H) 3.40 - 10.80 10*3/mm3    RBC 3.41 (L) 3.77 - 5.28 10*6/mm3    Hemoglobin 11.5 (L) 12.0 - 15.9 g/dL    Hematocrit 33.2 (L) 34.0 - 46.6 %    MCV 97.4 (H) 79.0 - 97.0 fL    MCH 33.7 (H) 26.6 - 33.0 pg    MCHC 34.6 31.5 - 35.7 g/dL    RDW 11.9 (L) 12.3 - 15.4 %    RDW-SD 41.7 37.0 -  54.0 fl    MPV 8.7 6.0 - 12.0 fL    Platelets 310 140 - 450 10*3/mm3    Neutrophil % 69.9 42.7 - 76.0 %    Lymphocyte % 22.8 19.6 - 45.3 %    Monocyte % 5.0 5.0 - 12.0 %    Eosinophil % 1.2 0.3 - 6.2 %    Basophil % 0.4 0.0 - 1.5 %    Immature Grans % 0.7 (H) 0.0 - 0.5 %    Neutrophils, Absolute 10.75 (H) 1.70 - 7.00 10*3/mm3    Lymphocytes, Absolute 3.49 (H) 0.70 - 3.10 10*3/mm3    Monocytes, Absolute 0.76 0.10 - 0.90 10*3/mm3    Eosinophils, Absolute 0.18 0.00 - 0.40 10*3/mm3    Basophils, Absolute 0.06 0.00 - 0.20 10*3/mm3    Immature Grans, Absolute 0.10 (H) 0.00 - 0.05 10*3/mm3    nRBC 0.0 0.0 - 0.2 /100 WBC   STAT Lactic Acid, Reflex    Specimen: Blood   Result Value Ref Range    Lactate 0.9 0.5 - 2.0 mmol/L   Magnesium    Specimen: Blood   Result Value Ref Range    Magnesium 1.0 (L) 1.6 - 2.6 mg/dL   Ethanol    Specimen: Blood   Result Value Ref Range    Ethanol <10 0 - 10 mg/dL   POC CHEM 8    Specimen: Blood   Result Value Ref Range    Glucose 109 70 - 130 mg/dL    BUN 7 (L) 8 - 26 mg/dL    Creatinine 1.00 0.60 - 1.30 mg/dL    Sodium 128 (L) 138 - 146 mmol/L    POC Potassium 3.2 (L) 3.5 - 4.9 mmol/L    Chloride 92 (L) 98 - 109 mmol/L    Total CO2 20 (L) 24 - 29 mmol/L    Hemoglobin 12.2 12.0 - 17.0 g/dL    Hematocrit 36 (L) 38 - 51 %    Ionized Calcium 1.05 (L) 1.20 - 1.32 mmol/L    eGFR 67.1 >60.0 mL/min/1.73   Green Top (Gel)   Result Value Ref Range    Extra Tube Hold for add-ons.    Lavender Top   Result Value Ref Range    Extra Tube hold for add-on    Gold Top - SST   Result Value Ref Range    Extra Tube Hold for add-ons.    Gray Top   Result Value Ref Range    Extra Tube Hold for add-ons.    Light Blue Top   Result Value Ref Range    Extra Tube Hold for add-ons.        If labs were ordered, I independently reviewed the results and considered them in treating the patient.    RADIOLOGY  CT Abdomen Pelvis With Contrast   Final Result   Impression:   1.No acute intra-abdominal or intrapelvic process.  Significant postsurgical changes are present within the stomach. No evidence of obstruction. No abnormal free fluid or collection identified.   2.Mild circumferential bladder wall thickening with suspected mild stranding within the adjacent fat. Limited evaluation due to under distention. Recommend clinical correlation for findings of cystitis.   3.Ancillary findings as described above.            Electronically Signed: Sonia Rider MD     1/29/2024 11:21 PM EST     Workstation ID: [id number]      CT Head Without Contrast   Final Result   Impression:   No acute intracranial process identified.            Electronically Signed: Sonia Rider MD     1/29/2024 11:16 PM EST     Workstation ID: [id number]        [x] Radiologist's Report Reviewed:  I ordered and independently interpreted the above noted radiographic studies.  See radiologist's dictation for official interpretation.      PROCEDURES    Procedures    No orders to display       MEDICATIONS GIVEN IN ER    Medications   Sodium Chloride (PF) 0.9 % 10 mL (has no administration in time range)   sodium chloride 0.9 % flush 10 mL (has no administration in time range)   Potassium Replacement - Follow Nurse / BPA Driven Protocol (has no administration in time range)   Magnesium Standard Dose Replacement - Follow Nurse / BPA Driven Protocol (has no administration in time range)   potassium chloride 10 mEq in 100 mL IVPB (10 mEq Intravenous New Bag 1/30/24 0108)   magnesium sulfate 2g/50 mL (PREMIX) infusion (has no administration in time range)     Followed by   magnesium sulfate 4g/100mL (PREMIX) infusion (has no administration in time range)   sodium chloride 0.9 % bolus 1,000 mL (0 mL Intravenous Stopped 1/29/24 2150)   ondansetron (ZOFRAN) injection 4 mg (4 mg Intravenous Given 1/29/24 2054)   famotidine (PEPCID) injection 20 mg (20 mg Intravenous Given 1/29/24 2053)   LORazepam (ATIVAN) injection 2 mg (2 mg Intravenous Given by Other 1/29/24 2042)   sodium chloride 0.9  % bolus 1,000 mL (0 mL Intravenous Stopped 1/30/24 0103)   iopamidol (ISOVUE-300) 61 % injection 100 mL (85 mL Intravenous Given 1/29/24 2313)   levETIRAcetam (KEPPRA) injection 1,000 mg (1,000 mg Intravenous Given 1/30/24 0007)       MEDICAL DECISION MAKING, PROGRESS, and CONSULTS   Medical Decision Making  Problems Addressed:  History of Crohn's disease: complicated acute illness or injury  Hyponatremia: complicated acute illness or injury  Nausea and vomiting, unspecified vomiting type: complicated acute illness or injury  New onset seizure: complicated acute illness or injury  Nonspecific abdominal pain: complicated acute illness or injury  Status post partial resection of colon: complicated acute illness or injury    Amount and/or Complexity of Data Reviewed  Labs: ordered. Decision-making details documented in ED Course.  Radiology: ordered.    Risk  OTC drugs.  Prescription drug management.  Decision regarding hospitalization.        All labs have been independently reviewed by me.  All radiology studies have been interpreted by me and the radiologist dictating the report.  All EKG's have been independently interpreted by me as well as and overseeing attending physician.    [] Discussed with radiology regarding test interpretation:    Discussion below represents my analysis of pertinent findings related to patient's condition, differential diagnosis, treatment plan and final disposition.    Differential diagnosis:  The differential diagnosis associated with the patient's presentation includes: Dyspepsia, viral gastroenteritis, constipation, medication side effects, irritable bowel syndrome, abdominal wall pain, gallbladder disease, pancreatitis, diverticulitis, appendicitis, kidney stone, UTI, hernia, gastroparesis, food poisoning, DKA, hepatitis, bowel obstruction, colitis, postsurgical complication and others.      Additional sources  Discussed/ obtained information from independent historians:   []  Spouse  [] Parent  [] Family member  [x] Friend  [] EMS   [] Other:  External (non-ED) record review:   [x] Inpatient record: Personally reviewed most recent surgical admission for laparoscopic resection   [] Office record:   [] Outpatient record:   [] Prior Outpatient labs:   [] Prior Outpatient radiology:   [] Primary Care record:   [] Outside ED record:   [] Other:   Patient's care impacted by:   [] Diabetes  [x] Hypertension  [] Hyperlipidemia  [] Hypothyroidism   [] Coronary Artery Disease   [] COPD   [] Cancer   [] Obesity  [] GERD   [] Tobacco Abuse   [] Substance Abuse    [x] Anxiety   [] Depression   [x] Other: History of Crohn's disease  Care significantly affected by Social Determinants of Health (housing and economic circumstances, unemployment)    [] Yes     [x] No   If yes, Patient's care significantly limited by  Social Determinants of Health including:   [] Inadequate housing   [] Low income   [] Alcoholism and drug addiction in family   [] Problems related to primary support group   [] Unemployment   [] Problems related to employment   [] Other Social Determinants of Health:     Shared decision making:  I reviewed workup performed in ED including labs and imaging. Based on findings, recommendation made for admission. Patient is agreeable to plan of care and hospital admission.      Orders placed during this visit:  Orders Placed This Encounter   Procedures    CT Abdomen Pelvis With Contrast    CT Head Without Contrast    Cobb Draw    Comprehensive Metabolic Panel    Lipase    Urinalysis With Microscopic If Indicated (No Culture) - Urine, Clean Catch    Lactic Acid, Plasma    CBC Auto Differential    STAT Lactic Acid, Reflex    Magnesium    Potassium    Ethanol    Magnesium    NPO Diet NPO Type: Strict NPO    Undress & Gown    POC CHEM 8    Insert Peripheral IV    Insert Peripheral IV    Initiate Observation Status    ED Bed Request    CBC & Differential    Green Top (Gel)    Lavender Top    Gold  Top - [facility]    Gray Top    Light Blue Top       ED Course:    ED Course as of 01/30/24 0147   Mon Jan 29, 2024 2041 Called to bedside as patient was experiencing seizure. [JG]   2111 Lactate(!!): 3.8 [JG]   2340 Family at bedside notes patient is a drinker with 2-3 drinks per day and has not had anything to drink in a few days.  [JG]   e Jan 30, 2024   0142 In summary this is a 53 year old female with history of Crohn's disease and recent laparoscopic resection of gastric diverticulum and Hill fundoplication who presents to the ER with symptoms of abdominal pain, nausea and vomiting.  Initial lactate of 3.8, sodium 128, potassium 3.2, magnesium 1.0 with leukocytosis of 15.34. CT scan of abdomen and pelvis personally interpreted by myself with official read provided by radiology demonstrates no acute or emergent abnormalities with postsurgical changes present.  Patient responded well to IV fluids with repeat lactate of 0.9.  On presentation to ED after being roomed in the emergency department, patient experienced a tonic-clonic seizure.  Was administered 2 mg of Ativan and loaded with Keppra.  Differential diagnosis includes new onset seizure, alcohol withdrawal as patient does have history of daily drinking.  CT of the head demonstrates no acute intracranial abnormalities.  Hospital medicine consulted for admission and agreeable to accept patient.  Patient agreeable to plan of care and hospital admission for further evaluation and treatment. [JG]      ED Course User Index  [JG] Jabier Iglesias PA            DIAGNOSIS  Final diagnoses:   New onset seizure   Nonspecific abdominal pain   Nausea and vomiting, unspecified vomiting type   Hyponatremia   Status post partial resection of colon   History of Crohn's disease   Hypomagnesemia   Lactic acidosis       DISPOSITION    ED Disposition       ED Disposition   Decision to Admit    Condition   --    Comment   Level of Care: Telemetry [5]   Diagnosis: Seizure [205090]    Admitting Physician: ABBI STEWART [[id number]]   Attending Physician: ABBI STEWART [[id number]]                 Please note that portions of this document were completed with voice recognition software.        Jabier Iglesias PA  01/30/24 0147

## 2024-01-31 LAB
ANION GAP SERPL CALCULATED.3IONS-SCNC: 12 MMOL/L (ref 5–15)
BUN SERPL-MCNC: 4 MG/DL (ref 6–20)
BUN/CREAT SERPL: 6.5 (ref 7–25)
CALCIUM SPEC-SCNC: 7.6 MG/DL (ref 8.6–10.5)
CHLORIDE SERPL-SCNC: 99 MMOL/L (ref 98–107)
CO2 SERPL-SCNC: 21 MMOL/L (ref 22–29)
CREAT SERPL-MCNC: 0.62 MG/DL (ref 0.57–1)
DEPRECATED RDW RBC AUTO: 45.3 FL (ref 37–54)
EGFRCR SERPLBLD CKD-EPI 2021: 106 ML/MIN/1.73
ERYTHROCYTE [DISTWIDTH] IN BLOOD BY AUTOMATED COUNT: 12.3 % (ref 12.3–15.4)
GLUCOSE SERPL-MCNC: 83 MG/DL (ref 65–99)
HCT VFR BLD AUTO: 27.8 % (ref 34–46.6)
HGB BLD-MCNC: 9.1 G/DL (ref 12–15.9)
MCH RBC QN AUTO: 33.6 PG (ref 26.6–33)
MCHC RBC AUTO-ENTMCNC: 32.7 G/DL (ref 31.5–35.7)
MCV RBC AUTO: 102.6 FL (ref 79–97)
PLATELET # BLD AUTO: 198 10*3/MM3 (ref 140–450)
PMV BLD AUTO: 8.9 FL (ref 6–12)
POTASSIUM SERPL-SCNC: 3.3 MMOL/L (ref 3.5–5.2)
POTASSIUM SERPL-SCNC: 4.9 MMOL/L (ref 3.5–5.2)
RBC # BLD AUTO: 2.71 10*6/MM3 (ref 3.77–5.28)
SODIUM SERPL-SCNC: 132 MMOL/L (ref 136–145)
WBC NRBC COR # BLD AUTO: 6.48 10*3/MM3 (ref 3.4–10.8)

## 2024-01-31 PROCEDURE — 94799 UNLISTED PULMONARY SVC/PX: CPT

## 2024-01-31 PROCEDURE — G0378 HOSPITAL OBSERVATION PER HR: HCPCS

## 2024-01-31 PROCEDURE — 84132 ASSAY OF SERUM POTASSIUM: CPT | Performed by: FAMILY MEDICINE

## 2024-01-31 PROCEDURE — 85027 COMPLETE CBC AUTOMATED: CPT | Performed by: FAMILY MEDICINE

## 2024-01-31 PROCEDURE — 99233 SBSQ HOSP IP/OBS HIGH 50: CPT | Performed by: FAMILY MEDICINE

## 2024-01-31 PROCEDURE — 63710000001 ONDANSETRON ODT 4 MG TABLET DISPERSIBLE: Performed by: NURSE PRACTITIONER

## 2024-01-31 PROCEDURE — 99213 OFFICE O/P EST LOW 20 MIN: CPT

## 2024-01-31 PROCEDURE — 25810000003 SODIUM CHLORIDE 0.9 % SOLUTION: Performed by: STUDENT IN AN ORGANIZED HEALTH CARE EDUCATION/TRAINING PROGRAM

## 2024-01-31 PROCEDURE — 80048 BASIC METABOLIC PNL TOTAL CA: CPT | Performed by: FAMILY MEDICINE

## 2024-01-31 RX ORDER — HYDROCHLOROTHIAZIDE 25 MG/1
25 TABLET ORAL DAILY
Status: DISCONTINUED | OUTPATIENT
Start: 2024-01-31 | End: 2024-02-01 | Stop reason: HOSPADM

## 2024-01-31 RX ORDER — VANCOMYCIN HYDROCHLORIDE 125 MG/1
125 CAPSULE ORAL EVERY 6 HOURS SCHEDULED
Status: DISCONTINUED | OUTPATIENT
Start: 2024-01-31 | End: 2024-02-01 | Stop reason: HOSPADM

## 2024-01-31 RX ORDER — POTASSIUM CHLORIDE 20 MEQ/1
40 TABLET, EXTENDED RELEASE ORAL EVERY 4 HOURS
Status: COMPLETED | OUTPATIENT
Start: 2024-01-31 | End: 2024-01-31

## 2024-01-31 RX ORDER — LISINOPRIL 20 MG/1
20 TABLET ORAL
Status: DISCONTINUED | OUTPATIENT
Start: 2024-01-31 | End: 2024-02-01 | Stop reason: HOSPADM

## 2024-01-31 RX ADMIN — CLONIDINE HYDROCHLORIDE 0.1 MG: 0.1 TABLET ORAL at 20:55

## 2024-01-31 RX ADMIN — ONDANSETRON 4 MG: 4 TABLET, ORALLY DISINTEGRATING ORAL at 10:07

## 2024-01-31 RX ADMIN — BUSPIRONE HYDROCHLORIDE 15 MG: 10 TABLET ORAL at 09:58

## 2024-01-31 RX ADMIN — ACETAMINOPHEN 650 MG: 325 TABLET ORAL at 12:22

## 2024-01-31 RX ADMIN — PROPRANOLOL HYDROCHLORIDE 40 MG: 20 TABLET ORAL at 20:56

## 2024-01-31 RX ADMIN — VANCOMYCIN HYDROCHLORIDE 125 MG: 125 CAPSULE ORAL at 17:06

## 2024-01-31 RX ADMIN — POTASSIUM CHLORIDE 40 MEQ: 1500 TABLET, EXTENDED RELEASE ORAL at 12:14

## 2024-01-31 RX ADMIN — POTASSIUM CHLORIDE 40 MEQ: 1500 TABLET, EXTENDED RELEASE ORAL at 09:59

## 2024-01-31 RX ADMIN — PROPRANOLOL HYDROCHLORIDE 40 MG: 20 TABLET ORAL at 09:58

## 2024-01-31 RX ADMIN — CLONIDINE HYDROCHLORIDE 0.1 MG: 0.1 TABLET ORAL at 09:59

## 2024-01-31 RX ADMIN — CHOLESTYRAMINE 4 G: 4 POWDER, FOR SUSPENSION ORAL at 09:59

## 2024-01-31 RX ADMIN — BUSPIRONE HYDROCHLORIDE 15 MG: 10 TABLET ORAL at 20:55

## 2024-01-31 RX ADMIN — HYDROCHLOROTHIAZIDE 25 MG: 25 TABLET ORAL at 17:05

## 2024-01-31 RX ADMIN — SUCRALFATE 1 G: 1 TABLET ORAL at 12:14

## 2024-01-31 RX ADMIN — VANCOMYCIN HYDROCHLORIDE 125 MG: 125 CAPSULE ORAL at 12:14

## 2024-01-31 RX ADMIN — SUCRALFATE 1 G: 1 TABLET ORAL at 17:06

## 2024-01-31 RX ADMIN — BUDESONIDE INHALATION 0.5 MG: 0.5 SUSPENSION RESPIRATORY (INHALATION) at 08:18

## 2024-01-31 RX ADMIN — SUCRALFATE 1 G: 1 TABLET ORAL at 20:55

## 2024-01-31 RX ADMIN — MONTELUKAST 10 MG: 10 TABLET, FILM COATED ORAL at 20:56

## 2024-01-31 RX ADMIN — ACETAMINOPHEN 650 MG: 325 TABLET ORAL at 18:26

## 2024-01-31 RX ADMIN — LISINOPRIL 20 MG: 20 TABLET ORAL at 17:06

## 2024-01-31 RX ADMIN — SODIUM CHLORIDE 50 ML/HR: 9 INJECTION, SOLUTION INTRAVENOUS at 03:41

## 2024-01-31 RX ADMIN — CETIRIZINE HYDROCHLORIDE 10 MG: 10 TABLET, FILM COATED ORAL at 09:59

## 2024-01-31 RX ADMIN — ALPRAZOLAM 1 MG: 1 TABLET ORAL at 20:56

## 2024-01-31 RX ADMIN — BUDESONIDE INHALATION 0.5 MG: 0.5 SUSPENSION RESPIRATORY (INHALATION) at 19:31

## 2024-01-31 NOTE — PROGRESS NOTES
[facility] Neurology    Progress Note    Patient Name: Isi Bolton  : 1970  MRN: [id number]  Primary Care Physician:  Chloe Barksdale MD  Date of admission: 2024    Subjective     Chief Complaint: Seizure-like activity    History of Present Illness   Patient was seen resting comfortably in bed.  No complaints overnight.  No seizure-like activity.    Review of Systems   General: Negative for fever, nausea, or vomiting.   Neurological: Negative for headache, pain, or weakness.     Objective     Physical Exam  Vitals and nursing note reviewed.   Constitutional:       General: She is not in acute distress.     Appearance: She is not ill-appearing.   Eyes:      Extraocular Movements: Extraocular movements intact.      Pupils: Pupils are equal, round, and reactive to light.      Comments: No nystagmus or deviated gaze noted   Cardiovascular:      Rate and Rhythm: Normal rate.   Neurological:      Mental Status: She is alert and oriented to person, place, and time.      Cranial Nerves: Cranial nerves 2-12 are intact.      Sensory: Sensation is intact.      Motor: Motor function is intact. No weakness or seizure activity.          Vitals:   Temp:  [97.9 °F (36.6 °C)-98.3 °F (36.8 °C)] 98 °F (36.7 °C)  Heart Rate:  [64-88] 70  Resp:  [16-20] 16  BP: (119-150)/() 131/87  Flow (L/min):  [2] 2    Current Medications    Current Facility-Administered Medications:     acetaminophen (TYLENOL) tablet 650 mg, 650 mg, Oral, Q4H PRN **OR** acetaminophen (TYLENOL) 160 MG/5ML oral solution 650 mg, 650 mg, Oral, Q4H PRN **OR** acetaminophen (TYLENOL) suppository 650 mg, 650 mg, Rectal, Q4H PRN, Jeannie Galaviz W, APRN    albuterol (PROVENTIL) nebulizer solution 0.083% 2.5 mg/3mL, 2.5 mg, Nebulization, Q4H PRN, Jeannie Galaviz, APRN    ALPRAZolam (XANAX) tablet 1 mg, 1 mg, Oral, BID PRN, Jeannie Galaviz APRN, 1 mg at 01/30/24 2109    budesonide (PULMICORT) nebulizer solution 0.5 mg, 0.5 mg,  Nebulization, BID - RT, Jeannie Galaviz APRN, 0.5 mg at 01/30/24 1919    busPIRone (BUSPAR) tablet 15 mg, 15 mg, Oral, Q12H, Jeannie Galaviz APRN, 15 mg at 01/30/24 2107    Calcium Replacement - Follow Nurse / BPA Driven Protocol, , Does not apply, PRN, Jeannie Galaviz APRN    cetirizine (zyrTEC) tablet 10 mg, 10 mg, Oral, Daily, Jeannie Galaviz APRN    cholestyramine light packet 4 g, 1 packet, Oral, Daily, Jeannie Galaviz APRN    cloNIDine (CATAPRES) tablet 0.1 mg, 0.1 mg, Oral, BID, Jeannie Galaviz APRN, 0.1 mg at 01/30/24 2107    Magnesium Standard Dose Replacement - Follow Nurse / BPA Driven Protocol, , Does not apply, PRN, Jenanie Galaviz APRN    montelukast (SINGULAIR) tablet 10 mg, 10 mg, Oral, Nightly, Jeannie Galaviz APRN, 10 mg at 01/30/24 2107    nitroglycerin (NITROSTAT) SL tablet 0.4 mg, 0.4 mg, Sublingual, Q5 Min PRN, Jeannie Galaviz APRN    ondansetron ODT (ZOFRAN-ODT) disintegrating tablet 4 mg, 4 mg, Oral, Q6H PRN, 4 mg at 01/30/24 2115 **OR** ondansetron (ZOFRAN) injection 4 mg, 4 mg, Intravenous, Q6H PRN, Jeannie Galaviz APRN, 4 mg at 01/30/24 0645    Phosphorus Replacement - Follow Nurse / BPA Driven Protocol, , Does not apply, PRN, Jeannie Galaviz APRN    potassium chloride (K-DUR,KLOR-CON) CR tablet 40 mEq, 40 mEq, Oral, Q4H, Lydia Keenan DO    Potassium Replacement - Follow Nurse / BPA Driven Protocol, , Does not apply, PRN, Jeannie Galaviz APRN    propranolol (INDERAL) tablet 40 mg, 40 mg, Oral, Q12H, Galaviz, Jeannie W, APRN, 40 mg at 01/30/24 2108    Sodium Chloride (PF) 0.9 % 10 mL, 10 mL, Intravenous, PRN, Jeannie Galaviz APRN    [COMPLETED] Insert Peripheral IV, , , Once **AND** sodium chloride 0.9 % flush 10 mL, 10 mL, Intravenous, PRN, Jabier Iglesias, PA    sodium chloride 0.9 % flush 10 mL, 10 mL, Intravenous, Q12H, Jeannie Galaviz APRN, 10 mL at 01/30/24 0956    sodium chloride 0.9 % flush  "10 mL, 10 mL, Intravenous, PRN, Jeannie Galaviz APRN    sodium chloride 0.9 % infusion 40 mL, 40 mL, Intravenous, PRN, Jeannie Galaviz APRN    sodium chloride 0.9 % infusion, 50 mL/hr, Intravenous, Continuous, Eh Sheffield MD, Last Rate: 50 mL/hr at 01/31/24 0544, 50 mL/hr at 01/31/24 0544    sucralfate (CARAFATE) tablet 1 g, 1 g, Oral, 4x Daily AC & at Bedtime, Jeannie Galaviz APRN, 1 g at 01/30/24 2108    vortioxetine (TRINTELLIX) tablet 20 mg - PATIENT SUPPLIED, 20 mg, Oral, Daily, Jeannie Galaviz APRN    Laboratory Results:   Lab Results   Component Value Date    GLUCOSE 83 01/31/2024    CALCIUM 7.6 (L) 01/31/2024     (L) 01/31/2024    K 3.3 (L) 01/31/2024    CO2 21.0 (L) 01/31/2024    CL 99 01/31/2024    BUN 4 (L) 01/31/2024    CREATININE 0.62 01/31/2024    EGFRIFNONA 84 04/06/2017    BCR 6.5 (L) 01/31/2024    ANIONGAP 12.0 01/31/2024     Lab Results   Component Value Date    WBC 6.48 01/31/2024    HGB 9.1 (L) 01/31/2024    HCT 27.8 (L) 01/31/2024    .6 (H) 01/31/2024     01/31/2024     Lab Results   Component Value Date    CHOL 205 (H) 08/21/2023    CHOL 186 07/11/2017     Lab Results   Component Value Date     (H) 08/21/2023    HDL 85 07/11/2017     Lab Results   Component Value Date    LDL 78 08/21/2023    LDL 89 07/11/2017     Lab Results   Component Value Date    TRIG 142 08/21/2023    TRIG 61 07/11/2017     Lab Results   Component Value Date    HGBA1C 5.20 01/17/2024     No results found for: \"INR\", \"PROTIME\"  Lab Results   Component Value Date    FOLATE 15.00 01/30/2024     Lab Results   Component Value Date    [id number] >2,000 (H) 01/30/2024       MRI Brain Without Contrast    Result Date: 1/30/2024  MRI BRAIN WO CONTRAST Date of Exam: 1/30/2024 7:40 AM EST Indication: Seizure, new-onset, no history of trauma.  Comparison: 4/11/2017. Technique:  Routine multiplanar/multisequence sequence images of the brain were obtained without contrast " administration. Findings: There are no areas of restricted diffusion. There are a few scattered foci of increased signal intensity in the right frontal and parietal lobes as best seen on the inversion recovery images. This has advanced only minimally since the prior study. There is no acute mass effect or edema. There is no acute CVA or hemorrhage. Ventricles are normal in size and shape and are midline. Structures of the posterior fossa and brainstem appear normal.    Impression: Impression: Evidence of mild chronic small vessel ischemic insult. No acute process. Electronically Signed: Adwoa Eller MD  1/30/2024 8:34 AM EST  Workstation ID: [id number]        Assessment / Plan     Brief Patient Summary:  Isi Bolton is a 53 y.o. female with a past medical history of Crohn's disease, HTN, GERD, anxiety, depression, gastric diverticulitis s/p laparoscopic resection, hill fundoplication (on 1/18/2024 with Dr. Haji) who presented to [facility] ED with complaint of nausea, vomiting, diarrhea and abdominal pain.      EEG revealed a normal study, with no focal features or epileptic activity seen.    MRI with no acute process, evidence of mild chronic small vessel ischemic changes.    Plan:   Seizure-like activity  EtOH use  Electrolyte imbalance  Given patient's negative neurologic workup and circumstances surrounding event.  It is likely patient experienced a vasovagal response rather than epileptic seizure.  Will hold off on the addition of AEM's at this time.  If patient has any witnessed seizure activity can load with 1 g of Keppra and Valium.  Vitamin B12 2000  Folate 15.00  Continue seizure and fall precautions  Continue CIWA protocol; PRNs as needed  Continue patient's home Xanax as needed for anxiety  Replace electrolytes per protocol  Continue patient's home BuSpar 15 mg twice daily  Neurochecks every 4 hours  General neurology has no further recommendations please refer your child the questions.    I have  discussed the above with the patient, bedside RN and Dr. Keenan  Time spent with patient: 35 minutes in face-to-face evaluation and management of the patient.    Copied text in this note has been reviewed and is accurate as of 01/31/24.     VERONIQUE Valentin

## 2024-01-31 NOTE — PLAN OF CARE
Goal Outcome Evaluation:      Patient doing well today. PO vanc started. VSS.

## 2024-01-31 NOTE — CASE MANAGEMENT/SOCIAL WORK
Continued Stay Note  [facility]     Patient Name: Isi Bolton  MRN: [id number]  Today's Date: 1/31/2024    Admit Date: 1/29/2024    Plan: Home   Discharge Plan       Row Name 01/31/24 1203       Plan    Plan Home    Patient/Family in Agreement with Plan yes    Plan Comments Spoke with patient at bedside. Denies any discharge needs at this time. CM will cont to follow.    Final Discharge Disposition Code 01 - home or self-care                   Discharge Codes    No documentation.                 Expected Discharge Date and Time       Expected Discharge Date Expected Discharge Time    Feb 2, 2024               Shayna Coleman RN

## 2024-01-31 NOTE — PROGRESS NOTES
"Patient Name:  Isi Bolton  YOB: 1970  [id number]    Surgery Progress Note    Date of visit: 1/31/2024    Subjective   Subjective: Feels OK. Still with some liquid stools. Denies N/V.         Objective     Objective:     /85 (BP Location: Right arm, Patient Position: Lying)   Pulse 68   Temp 98.2 °F (36.8 °C) (Oral)   Resp 18   Ht 165.1 cm (65\")   Wt 71.5 kg (157 lb 11.2 oz)   SpO2 96%   BMI 26.24 kg/m²   No intake or output data in the 24 hours ending 01/31/24 0710    CV:  Rhythm  regular and rate regular   L:  Clear  to auscultation bilaterally   Abd:  Bowel sounds positive , soft, nontender. Incisions c/d/i  Ext:  No cyanosis, clubbing, edema    Recent labs that are back at this time have been reviewed.            Assessment/ Plan:    Diarrhea- Unclear if C Diff infection vs colonization. May be reasonable given symptoms to treat regardless, but defer to ID physicians. No evidence of surgical complication. Will follow.      Problem List Items Addressed This Visit          Genitourinary and Reproductive     Hypomagnesemia    Lactic acidosis    Hyponatremia     Other Visit Diagnoses       New onset seizure    -  Primary    Nonspecific abdominal pain        Nausea and vomiting, unspecified vomiting type        Status post partial resection of colon        History of Crohn's disease                 Active Hospital Problems    Diagnosis  POA    **Seizure [R56.9]  Yes    Lactic acidosis [E87.20]  Unknown    Hypokalemia [E87.6]  Unknown    Hyponatremia [E87.1]  Unknown    ETOH abuse [F10.10]  Unknown    Hypomagnesemia [E83.42]  Yes    Inflammatory bowel disease (Crohn's disease) [K50.90]  Yes    Essential hypertension [I10]  Yes      Resolved Hospital Problems   No resolved problems to display.              Vishal Haji MD  1/31/2024  07:10 EST      "

## 2024-01-31 NOTE — CONSULTS
INFECTIOUS DISEASE CONSULT/INITIAL HOSPITAL VISIT    Isi Bolton  1970  [id number]    Date of Consult: 1/31/2024    Admission Date: 1/29/2024      Requesting Provider: No ref. provider found  Evaluating Physician: Farzad Au MD    Reason for Consultation: C. Difficile infection    History of present illness:    Patient is a 53 y.o. female  with a history of Crohn's disease and gastroesophageal reflux disease who underwent a recent  gastric diverticular resection and Hill Fundoplication on 1/18/24 who presented with persistent diarrhea complicated by a possible new onset seizure. She has a history of long-standing Crohn's disease followed by Dr. Morales. She had chronic symptoms of nausea and vomiting with multiple prior admissions for these symptoms.  She complains of diarrhea of 6-7 stools per day along with an episode of vomiting. She was afebrile at home and has been afebrile since the patient. GI PCR panel is negative.Her white blood cell count yesterday was 11.1  Her white blood cell count today is 6.48. She is employed as a nurse at [employer].     Past Medical History:   Diagnosis Date    Angina pectoris     Asthma     Cancer     cervical    Crohn disease     Deaf, right     Heart disease     Hypertension     PONV (postoperative nausea and vomiting)        Past Surgical History:   Procedure Laterality Date    CHOLECYSTECTOMY      ENDOSCOPY N/A 12/26/2023    Procedure: ESOPHAGOGASTRODUODENOSCOPY;  Surgeon: Eh Pascal MD;  Location: [facility] ENDOSCOPY;  Service: Gastroenterology;  Laterality: N/A;    NISSEN FUNDOPLICATION N/A 1/18/2024    Procedure: LAPAROSCOPIC RESECTION GASTRIC DIVERTICULUM, AND HILL FUNDOPLICATIO;  Surgeon: Vishal Haji MD;  Location: [facility] OR;  Service: General;  Laterality: N/A;    TUBAL ABDOMINAL LIGATION         Family History   Problem Relation Age of Onset    Hypertension Mother     No Known Problems Father     No Known Problems Sister      Cancer Maternal Uncle     Heart attack Maternal Uncle     Breast cancer Paternal Aunt         50's    Cancer Paternal Uncle     Cancer Maternal Grandmother     Breast cancer Maternal Grandmother         70's    Heart attack Cousin        Social History     Socioeconomic History    Marital status: Single   Tobacco Use    Smoking status: Former     Types: Electronic Cigarette     Passive exposure: Past    Smokeless tobacco: Never   Vaping Use    Vaping Use: Former   Substance and Sexual Activity    Alcohol use: Not Currently    Drug use: No    Sexual activity: Defer       Allergies   Allergen Reactions    Metoprolol Other (See Comments)     Pt reports no longer taking; reports that it caused heart rate to increase into 170's; and elevated SBP >200    Pt reports no longer taking; reports that it caused heart rate to increase into 170's; and elevated SBP >200         Medication:    Current Facility-Administered Medications:     acetaminophen (TYLENOL) tablet 650 mg, 650 mg, Oral, Q4H PRN, 650 mg at 01/31/24 1222 **OR** acetaminophen (TYLENOL) 160 MG/5ML oral solution 650 mg, 650 mg, Oral, Q4H PRN **OR** acetaminophen (TYLENOL) suppository 650 mg, 650 mg, Rectal, Q4H PRN, Jeannie Galaviz W, APRN    albuterol (PROVENTIL) nebulizer solution 0.083% 2.5 mg/3mL, 2.5 mg, Nebulization, Q4H PRN, Jeannie Galaviz, APRN    ALPRAZolam (XANAX) tablet 1 mg, 1 mg, Oral, BID PRN, Jeannie Galaviz, APRN, 1 mg at 01/30/24 2109    budesonide (PULMICORT) nebulizer solution 0.5 mg, 0.5 mg, Nebulization, BID - RT, Jeannie Galaviz, APRN, 0.5 mg at 01/31/24 0818    busPIRone (BUSPAR) tablet 15 mg, 15 mg, Oral, Q12H, Jeannie Galaviz, APRN, 15 mg at 01/31/24 0958    Calcium Replacement - Follow Nurse / BPA Driven Protocol, , Does not apply, PRN, Jeannie Galaviz, APRN    cetirizine (zyrTEC) tablet 10 mg, 10 mg, Oral, Daily, Jeannie Galaviz, APRN, 10 mg at 01/31/24 0959    cholestyramine light packet 4 g, 1 packet,  Oral, Daily, Jeannie Galaviz APRN, 4 g at 01/31/24 0959    cloNIDine (CATAPRES) tablet 0.1 mg, 0.1 mg, Oral, BID, Jeannie Galaviz APRN, 0.1 mg at 01/31/24 0959    hydroCHLOROthiazide (HYDRODIURIL) tablet 25 mg, 25 mg, Oral, Daily, Lydia Keenan, , 25 mg at 01/31/24 1705    lisinopril (PRINIVIL,ZESTRIL) tablet 20 mg, 20 mg, Oral, Q24H, Lydia Keenan DO, 20 mg at 01/31/24 1706    Magnesium Standard Dose Replacement - Follow Nurse / BPA Driven Protocol, , Does not apply, PRN, Jeannie Galaviz APRN    montelukast (SINGULAIR) tablet 10 mg, 10 mg, Oral, Nightly, Jeannie Galaviz APRN, 10 mg at 01/30/24 2107    nitroglycerin (NITROSTAT) SL tablet 0.4 mg, 0.4 mg, Sublingual, Q5 Min PRN, Jeannie Galaviz APRN    ondansetron ODT (ZOFRAN-ODT) disintegrating tablet 4 mg, 4 mg, Oral, Q6H PRN, 4 mg at 01/31/24 1007 **OR** ondansetron (ZOFRAN) injection 4 mg, 4 mg, Intravenous, Q6H PRN, Jeannie Galaviz APRN, 4 mg at 01/30/24 0645    Pharmacy Consult, , Does not apply, Continuous PRN, Lydia Keenan DO    Phosphorus Replacement - Follow Nurse / BPA Driven Protocol, , Does not apply, PRN, Jeannie Galaviz APRN    Potassium Replacement - Follow Nurse / BPA Driven Protocol, , Does not apply, PRN, Jeannie Galaviz APRN    propranolol (INDERAL) tablet 40 mg, 40 mg, Oral, Q12H, Jeannie Galaviz APRN, 40 mg at 01/31/24 0958    Sodium Chloride (PF) 0.9 % 10 mL, 10 mL, Intravenous, PRN, Jeannie Galaviz APRN    [COMPLETED] Insert Peripheral IV, , , Once **AND** sodium chloride 0.9 % flush 10 mL, 10 mL, Intravenous, PRN, Jabier Iglesias PA    sodium chloride 0.9 % flush 10 mL, 10 mL, Intravenous, Q12H, Jeannie Galaviz APRN, 10 mL at 01/30/24 0956    sodium chloride 0.9 % flush 10 mL, 10 mL, Intravenous, PRN, Jeannie Galaviz, APRN    sodium chloride 0.9 % infusion 40 mL, 40 mL, Intravenous, PRN, Jeannie Gaalviz, APRN    sucralfate (CARAFATE) tablet 1 g, 1  g, Oral, 4x Daily AC & at Bedtime, Jeannie Galaviz APRN, 1 g at 24 1706    vancomycin (VANCOCIN) capsule 125 mg, 125 mg, Oral, Q6H, Lydia Keenan DO, 125 mg at 24 1706    vortioxetine (TRINTELLIX) tablet 20 mg - PATIENT SUPPLIED, 20 mg, Oral, Daily, Jeannie Galaviz APRN, 20 mg at 24 1008    Antibiotics:  Anti-Infectives (From admission, onward)      Ordered     Dose/Rate Route Frequency Start Stop    24 0942  vancomycin (VANCOCIN) capsule 125 mg        Ordering Provider: Lydia Keenan DO    125 mg Oral Every 6 Hours Scheduled 24 1200 02/10/24 1159              Review of Systems:  Constitutional-- No Fever, chills or sweats.  Anorexia, malaise, and fatigue  HEENT-- No new vision, hearing or throat complaints. .  CV-- No chest pain, palpitation or syncope  Resp-- No SOB/cough.  GI- Nausea, vomiting, and diarrhea.  History of Crohn's disease.  History of GERD.  Status post recent surgery as noted above.  -- No dysuria, hematuria, or flank pain.  Heme- No active bruising or bleeding;   MS--  No new back pain.  Neuro-- No acute focal weakness or numbness in the arms or legs. Recent seizure-like activity.  Skin--No rashes or lesions      Physical Exam:   Vital Signs  Temp (24hrs), Av.2 °F (36.8 °C), Min:98 °F (36.7 °C), Max:98.6 °F (37 °C)    Temp  Min: 98 °F (36.7 °C)  Max: 98.6 °F (37 °C)  BP  Min: 119/88  Max: 150/105  Pulse  Min: 64  Max: 91  Resp  Min: 16  Max: 18  SpO2  Min: 80 %  Max: 100 %    GENERAL: Awake and alert, in no acute distress.   HEENT: Normocephalic, atraumatic.  PERRL. EOMI. No conjunctival injection. No icterus. Oropharynx clear without evidence of thrush or exudate.  NECK: Supple   HEART: RRR; No murmur, rubs, gallops.   LUNGS: Clear to auscultation bilaterally without wheezing, rales, rhonchi. Normal respiratory effort. Nonlabored. .  ABDOMEN: Soft, nontender, nondistended.  No rebound or guarding. NO mass or HSM.  EXT:  No cyanosis,  "clubbing or edema. No cord.  :  Without Mae catheter.  MSK: No joint effusions or erythema  SKIN: Warm and dry without cutaneous eruptions on Inspection/palpation.    NEURO: Oriented to PPT.  Motor 5/5 strength  PSYCHIATRIC: Normal insight and judgment. Cooperative with PE    Laboratory Data    Results from last 7 days   Lab Units 01/31/24  0326 01/30/24  0642 01/29/24  2045 01/29/24 2037   WBC 10*3/mm3 6.48 11.09*  --  15.34*   HEMOGLOBIN g/dL 9.1* 10.1*  --  11.5*   HEMOGLOBIN, POC g/dL  --   --  12.2  --    HEMATOCRIT % 27.8* 29.4*  --  33.2*   HEMATOCRIT POC %  --   --  36*  --    PLATELETS 10*3/mm3 198 257  --  310     Results from last 7 days   Lab Units 01/31/24  1645 01/31/24  0326   SODIUM mmol/L  --  132*   POTASSIUM mmol/L 4.9 3.3*   CHLORIDE mmol/L  --  99   CO2 mmol/L  --  21.0*   BUN mg/dL  --  4*   CREATININE mg/dL  --  0.62   GLUCOSE mg/dL  --  83   CALCIUM mg/dL  --  7.6*     Results from last 7 days   Lab Units 01/29/24 2037   ALK PHOS U/L 83   BILIRUBIN mg/dL 0.6   ALT (SGPT) U/L 11   AST (SGOT) U/L 23     Results from last 7 days   Lab Units 01/29/24  2037   SED RATE mm/hr 18     Results from last 7 days   Lab Units 01/30/24  0055   CRP mg/dL <0.30     Results from last 7 days   Lab Units 01/30/24  0007   LACTATE mmol/L 0.9             Estimated Creatinine Clearance: 102.8 mL/min (by C-G formula based on SCr of 0.62 mg/dL).      Microbiology:  No results found for: \"ACANTHNAEG\", \"AFBCX\", \"BPERTUSSISCX\", \"BLOODCX\"  No results found for: \"BCIDPCR\", \"CXREFLEX\", \"CSFCX\", \"CULTURETIS\"  No results found for: \"CULTURES\", \"HSVCX\", \"URCX\"  No results found for: \"EYECULTURE\", \"GCCX\", \"HSVCULTURE\", \"LABHSV\"  No results found for: \"LEGIONELLA\", \"MRSACX\", \"MUMPSCX\", \"MYCOPLASCX\"  No results found for: \"NOCARDIACX\", \"STOOLCX\"  No results found for: \"THROATCX\", \"UNSTIMCULT\", \"URINECX\", \"CULTURE\", \"VZVCULTUR\"  No results found for: \"VIRALCULTU\", \"WOUNDCX\"        Radiology:  Imaging Results (Last 72 Hours)  "      Procedure Component Value Units Date/Time    MRI Brain Without Contrast [[id number]] Collected: 01/30/24 0828     Updated: 01/30/24 0837    Narrative:      MRI BRAIN WO CONTRAST    Date of Exam: 1/30/2024 7:40 AM EST    Indication: Seizure, new-onset, no history of trauma.     Comparison: 4/11/2017.    Technique:  Routine multiplanar/multisequence sequence images of the brain were obtained without contrast administration.      Findings:  There are no areas of restricted diffusion. There are a few scattered foci of increased signal intensity in the right frontal and parietal lobes as best seen on the inversion recovery images. This has advanced only minimally since the prior study. There   is no acute mass effect or edema. There is no acute CVA or hemorrhage. Ventricles are normal in size and shape and are midline. Structures of the posterior fossa and brainstem appear normal.    Impression:      Impression:  Evidence of mild chronic small vessel ischemic insult. No acute process.        Electronically Signed: Adwoa Eller MD    1/30/2024 8:34 AM EST    Workstation ID: [id number]    CT Abdomen Pelvis With Contrast [[id number]] Collected: 01/29/24 2318     Updated: 01/29/24 2324    Narrative:      CT ABDOMEN PELVIS W CONTRAST    Date of Exam: 1/29/2024 10:58 PM EST    Indication: Nausea, vomiting, diarrhea, abdominal pain.  Laparoscopic resection of gastric diverticulum on January 18.    Comparison: 12/25/2023, 1/19/2024.    Technique: Axial CT images were obtained of the abdomen and pelvis following the uneventful intravenous administration of intravenous contrast. Reconstructed coronal and sagittal images were also obtained. Automated exposure control and iterative   construction methods were used.      Findings:  Lung Bases:     The visualized lung bases and lower mediastinal structures are unremarkable.    Liver:  Liver is normal in size and CT density. No focal lesions.    Biliary/Gallbladder:    The  gallbladder has been resected. The biliary tree is nondilated.    Spleen:  Spleen is normal in size and CT density.    Pancreas:    Pancreas is normal. There is no evidence of pancreatic mass or peripancreatic fluid.    Kidneys:    Kidneys are normal in size. There are no stones or hydronephrosis.    Adrenals:    Adrenal glands are unremarkable.    Retroperitoneal/Lymph Nodes/Vasculature:    No retroperitoneal adenopathy is identified.    Gastrointestinal/Mesentery:    Significant postsurgical changes are present within the stomach. Multiple clips are present. No abnormal free fluid or collection identified. No evidence of free air. Stomach appears normal in caliber. No significant stool burden. No evidence of hernia.   The bowel loops are non-dilated without wall thickening or mass. The appendix appears within normal limits. No evidence of obstruction. No free air. No mesenteric fluid collections identified.    Bladder:    Mild circumferential bladder wall thickening is present with suspected mild stranding within the adjacent fat. Limited evaluation due to under distention..    Genital:     Unremarkable          Bony Structures:     Visualized bony structures are consistent with the patient's age.        Impression:      Impression:  1.No acute intra-abdominal or intrapelvic process. Significant postsurgical changes are present within the stomach. No evidence of obstruction. No abnormal free fluid or collection identified.  2.Mild circumferential bladder wall thickening with suspected mild stranding within the adjacent fat. Limited evaluation due to under distention. Recommend clinical correlation for findings of cystitis.  3.Ancillary findings as described above.        Electronically Signed: Sonia Rider MD    1/29/2024 11:21 PM EST    Workstation ID: [id number]    CT Head Without Contrast [[id number]] Collected: 01/29/24 2314     Updated: 01/29/24 2319    Narrative:      CT HEAD WO CONTRAST    Date of Exam:  1/29/2024 10:58 PM EST    Indication: Seizure.    Comparison: 4/11/2017.    Technique: Axial CT images were obtained of the head without contrast administration.  Automated exposure control and iterative construction methods were used.      Findings:  There is no evidence of hemorrhage. There is no mass effect or midline shift.    There is no extracerebral collection.    Ventricles are normal in size and configuration for patient's stated age.      Posterior fossa is within normal limits.    Calvarium and skull base appear intact.   Visualized sinuses show no air fluid levels. Visualized orbits are unremarkable.      Impression:      Impression:  No acute intracranial process identified.        Electronically Signed: Sonia Rider MD    1/29/2024 11:16 PM EST    Workstation ID: [id number]              Impression:    C. Difficile infection-versus colonization.  The fact that her PCR was positive but the EIA toxin was negative suggest that this is colonization rather than true infection.  Still, I think it would be prudent to treat her with a course of oral vancomycin.  I discussed this complex situation in detail with her today.  Crohn's disease  Diarrhea-secondary to Crohn's disease versus C. Difficile infection  Electrolyte disturbances  Recurrent nausea and vomiting  Status post gastric diverticular resection/Hill fundoplication-1/18/24  New onset seizure-versus vasovagal response    PLAN/RECOMMENDATIONS:   Thank you for asking us to see Isi Bolton, I recommend the following:   Evaluation for possible seizures per neurology   Vancomycin 125 mg by mouth 4 times a day ×2 weeks followed by 125 mg by mouth twice a day ×2 weeks  Spore precautions for C. difficile     I discussed the epidemiology, biology, treatment, and prevention of C. Difficile infection with her in detail today.  Due to her underlying Crohn's disease, she will be at risk for protracted colonization with C. Difficile.    Farzad Au,  MD  1/31/2024  17:22 EST

## 2024-02-01 VITALS
HEIGHT: 65 IN | OXYGEN SATURATION: 93 % | SYSTOLIC BLOOD PRESSURE: 152 MMHG | BODY MASS INDEX: 26.27 KG/M2 | RESPIRATION RATE: 16 BRPM | WEIGHT: 157.7 LBS | TEMPERATURE: 98 F | HEART RATE: 76 BPM | DIASTOLIC BLOOD PRESSURE: 108 MMHG

## 2024-02-01 PROBLEM — F10.10 ETOH ABUSE: Status: RESOLVED | Noted: 2024-01-30 | Resolved: 2024-02-01

## 2024-02-01 PROBLEM — E87.6 HYPOKALEMIA: Status: RESOLVED | Noted: 2024-01-30 | Resolved: 2024-02-01

## 2024-02-01 PROBLEM — E87.20 LACTIC ACIDOSIS: Status: RESOLVED | Noted: 2024-01-30 | Resolved: 2024-02-01

## 2024-02-01 PROBLEM — E83.42 HYPOMAGNESEMIA: Status: RESOLVED | Noted: 2023-12-25 | Resolved: 2024-02-01

## 2024-02-01 PROBLEM — R56.9 SEIZURE: Status: RESOLVED | Noted: 2024-01-30 | Resolved: 2024-02-01

## 2024-02-01 PROBLEM — E87.1 HYPONATREMIA: Status: RESOLVED | Noted: 2024-01-30 | Resolved: 2024-02-01

## 2024-02-01 LAB
ANION GAP SERPL CALCULATED.3IONS-SCNC: 8 MMOL/L (ref 5–15)
BUN SERPL-MCNC: 5 MG/DL (ref 6–20)
BUN/CREAT SERPL: 6.9 (ref 7–25)
CALCIUM SPEC-SCNC: 9.2 MG/DL (ref 8.6–10.5)
CHLORIDE SERPL-SCNC: 104 MMOL/L (ref 98–107)
CO2 SERPL-SCNC: 25 MMOL/L (ref 22–29)
CREAT SERPL-MCNC: 0.72 MG/DL (ref 0.57–1)
DEPRECATED RDW RBC AUTO: 46.7 FL (ref 37–54)
EGFRCR SERPLBLD CKD-EPI 2021: 99.5 ML/MIN/1.73
ERYTHROCYTE [DISTWIDTH] IN BLOOD BY AUTOMATED COUNT: 12.4 % (ref 12.3–15.4)
GLUCOSE SERPL-MCNC: 103 MG/DL (ref 65–99)
HCT VFR BLD AUTO: 29.3 % (ref 34–46.6)
HGB BLD-MCNC: 9.6 G/DL (ref 12–15.9)
MCH RBC QN AUTO: 33.8 PG (ref 26.6–33)
MCHC RBC AUTO-ENTMCNC: 32.8 G/DL (ref 31.5–35.7)
MCV RBC AUTO: 103.2 FL (ref 79–97)
PLATELET # BLD AUTO: 223 10*3/MM3 (ref 140–450)
PMV BLD AUTO: 9.1 FL (ref 6–12)
POTASSIUM SERPL-SCNC: 5.6 MMOL/L (ref 3.5–5.2)
RBC # BLD AUTO: 2.84 10*6/MM3 (ref 3.77–5.28)
SODIUM SERPL-SCNC: 137 MMOL/L (ref 136–145)
WBC NRBC COR # BLD AUTO: 5.98 10*3/MM3 (ref 3.4–10.8)

## 2024-02-01 PROCEDURE — 63710000001 ONDANSETRON ODT 4 MG TABLET DISPERSIBLE: Performed by: NURSE PRACTITIONER

## 2024-02-01 PROCEDURE — G0378 HOSPITAL OBSERVATION PER HR: HCPCS

## 2024-02-01 PROCEDURE — 80048 BASIC METABOLIC PNL TOTAL CA: CPT | Performed by: FAMILY MEDICINE

## 2024-02-01 PROCEDURE — 94799 UNLISTED PULMONARY SVC/PX: CPT

## 2024-02-01 PROCEDURE — 94664 DEMO&/EVAL PT USE INHALER: CPT

## 2024-02-01 PROCEDURE — 99239 HOSP IP/OBS DSCHRG MGMT >30: CPT | Performed by: FAMILY MEDICINE

## 2024-02-01 PROCEDURE — 85027 COMPLETE CBC AUTOMATED: CPT | Performed by: FAMILY MEDICINE

## 2024-02-01 RX ORDER — VANCOMYCIN HYDROCHLORIDE 125 MG/1
CAPSULE ORAL
Qty: 84 CAPSULE | Refills: 0 | Status: SHIPPED | OUTPATIENT
Start: 2024-02-01 | End: 2024-02-01 | Stop reason: SDUPTHER

## 2024-02-01 RX ORDER — VANCOMYCIN HYDROCHLORIDE 125 MG/1
CAPSULE ORAL
Qty: 84 CAPSULE | Refills: 0 | Status: SHIPPED | OUTPATIENT
Start: 2024-02-01 | End: 2024-02-29

## 2024-02-01 RX ORDER — ONDANSETRON 4 MG/1
4 TABLET, ORALLY DISINTEGRATING ORAL EVERY 6 HOURS PRN
Qty: 90 TABLET | Refills: 0 | Status: SHIPPED | OUTPATIENT
Start: 2024-02-01

## 2024-02-01 RX ORDER — ONDANSETRON 4 MG/1
4 TABLET, ORALLY DISINTEGRATING ORAL EVERY 6 HOURS PRN
Qty: 90 TABLET | Refills: 0 | Status: SHIPPED | OUTPATIENT
Start: 2024-02-01 | End: 2024-02-01 | Stop reason: SDUPTHER

## 2024-02-01 RX ADMIN — VANCOMYCIN HYDROCHLORIDE 125 MG: 125 CAPSULE ORAL at 00:01

## 2024-02-01 RX ADMIN — CLONIDINE HYDROCHLORIDE 0.1 MG: 0.1 TABLET ORAL at 09:12

## 2024-02-01 RX ADMIN — ONDANSETRON 4 MG: 4 TABLET, ORALLY DISINTEGRATING ORAL at 06:45

## 2024-02-01 RX ADMIN — VANCOMYCIN HYDROCHLORIDE 125 MG: 125 CAPSULE ORAL at 06:43

## 2024-02-01 RX ADMIN — LISINOPRIL 20 MG: 20 TABLET ORAL at 09:12

## 2024-02-01 RX ADMIN — PROPRANOLOL HYDROCHLORIDE 40 MG: 20 TABLET ORAL at 09:12

## 2024-02-01 RX ADMIN — SUCRALFATE 1 G: 1 TABLET ORAL at 06:43

## 2024-02-01 RX ADMIN — HYDROCHLOROTHIAZIDE 25 MG: 25 TABLET ORAL at 09:12

## 2024-02-01 RX ADMIN — BUSPIRONE HYDROCHLORIDE 15 MG: 10 TABLET ORAL at 09:12

## 2024-02-01 RX ADMIN — CHOLESTYRAMINE 4 G: 4 POWDER, FOR SUSPENSION ORAL at 09:12

## 2024-02-01 RX ADMIN — BUDESONIDE INHALATION 0.5 MG: 0.5 SUSPENSION RESPIRATORY (INHALATION) at 09:20

## 2024-02-01 RX ADMIN — CETIRIZINE HYDROCHLORIDE 10 MG: 10 TABLET, FILM COATED ORAL at 09:12

## 2024-02-01 NOTE — PROGRESS NOTES
"                  Clinical Nutrition     Reason for Visit: Identified at risk by screening criteria      Patient Name: Isi Bolton  YOB: 1970  MRN: [id number]  Date of Encounter: 01/31/24 20:12 EST  Admission date: 1/29/2024      Nutrition Assessment   Admission Diagnosis:  Seizure [R56.9]      Problem List:    Seizure    Essential hypertension    Hypomagnesemia    Inflammatory bowel disease (Crohn's disease)    Lactic acidosis    Hypokalemia    Hyponatremia    ETOH abuse        PMH:   She  has a past medical history of Angina pectoris, Asthma, Cancer, Crohn disease, Deaf, right, Heart disease, Hypertension, and PONV (postoperative nausea and vomiting).    PSH:  She  has a past surgical history that includes Cholecystectomy; Tubal ligation; Esophagogastroduodenoscopy (N/A, 12/26/2023); and Nissen fundoplication (N/A, 1/18/2024).    Applicable Nutrition Concerns:   Skin:wnl  GI:abdominal pain, diarrhea    Reported/Observed/Food/Nutrition Related History:     Pt resting in bed, on nasal cannula, reports fair appetite, is having some abdominal pain and diarrhea s/p dinner, states that she had not had a Crohn's flare in a while until this past December and now this one, her UBW is ~ 144-146lb's, she feels it is hard to assess he actual weight because she feel she may hold on to some fluid    Pt does see gastroenterologist outpatient: Dr. Morales    Anthropometrics     Flowsheet Rows      Flowsheet Row First Filed Value   Admission Height 165.1 cm (65\") Documented at 01/29/2024 1926   Admission Weight 65.3 kg (144 lb) Documented at 01/29/2024 1926            Height: Height: 165.1 cm (65\")  Last Filed Weight: Weight: 71.5 kg (157 lb 11.2 oz) (01/30/24 0957)  Method: Weight Method: Bed scale  BMI: BMI (Calculated): 26.2  BMI classification: Overweight: 25.0-29.9kg/m2   IBW:  125lb    UBW:  144-146lb    Weight change:  ? 11lbfluid gain     Weight       Weight (kg) Weight (lbs) Weight Method Visit Report "   1/31/2017 59.603 kg  131 lb 6.4 oz   --    2/15/2017 60.328 kg  133 lb      10/17/2022 66.497 kg  146 lb 9.6 oz   --     66.407 kg  146 lb 6.4 oz   --    11/17/2022 62.959 kg  138 lb 12.8 oz   --    4/13/2023 62.687 kg  138 lb 3.2 oz  Standing scale     5/1/2023 61.689 kg  136 lb   --    6/13/2023 61.236 kg  135 lb   --    7/14/2023 62.596 kg  138 lb   --    10/17/2023 63.504 kg  140 lb  Stated     12/25/2023 65.772 kg  145 lb  Stated      64.638 kg  142 lb 8 oz  Bed scale      64.6 kg  142 lb 6.7 oz      1/17/2024 66.3 kg  146 lb 2.6 oz  Standing scale     1/18/2024 66.3 kg  146 lb 2.6 oz      1/29/2024 65.318 kg  144 lb      1/30/2024 71.532 kg  157 lb 11.2 oz  Bed scale           Nutrition Focused Physical Exam     Date:         Unable to perform exam due to: Potential for patient discomfort      Current Nutrition Prescription   PO: Diet: Gastrointestinal Diets; Fiber-Restricted, Low Irritant; Texture: Regular Texture (IDDSI 7); Fluid Consistency: Thin (IDDSI 0)  Oral Nutrition Supplement:   Intake: Insufficient data 75% of 1 clear meal      Nutrition Diagnosis   Date:   1-31-24           Updated:    Problem Altered GI function   Etiology Crohn's disease   Signs/Symptoms Nausea, vomiting, diarrhea, abdominal pain       Goal:   General: Nutrition support treatment  PO: Tolerate PO  EN/PN: N/A    Nutrition Intervention      Follow treatment progress, Care plan reviewed, Interview for preferences, Menu adjusted, Supplement provided    Add Boost Breeze 3x/day    Yogurt each am    Telephone provided to pt as she did not have one installed in her room    Monitoring/Evaluation:   Per protocol, I&O, PO intake, Pertinent labs, Weight, Skin status, GI status, Symptoms    Gay Wilson, TERESO  Time Spent: 30min

## 2024-02-01 NOTE — PROGRESS NOTES
"Patient Name:  Isi Bolton  YOB: 1970  [id number]    Surgery Progress Note    Date of visit: 2/1/2024    Subjective   Subjective: Complains of some nausea, but tolerating diet, still having BM's.         Objective     Objective:     BP (!) 151/103 (BP Location: Right arm, Patient Position: Lying)   Pulse 74   Temp 97.9 °F (36.6 °C) (Oral)   Resp 18   Ht 165.1 cm (65\")   Wt 71.5 kg (157 lb 11.2 oz)   SpO2 96%   BMI 26.24 kg/m²     Intake/Output Summary (Last 24 hours) at 2/1/2024 0725  Last data filed at 2/1/2024 0600  Gross per 24 hour   Intake 850 ml   Output --   Net 850 ml       CV:  Rhythm  regular and rate regular   L:  Clear  to auscultation bilaterally   Abd:  Bowel sounds positive , soft, nontender  Ext:  No cyanosis, clubbing, edema    Recent labs that are back at this time have been reviewed.            Assessment/ Plan:    Problem List Items Addressed This Visit          Genitourinary and Reproductive     Hypomagnesemia    Lactic acidosis    Hyponatremia     Other Visit Diagnoses       New onset seizure    -  Primary    Nonspecific abdominal pain        Nausea and vomiting, unspecified vomiting type   - I suspect her nausea is chronic, and is the same issue that has been persistent over many years.  She has had both cholecystectomy as well as her recent resection of her gastric diverticulum to try and address this, neither of which have been entirely successful.  Certainly her Crohn's disease could be playing a role as well.  I do not see any surgical interventions to perform.  From a C. difficile standpoint, I agree with Dr. Olivares of continuing her oral regimen.  From my standpoint can be discharged home whenever medically ready.  Follow-up as already scheduled with me in 2 weeks in the office.      Status post partial resection of colon        History of Crohn's disease                 Active Hospital Problems    Diagnosis  POA    **Seizure [R56.9]  Yes    Lactic " acidosis [E87.20]  Unknown    Hypokalemia [E87.6]  Unknown    Hyponatremia [E87.1]  Unknown    ETOH abuse [F10.10]  Unknown    Hypomagnesemia [E83.42]  Yes    Inflammatory bowel disease (Crohn's disease) [K50.90]  Yes    Essential hypertension [I10]  Yes      Resolved Hospital Problems   No resolved problems to display.              Vishal Haji MD  2/1/2024  07:25 EST

## 2024-02-01 NOTE — DISCHARGE SUMMARY
[facility]  DISCHARGE SUMMARY    Patient Name: Isi Bolton  : 1970  MRN: [id number]    Date of Admission: 2024  7:50 PM  Date of Discharge:  2024  Primary Care Physician: Chloe Barksdale MD    Consults       Date and Time Order Name Status Description    2024 10:28 AM Inpatient Infectious Diseases Consult Completed     2024  6:07 AM Inpatient Neurology Consult General Completed     2024  6:07 AM Inpatient General Surgery Consult Completed             Hospital Course       Active Hospital Problems    Diagnosis  POA    Inflammatory bowel disease (Crohn's disease) [K50.90]  Yes    Essential hypertension [I10]  Yes      Resolved Hospital Problems    Diagnosis Date Resolved POA    **Seizure [R56.9] 2024 Yes    Lactic acidosis [E87.20] 2024 Unknown    Hypokalemia [E87.6] 2024 Unknown    Hyponatremia [E87.1] 2024 Unknown    ETOH abuse [F10.10] 2024 Unknown    Hypomagnesemia [E83.42] 2024 Yes          Hospital Course:  Isi Bolton is a 53 y.o. female with a past medical history of Crohn's disease, HTN, GERD, anxiety, depression, gastric diverticulitis s/p laparoscopic resection, hill fundoplication (on 2024 with Dr. Haji) who presented to [facility] ED with complaint of nausea, vomiting, diarrhea and abdominal pain.  Patient endorses fatigue, dizziness, lightheadedness and headache.  Patient endorses after surgery she had been progressing her food appropriately complaint.  However over the past 2 days she has been experiencing severe nausea and vomiting.  She has also developed diarrhea.  She has complaint of epigastric pain.  Patient states that she does not frequently consume alcohol.  She states her last drink was on 24.       While in the ED there was concern for possible seizure-like activity and patient was loaded with Keppra and Ativan.  It is unclear if event was witnessed by medical  professionals.  Per patient she did not lose control of bowel or bladder.  No tongue bite is noted.  She states she is unable to recall exact details of the event.  She states her boyfriend witnessed the event.  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  Patient denies any aura prior to event or postictal phase.  Patient denies any previous seizure history     Seizure-like activity   --Discussed with Neurology, not entirely convinced this was a seizure  --EEG WNL  --MRI brain WNL   --Episode likely 2/2 to acute illness, electrolyte derangements, dehydration  --No AEM's at this time     Lactic acidosis--resolved  -- S/P IVF     Hypomagnesemia  --Resolved      Hyponatremia  -- Resolved      Hypokalemia  --Resolved      Leukocytosis  -- Trended down, no left shift      Abdominal pain with nausea, vomiting, diarrhea  Crohn's disease  Gastric diverticulum   -- s/p laparoscopic resection, hill fundoplication, EGD with Dr. Haji on 1/18/2024  -- CT abdomen pelvis shows no acute intra-abdominal or intrapelvic process.  Significant postsurgical changes are present within the stomach, no evidence of obstruction, no abnormal free fluid or collection identified.  -- Dr Haji evaluated, felt likely 2/2 to her Crohn's   -- GI PCR negative  -- C diff toxin + but Ag negative. 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 Never been treated for it. No recent ABX except one dose of Cefazolin prior to surgery on 1/18.   -- ID evaluated, due to Sx, plan to treat with oral Vancomycin 125mg PO q 6 hours x2 weeks then 125mg PO BID x 2 weeks   --Follow-up with Dr Au 1 month      HTN  -- continue home lisinopril, HCTZ     Anxiety  -- Xanax, buspar, hydroxyzine    Discharge Follow Up Recommendations for outpatient labs/diagnostics:  -PCP 1 week   -Dr Haji 2 weeks as already scheduled  -Dr ASHLEY Au 1 month     Day of Discharge     HPI:   Patient seen  and examined. Feeling some better. Still with some diarrhea. Intake slowly improving.     Review of Systems  Gen- No fevers, chills  CV- No chest pain, palpitations  Resp- No cough, dyspnea  GI- No N/V/D, abd pain    Vital Signs:   Temp:  [97.9 °F (36.6 °C)-98.8 °F (37.1 °C)] 98 °F (36.7 °C)  Heart Rate:  [64-96] 80  Resp:  [16-18] 16  BP: (137-152)/() 152/108  Flow (L/min):  [2] 2      Physical Exam:  Constitutional: No acute distress, awake, alert  HENT: NCAT, mucous membranes moist  Respiratory: Clear to auscultation bilaterally, respiratory effort normal   Cardiovascular: RRR, no murmurs, rubs, or gallops  Gastrointestinal: Positive bowel sounds, soft, nontender, nondistended  Musculoskeletal: No bilateral ankle edema  Psychiatric: Appropriate affect, cooperative  Neurologic: Oriented x 3, DEAN, speech clear  Skin: No rashes     Pertinent  and/or Most Recent Results     LAB RESULTS:      Lab 02/01/24  0458 01/31/24  0326 01/30/24  0642 01/30/24  0055 01/30/24  0007 01/29/24  2045 01/29/24 2037   WBC 5.98 6.48 11.09*  --   --   --  15.34*   HEMOGLOBIN 9.6* 9.1* 10.1*  --   --   --  11.5*   HEMOGLOBIN, POC  --   --   --   --   --  12.2  --    HEMATOCRIT 29.3* 27.8* 29.4*  --   --   --  33.2*   HEMATOCRIT POC  --   --   --   --   --  36*  --    PLATELETS 223 198 257  --   --   --  310   NEUTROS ABS  --   --  8.59*  --   --   --  10.75*   IMMATURE GRANS (ABS)  --   --  0.05  --   --   --  0.10*   LYMPHS ABS  --   --  1.76  --   --   --  3.49*   MONOS ABS  --   --  0.59  --   --   --  0.76   EOS ABS  --   --  0.07  --   --   --  0.18   .2* 102.6* 99.7*  --   --   --  97.4*   SED RATE  --   --   --   --   --   --  18   CRP  --   --   --  <0.30  --   --   --    LACTATE  --   --   --   --  0.9  --  3.8*         Lab 02/01/24  0458 01/31/24  1645 01/31/24  0326 01/30/24  2333 01/30/24  1216 01/30/24  0642 01/30/24  0055 01/29/24 2045 01/29/24 2037   SODIUM 137  --  132*  --   --  135* 130*  --  129*    POTASSIUM 5.6* 4.9 3.3*  --   --  4.1  --   --  3.3*   CHLORIDE 104  --  99  --   --  99  --   --  88*   CO2 25.0  --  21.0*  --   --  20.0*  --   --  21.0*   ANION GAP 8.0  --  12.0  --   --  16.0*  --   --  20.0*   BUN 5*  --  4*  --   --  6  --   --  7   CREATININE 0.72  --  0.62  --   --  0.89  --  1.00 1.05*   EGFR 99.5  --  106.0  --   --  77.2  --  67.1 63.3   GLUCOSE 103*  --  83  --   --  106*  --   --  118*   CALCIUM 9.2  --  7.6*  --   --  7.7*  --   --  8.5*   IONIZED CALCIUM  --   --   --   --  1.09*  --   --   --   --    MAGNESIUM  --   --   --  2.0  --  2.8* 1.0*  --   --          Lab 01/29/24 2037   TOTAL PROTEIN 7.3   ALBUMIN 4.2   GLOBULIN 3.1   ALT (SGPT) 11   AST (SGOT) 23   BILIRUBIN 0.6   ALK PHOS 83   LIPASE 52                 Lab 01/30/24  1444   FOLATE 15.00   VITAMIN B 12 >2,000*         Brief Urine Lab Results  (Last result in the past 365 days)        Color   Clarity   Blood   Leuk Est   Nitrite   Protein   CREAT   Urine HCG        01/29/24 2029 Yellow   Clear   Negative   Negative   Negative   Negative                 Microbiology Results (last 10 days)       Procedure Component Value - Date/Time    Gastrointestinal Panel, PCR - Stool, Per Rectum [[id number]]  (Normal) Collected: 01/30/24 0720    Lab Status: Final result Specimen: Stool from Per Rectum Updated: 01/30/24 0981     Campylobacter Not Detected     Plesiomonas shigelloides Not Detected     Salmonella Not Detected     Vibrio Not Detected     Vibrio cholerae Not Detected     Yersinia enterocolitica Not Detected     Enteroaggregative E. coli (EAEC) Not Detected     Enteropathogenic E. coli (EPEC) Not Detected     Enterotoxigenic E. coli (ETEC) lt/st Not Detected     Shiga-like toxin-producing E. coli (STEC) stx1/stx2 Not Detected     Shigella/Enteroinvasive E. coli (EIEC) Not Detected     Cryptosporidium Not Detected     Cyclospora cayetanensis Not Detected     Entamoeba histolytica Not Detected     Giardia lamblia Not Detected      Adenovirus F40/41 Not Detected     Astrovirus Not Detected     Norovirus GI/GII Not Detected     Rotavirus A Not Detected     Sapovirus (I, II, IV or V) Not Detected    Clostridioides difficile Toxin - Stool, Per Rectum [[id number]]  (Abnormal) Collected: 01/30/24 0627    Lab Status: Final result Specimen: Stool from Per Rectum Updated: 01/30/24 0848    Narrative:      The following orders were created for panel order Clostridioides difficile Toxin - Stool, Per Rectum.  Procedure                               Abnormality         Status                     ---------                               -----------         ------                     Clostridioides difficile...[[id number]]  Abnormal            Final result                 Please view results for these tests on the individual orders.    Clostridioides difficile Toxin, PCR - Stool, Per Rectum [[id number]]  (Abnormal) Collected: 01/30/24 0627    Lab Status: Final result Specimen: Stool from Per Rectum Updated: 01/30/24 0848     Toxigenic C. difficile by PCR Detected    Narrative:      DNA from a toxigenic strain of C.difficile has been detected.    Clostridioides difficile toxin Ag, Reflex - Stool, Per Rectum [[id number]]  (Normal) Collected: 01/30/24 0627    Lab Status: Final result Specimen: Stool from Per Rectum Updated: 01/30/24 0913     C.diff Toxin Ag Negative    Narrative:      DNA from a toxigenic strain of C.difficile was detected, although the free toxin itself was not detected. These findings are consistent with C.difficile colonization and may not reflect actual C.difficile infection. Clinical correlation needed.            EEG    Result Date: 1/30/2024  Reason for referral: 53 y.o.female with seizure Technical Summary:  A 19 channel digital EEG was performed using the international 10-20 placement system, including eye leads and EKG leads. Duration: 20 minutes Findings: The patient is awake.  Diffuse low amplitude alpha activity is present  symmetrically over both hemispheres.  EMG and eye blink artifact are seen anteriorly.  At times a 12 Hz posterior rhythm is evident symmetrically over the occipital leads.  Sleep is not seen.  Photic stimulation yields a symmetric driving response.  No focal features or epileptiform activity are present. Video: Available Technical quality: Superior EKG: Regular, 80 bpm SUMMARY: Normal EEG in the awake state No focal features or epileptiform activity are seen     Normal study This report is transcribed using the Dragon dictation system.      MRI Brain Without Contrast    Result Date: 1/30/2024  MRI BRAIN WO CONTRAST Date of Exam: 1/30/2024 7:40 AM EST Indication: Seizure, new-onset, no history of trauma.  Comparison: 4/11/2017. Technique:  Routine multiplanar/multisequence sequence images of the brain were obtained without contrast administration. Findings: There are no areas of restricted diffusion. There are a few scattered foci of increased signal intensity in the right frontal and parietal lobes as best seen on the inversion recovery images. This has advanced only minimally since the prior study. There is no acute mass effect or edema. There is no acute CVA or hemorrhage. Ventricles are normal in size and shape and are midline. Structures of the posterior fossa and brainstem appear normal.    Impression: Evidence of mild chronic small vessel ischemic insult. No acute process. Electronically Signed: Adwoa Eller MD  1/30/2024 8:34 AM EST  Workstation ID: [id number]    CT Abdomen Pelvis With Contrast    Result Date: 1/29/2024  CT ABDOMEN PELVIS W CONTRAST Date of Exam: 1/29/2024 10:58 PM EST Indication: Nausea, vomiting, diarrhea, abdominal pain.  Laparoscopic resection of gastric diverticulum on January 18. Comparison: 12/25/2023, 1/19/2024. Technique: Axial CT images were obtained of the abdomen and pelvis following the uneventful intravenous administration of intravenous contrast. Reconstructed coronal and  sagittal images were also obtained. Automated exposure control and iterative construction methods were used. Findings: Lung Bases:   The visualized lung bases and lower mediastinal structures are unremarkable. Liver: Liver is normal in size and CT density. No focal lesions. Biliary/Gallbladder:  The gallbladder has been resected. The biliary tree is nondilated. Spleen: Spleen is normal in size and CT density. Pancreas:  Pancreas is normal. There is no evidence of pancreatic mass or peripancreatic fluid. Kidneys:  Kidneys are normal in size. There are no stones or hydronephrosis. Adrenals:  Adrenal glands are unremarkable. Retroperitoneal/Lymph Nodes/Vasculature:  No retroperitoneal adenopathy is identified. Gastrointestinal/Mesentery:  Significant postsurgical changes are present within the stomach. Multiple clips are present. No abnormal free fluid or collection identified. No evidence of free air. Stomach appears normal in caliber. No significant stool burden. No evidence of hernia. The bowel loops are non-dilated without wall thickening or mass. The appendix appears within normal limits. No evidence of obstruction. No free air. No mesenteric fluid collections identified. Bladder:  Mild circumferential bladder wall thickening is present with suspected mild stranding within the adjacent fat. Limited evaluation due to under distention.. Genital:   Unremarkable       Bony Structures:   Visualized bony structures are consistent with the patient's age.     Impression: 1.No acute intra-abdominal or intrapelvic process. Significant postsurgical changes are present within the stomach. No evidence of obstruction. No abnormal free fluid or collection identified. 2.Mild circumferential bladder wall thickening with suspected mild stranding within the adjacent fat. Limited evaluation due to under distention. Recommend clinical correlation for findings of cystitis. 3.Ancillary findings as described above. Electronically Signed:  Sonia Rider MD  1/29/2024 11:21 PM EST  Workstation ID: [id number]    CT Head Without Contrast    Result Date: 1/29/2024  CT HEAD WO CONTRAST Date of Exam: 1/29/2024 10:58 PM EST Indication: Seizure. Comparison: 4/11/2017. Technique: Axial CT images were obtained of the head without contrast administration.  Automated exposure control and iterative construction methods were used. Findings: There is no evidence of hemorrhage. There is no mass effect or midline shift. There is no extracerebral collection. Ventricles are normal in size and configuration for patient's stated age.  Posterior fossa is within normal limits. Calvarium and skull base appear intact.   Visualized sinuses show no air fluid levels. Visualized orbits are unremarkable.     Impression: No acute intracranial process identified. Electronically Signed: Sonia Rider MD  1/29/2024 11:16 PM EST  Workstation ID: [id number]             Results for orders placed during the hospital encounter of 08/18/22    Adult Transthoracic Echo Complete W/ Cont if Necessary Per Protocol    Interpretation Summary  · Normal left ventricular cavity size and wall thickness noted.  · Left ventricular ejection fraction appears to be 61 - 65%.  · Estimated right ventricular systolic pressure from tricuspid regurgitation is normal (<35 mmHg).  · The aortic valve is structurally normal with no regurgitation or stenosis present.  · The mitral valve is structurally normal with no significant stenosis present. Mild mitral valve regurgitation is present.  · Mild tricuspid valve regurgitation is present. Estimated right ventricular systolic pressure from tricuspid regurgitation is normal (<35 mmHg).  · There is no evidence of pericardial effusion. .      Plan for Follow-up of Pending Labs/Results: Inbox     Discharge Details        Discharge Medications        New Medications        Instructions Start Date   vancomycin 125 MG capsule  Commonly known as: VANCOCIN   Take 1 capsule by mouth  Every 6 (Six) Hours for 14 days, THEN 1 capsule 2 (Two) Times a Day for 14 days. Indications: Colon Inflammation due to Clostridium Bacteria Overgrowth   Start Date: February 1, 2024            Continue These Medications        Instructions Start Date   ALPRAZolam 1 MG tablet  Commonly known as: XANAX   Take 1 tablet (1 mg total) by mouth 2 (two) times a day if needed for anxiety.      busPIRone 15 MG tablet  Commonly known as: BUSPAR   Take 1 tablet (15 mg total) by mouth in the morning and 1 tablet (15 mg total) before bedtime.      cetirizine 10 MG tablet  Commonly known as: zyrTEC   10 mg, Oral, Daily      cloNIDine 0.1 MG tablet  Commonly known as: CATAPRES   0.1 mg, Oral, 2 Times Daily      colestipol 1 g tablet  Commonly known as: COLESTID   Take 1 tablet by mouth 2 times daily.      docusate 50 MG/5ML liquid  Commonly known as: COLACE   100 mg, Oral, Daily      Flovent  MCG/ACT inhaler  Generic drug: fluticasone   Inhale 1 puff by mouth 2 (Two) Times a Day.      hydroCHLOROthiazide 25 MG tablet  Commonly known as: HYDRODIURIL   25 mg, Oral, Daily      HYDROcodone-acetaminophen 7.5-325 MG/15ML solution  Commonly known as: HYCET   15 mL, Oral, Every 4 Hours PRN      hydrOXYzine pamoate 25 MG capsule  Commonly known as: VISTARIL   Take 1 capsule (25 mg total) by mouth 3 (three) times a day if needed for itching.      lisinopril 20 MG tablet  Commonly known as: PRINIVIL,ZESTRIL   Take 1 tablet by mouth in the morning, and take 1 tablet in the evening.      montelukast 10 MG tablet  Commonly known as: SINGULAIR   Take 1 tablet (10 mg total) by mouth every night.      naloxone 4 MG/0.1ML nasal spray  Commonly known as: NARCAN   Call 911. Don't prime. [place] in 1 nostril for overdose. Repeat in 2-3 minutes in other nostril if no or minimal breathing/responsiveness.      ondansetron ODT 4 MG disintegrating tablet  Commonly known as: ZOFRAN-ODT   Dissolve 1 tablet on the tongue Every 6 (Six) Hours As Needed for  Nausea or Vomiting.      promethazine 6.25 MG/5ML syrup  Commonly known as: PHENERGAN   12.5 mg, Oral, Every 6 Hours PRN      propranolol 40 MG tablet  Commonly known as: INDERAL   Take 1 tablet by mouth twice a day.      sucralfate 1 g tablet  Commonly known as: CARAFATE   1 g, Oral, 4 Times Daily Before Meals & Nightly      Trintellix 20 MG tablet  Generic drug: Vortioxetine HBr   Take 1 tablet (20 mg total) by mouth 1 time each day.      Ventolin  (90 Base) MCG/ACT inhaler  Generic drug: albuterol sulfate HFA   Inhale 2 puffs by mouth Every 4 (Four) Hours As Needed for Wheezing.               Allergies   Allergen Reactions    Metoprolol Other (See Comments)     Pt reports no longer taking; reports that it caused heart rate to increase into 170's; and elevated SBP >200    Pt reports no longer taking; reports that it caused heart rate to increase into 170's; and elevated SBP >200         Discharge Disposition:  Home or Self Care    Diet:  Hospital:  Diet Order   Procedures    Diet: Gastrointestinal Diets; Fiber-Restricted, Low Irritant; Texture: Regular Texture (IDDSI 7); Fluid Consistency: Thin (IDDSI 0)            Activity:      Restrictions or Other Recommendations:       CODE STATUS:    Code Status and Medical Interventions:   Ordered at: 01/30/24 0218     Level Of Support Discussed With:    Patient     Code Status (Patient has no pulse and is not breathing):    CPR (Attempt to Resuscitate)     Medical Interventions (Patient has pulse or is breathing):    Full Support       No future appointments.    Additional Instructions for the Follow-ups that You Need to Schedule       Discharge Follow-up with PCP   As directed       Currently Documented PCP:    Chloe Barksdale MD    PCP Phone Number:    [phone]     Follow Up Details: 1 week        Discharge Follow-up with Specified Provider: Dr Farzad Au; 1 Month   As directed      To: Dr Farzad Au   Follow Up: 1 Month        Discharge Follow-up  with Specified Provider: Dr Haji as scheduled in 2 weeks   As directed      To: Dr Haji as scheduled in 2 weeks                      Lydia Keenan,   02/01/24      Time Spent on Discharge:  I spent  48  minutes on this discharge activity which included: face-to-face encounter with the patient, reviewing the data in the system, coordination of the care with the nursing staff as well as consultants, documentation, and entering orders.

## 2024-02-01 NOTE — PROGRESS NOTES
INFECTIOUS DISEASE Progress Note    Isi Bolton  1970  [id number]      Admission Date: 1/29/2024      Requesting Provider: No ref. provider found  Evaluating Physician: Farzad Au MD    Reason for Consultation: C. Difficile infection    History of present illness:    1/31/24: Patient is a 53 y.o. female  with a history of Crohn's disease and gastroesophageal reflux disease who underwent a recent  gastric diverticular resection and Hill Fundoplication on 1/18/24 who presented with persistent diarrhea complicated by a possible new onset seizure. She has a history of long-standing Crohn's disease followed by Dr. Morales. She had chronic symptoms of nausea and vomiting with multiple prior admissions for these symptoms.  She complains of diarrhea of 6-7 stools per day along with an episode of vomiting. She was afebrile at home and has been afebrile since the patient. GI PCR panel is negative.Her white blood cell count yesterday was 11.1  Her white blood cell count today is 6.48. She is employed as a nurse at [employer].     2/1/24: She remains afebrile. White blood cell count is 6.  Hemoglobin is 9.6.  O2 saturation is 96% on 2 L.She has some persistent nausea.  She has decreased vomiting.  She denies increased diarrhea overnight.  She denies increased abdominal pain.    Past Medical History:   Diagnosis Date    Angina pectoris     Asthma     Cancer     cervical    Crohn disease     Deaf, right     Heart disease     Hypertension     PONV (postoperative nausea and vomiting)        Past Surgical History:   Procedure Laterality Date    CHOLECYSTECTOMY      ENDOSCOPY N/A 12/26/2023    Procedure: ESOPHAGOGASTRODUODENOSCOPY;  Surgeon: Eh Pascal MD;  Location: [facility] ENDOSCOPY;  Service: Gastroenterology;  Laterality: N/A;    NISSEN FUNDOPLICATION N/A 1/18/2024    Procedure: LAPAROSCOPIC RESECTION GASTRIC DIVERTICULUM, AND HILL FUNDOPLICATIO;  Surgeon: Vishal Haji MD;  Location:   [facility];  Service: General;  Laterality: N/A;    TUBAL ABDOMINAL LIGATION         Family History   Problem Relation Age of Onset    Hypertension Mother     No Known Problems Father     No Known Problems Sister     Cancer Maternal Uncle     Heart attack Maternal Uncle     Breast cancer Paternal Aunt         50's    Cancer Paternal Uncle     Cancer Maternal Grandmother     Breast cancer Maternal Grandmother         70's    Heart attack Cousin        Social History     Socioeconomic History    Marital status: Single   Tobacco Use    Smoking status: Former     Types: Electronic Cigarette     Passive exposure: Past    Smokeless tobacco: Never   Vaping Use    Vaping Use: Former   Substance and Sexual Activity    Alcohol use: Not Currently    Drug use: No    Sexual activity: Defer       Allergies   Allergen Reactions    Metoprolol Other (See Comments)     Pt reports no longer taking; reports that it caused heart rate to increase into 170's; and elevated SBP >200    Pt reports no longer taking; reports that it caused heart rate to increase into 170's; and elevated SBP >200         Medication:    Current Facility-Administered Medications:     acetaminophen (TYLENOL) tablet 650 mg, 650 mg, Oral, Q4H PRN, 650 mg at 01/31/24 1826 **OR** acetaminophen (TYLENOL) 160 MG/5ML oral solution 650 mg, 650 mg, Oral, Q4H PRN **OR** acetaminophen (TYLENOL) suppository 650 mg, 650 mg, Rectal, Q4H PRN, Reji Galaviza W, APRN    albuterol (PROVENTIL) nebulizer solution 0.083% 2.5 mg/3mL, 2.5 mg, Nebulization, Q4H PRN, Reji Galaviza W, APRN    ALPRAZolam (XANAX) tablet 1 mg, 1 mg, Oral, BID PRN, Jeannie Galaviz, APRN, 1 mg at 01/31/24 2056    budesonide (PULMICORT) nebulizer solution 0.5 mg, 0.5 mg, Nebulization, BID - RT, Jeannie Galaviz, APRN, 0.5 mg at 01/31/24 1931    busPIRone (BUSPAR) tablet 15 mg, 15 mg, Oral, Q12H, Jeannie Galaviz W, APRN, 15 mg at 01/31/24 2055    Calcium Replacement - Follow Nurse / BPA Driven  Protocol, , Does not apply, PRN, Jeannie Galaviz, APRN    cetirizine (zyrTEC) tablet 10 mg, 10 mg, Oral, Daily, Jeannie Galaviz, APRN, 10 mg at 01/31/24 0959    cholestyramine light packet 4 g, 1 packet, Oral, Daily, Jeannie Galaviz, APRN, 4 g at 01/31/24 0959    cloNIDine (CATAPRES) tablet 0.1 mg, 0.1 mg, Oral, BID, Jeannie Galaviz, APRN, 0.1 mg at 01/31/24 2055    hydroCHLOROthiazide (HYDRODIURIL) tablet 25 mg, 25 mg, Oral, Daily, Lydia Keenan DO, 25 mg at 01/31/24 1705    lisinopril (PRINIVIL,ZESTRIL) tablet 20 mg, 20 mg, Oral, Q24H, Lydia Keenan DO, 20 mg at 01/31/24 1706    Magnesium Standard Dose Replacement - Follow Nurse / BPA Driven Protocol, , Does not apply, PRN, Jeannie Galaviz, APRN    montelukast (SINGULAIR) tablet 10 mg, 10 mg, Oral, Nightly, Jeannie Galaviz, APRN, 10 mg at 01/31/24 2056    nitroglycerin (NITROSTAT) SL tablet 0.4 mg, 0.4 mg, Sublingual, Q5 Min PRN, Jeannie Galaviz, APRN    ondansetron ODT (ZOFRAN-ODT) disintegrating tablet 4 mg, 4 mg, Oral, Q6H PRN, 4 mg at 01/31/24 1007 **OR** ondansetron (ZOFRAN) injection 4 mg, 4 mg, Intravenous, Q6H PRN, Jeannie Galaviz, APRN, 4 mg at 01/30/24 0645    Pharmacy Consult, , Does not apply, Continuous PRN, Lydia Keenan DO    Phosphorus Replacement - Follow Nurse / BPA Driven Protocol, , Does not apply, PRN, Jeannie Galaviz, APRN    Potassium Replacement - Follow Nurse / BPA Driven Protocol, , Does not apply, PRN, Jeannie Galaviz APRN    propranolol (INDERAL) tablet 40 mg, 40 mg, Oral, Q12H, Jeannie Galaviz, APRN, 40 mg at 01/31/24 2056    Sodium Chloride (PF) 0.9 % 10 mL, 10 mL, Intravenous, PRN, Jeannie Galaviz APRN    [COMPLETED] Insert Peripheral IV, , , Once **AND** sodium chloride 0.9 % flush 10 mL, 10 mL, Intravenous, PRN, Jabier Iglesias, PA    sodium chloride 0.9 % flush 10 mL, 10 mL, Intravenous, Q12H, Jeannie Galaviz, APRN, 10 mL at 01/30/24 0957    sodium  chloride 0.9 % flush 10 mL, 10 mL, Intravenous, PRN, Jeannie Galaviz, APRN    sodium chloride 0.9 % infusion 40 mL, 40 mL, Intravenous, PRN, Jeannie Galaviz, APRN    sucralfate (CARAFATE) tablet 1 g, 1 g, Oral, 4x Daily AC & at Bedtime, Jeannie Galaviz APRN, 1 g at 24    vancomycin (VANCOCIN) capsule 125 mg, 125 mg, Oral, Q6H, Lydia Keenan DO, 125 mg at 24 0001    vortioxetine (TRINTELLIX) tablet 20 mg - PATIENT SUPPLIED, 20 mg, Oral, Daily, Jeannie Galaviz APRN, 20 mg at 24 1008    Antibiotics:  Anti-Infectives (From admission, onward)      Ordered     Dose/Rate Route Frequency Start Stop    24 0942  vancomycin (VANCOCIN) capsule 125 mg        Ordering Provider: Lydia Keenan DO    125 mg Oral Every 6 Hours Scheduled 24 1200 02/10/24 1159              Review of Systems:  See HPI      Physical Exam:   Vital Signs  Temp (24hrs), Av.3 °F (36.8 °C), Min:97.9 °F (36.6 °C), Max:98.8 °F (37.1 °C)    Temp  Min: 97.9 °F (36.6 °C)  Max: 98.8 °F (37.1 °C)  BP  Min: 131/87  Max: 152/113  Pulse  Min: 64  Max: 96  Resp  Min: 16  Max: 18  SpO2  Min: 80 %  Max: 100 %    GENERAL: Awake and alert, in no acute distress.   HEENT: Normocephalic, atraumatic.  PERRL. EOMI. No conjunctival injection. No icterus. Oropharynx clear without evidence of thrush or exudate.  NECK: Supple   HEART: RRR; No murmur, rubs, gallops.   LUNGS: Clear to auscultation bilaterally without wheezing, rales, rhonchi. Normal respiratory effort. Nonlabored. .  ABDOMEN: Soft, nontender, nondistended.  No rebound or guarding. NO mass or HSM.  EXT:  No cyanosis, clubbing or edema. No cord.  :  Without Mae catheter.  MSK: No joint effusions or erythema  SKIN: Warm and dry without cutaneous eruptions on Inspection/palpation.    NEURO: Oriented to PPT.  Motor 5/5 strength  PSYCHIATRIC: Normal insight and judgment. Cooperative with PE    Laboratory Data    Results from last 7 days   Lab Units  "02/01/24  0458 01/31/24  0326 01/30/24  0642   WBC 10*3/mm3 5.98 6.48 11.09*   HEMOGLOBIN g/dL 9.6* 9.1* 10.1*   HEMATOCRIT % 29.3* 27.8* 29.4*   PLATELETS 10*3/mm3 223 198 257     Results from last 7 days   Lab Units 02/01/24  0458   SODIUM mmol/L 137   POTASSIUM mmol/L 5.6*   CHLORIDE mmol/L 104   CO2 mmol/L 25.0   BUN mg/dL 5*   CREATININE mg/dL 0.72   GLUCOSE mg/dL 103*   CALCIUM mg/dL 9.2     Results from last 7 days   Lab Units 01/29/24  2037   ALK PHOS U/L 83   BILIRUBIN mg/dL 0.6   ALT (SGPT) U/L 11   AST (SGOT) U/L 23     Results from last 7 days   Lab Units 01/29/24  2037   SED RATE mm/hr 18     Results from last 7 days   Lab Units 01/30/24  0055   CRP mg/dL <0.30     Results from last 7 days   Lab Units 01/30/24  0007   LACTATE mmol/L 0.9             Estimated Creatinine Clearance: 88.6 mL/min (by C-G formula based on SCr of 0.72 mg/dL).      Microbiology:  No results found for: \"ACANTHNAEG\", \"AFBCX\", \"BPERTUSSISCX\", \"BLOODCX\"  No results found for: \"BCIDPCR\", \"CXREFLEX\", \"CSFCX\", \"CULTURETIS\"  No results found for: \"CULTURES\", \"HSVCX\", \"URCX\"  No results found for: \"EYECULTURE\", \"GCCX\", \"HSVCULTURE\", \"LABHSV\"  No results found for: \"LEGIONELLA\", \"MRSACX\", \"MUMPSCX\", \"MYCOPLASCX\"  No results found for: \"NOCARDIACX\", \"STOOLCX\"  No results found for: \"THROATCX\", \"UNSTIMCULT\", \"URINECX\", \"CULTURE\", \"VZVCULTUR\"  No results found for: \"VIRALCULTU\", \"WOUNDCX\"        Radiology:  Imaging Results (Last 72 Hours)       Procedure Component Value Units Date/Time    MRI Brain Without Contrast [[id number]] Collected: 01/30/24 0828     Updated: 01/30/24 0837    Narrative:      MRI BRAIN WO CONTRAST    Date of Exam: 1/30/2024 7:40 AM EST    Indication: Seizure, new-onset, no history of trauma.     Comparison: 4/11/2017.    Technique:  Routine multiplanar/multisequence sequence images of the brain were obtained without contrast administration.      Findings:  There are no areas of restricted diffusion. There are a few " scattered foci of increased signal intensity in the right frontal and parietal lobes as best seen on the inversion recovery images. This has advanced only minimally since the prior study. There   is no acute mass effect or edema. There is no acute CVA or hemorrhage. Ventricles are normal in size and shape and are midline. Structures of the posterior fossa and brainstem appear normal.    Impression:      Impression:  Evidence of mild chronic small vessel ischemic insult. No acute process.        Electronically Signed: Adwoa Eller MD    1/30/2024 8:34 AM EST    Workstation ID: [id number]    CT Abdomen Pelvis With Contrast [[id number]] Collected: 01/29/24 2318     Updated: 01/29/24 2324    Narrative:      CT ABDOMEN PELVIS W CONTRAST    Date of Exam: 1/29/2024 10:58 PM EST    Indication: Nausea, vomiting, diarrhea, abdominal pain.  Laparoscopic resection of gastric diverticulum on January 18.    Comparison: 12/25/2023, 1/19/2024.    Technique: Axial CT images were obtained of the abdomen and pelvis following the uneventful intravenous administration of intravenous contrast. Reconstructed coronal and sagittal images were also obtained. Automated exposure control and iterative   construction methods were used.      Findings:  Lung Bases:     The visualized lung bases and lower mediastinal structures are unremarkable.    Liver:  Liver is normal in size and CT density. No focal lesions.    Biliary/Gallbladder:    The gallbladder has been resected. The biliary tree is nondilated.    Spleen:  Spleen is normal in size and CT density.    Pancreas:    Pancreas is normal. There is no evidence of pancreatic mass or peripancreatic fluid.    Kidneys:    Kidneys are normal in size. There are no stones or hydronephrosis.    Adrenals:    Adrenal glands are unremarkable.    Retroperitoneal/Lymph Nodes/Vasculature:    No retroperitoneal adenopathy is identified.    Gastrointestinal/Mesentery:    Significant postsurgical changes are  present within the stomach. Multiple clips are present. No abnormal free fluid or collection identified. No evidence of free air. Stomach appears normal in caliber. No significant stool burden. No evidence of hernia.   The bowel loops are non-dilated without wall thickening or mass. The appendix appears within normal limits. No evidence of obstruction. No free air. No mesenteric fluid collections identified.    Bladder:    Mild circumferential bladder wall thickening is present with suspected mild stranding within the adjacent fat. Limited evaluation due to under distention..    Genital:     Unremarkable          Bony Structures:     Visualized bony structures are consistent with the patient's age.        Impression:      Impression:  1.No acute intra-abdominal or intrapelvic process. Significant postsurgical changes are present within the stomach. No evidence of obstruction. No abnormal free fluid or collection identified.  2.Mild circumferential bladder wall thickening with suspected mild stranding within the adjacent fat. Limited evaluation due to under distention. Recommend clinical correlation for findings of cystitis.  3.Ancillary findings as described above.        Electronically Signed: Sonia Rider MD    1/29/2024 11:21 PM EST    Workstation ID: [id number]    CT Head Without Contrast [[id number]] Collected: 01/29/24 2314     Updated: 01/29/24 2319    Narrative:      CT HEAD WO CONTRAST    Date of Exam: 1/29/2024 10:58 PM EST    Indication: Seizure.    Comparison: 4/11/2017.    Technique: Axial CT images were obtained of the head without contrast administration.  Automated exposure control and iterative construction methods were used.      Findings:  There is no evidence of hemorrhage. There is no mass effect or midline shift.    There is no extracerebral collection.    Ventricles are normal in size and configuration for patient's stated age.      Posterior fossa is within normal limits.    Calvarium and skull  base appear intact.   Visualized sinuses show no air fluid levels. Visualized orbits are unremarkable.      Impression:      Impression:  No acute intracranial process identified.        Electronically Signed: Sonia Rider MD    1/29/2024 11:16 PM EST    Workstation ID: [id number]              Impression:    C. Difficile infection-versus colonization.  The fact that her PCR was positive but the EIA toxin was negative suggest that this is colonization rather than true infection.  Still, I think it would be prudent to treat her with a course of oral vancomycin.  I discussed this complex situation in detail with her today.  Crohn's disease  Diarrhea-secondary to Crohn's disease versus C. Difficile infection  Electrolyte disturbances  Recurrent nausea and vomiting  Status post gastric diverticular resection/Hill fundoplication-1/18/24  New onset seizure-versus vasovagal response    PLAN/RECOMMENDATIONS:    Evaluation for possible seizures per neurology   Vancomycin 125 mg by mouth 4 times a day ×2 weeks followed by 125 mg by mouth twice a day ×2 weeks  Avoid other antibiotics if at all possible  Spore precautions for C. Difficile    Discharge to home   follow-up with me in 1 month     I discussed the epidemiology, biology, treatment, and prevention of C. Difficile infection with her in detail today.  Due to her underlying Crohn's disease, she will be at risk for protracted colonization with C. Difficile.     I discussed her complex situation with Dr. Haji today.    Farzad Au MD  2/1/2024  06:20 EST

## 2024-02-06 ENCOUNTER — PATIENT OUTREACH (OUTPATIENT)
Dept: CASE MANAGEMENT | Facility: OTHER | Age: 54
End: 2024-02-06
Payer: COMMERCIAL

## 2024-02-06 NOTE — OUTREACH NOTE
Adult Patient Profile  Questions/Answers      Flowsheet Row Most Recent Value   Symptoms/Conditions Managed at Home gastrointestinal   Barriers to Managing Health none   Gastrointestinal Symptoms/Conditions abdominal pain, diarrhea, vomiting  [Crohns'dz]   Gastrointestinal Management Strategies coping strategies, diet modification, medication therapy   Gastrointestinal Self-Management Outcome 3 (uncertain)   Missed Doses of Prescribed Medications During Past Week no   Taken Prescribed Medications at Different Time or Schedule During Past Week no   Taken More or Less Medication Than Prescribed no   Barriers to Taking Medication as Prescribed none   How to be Addressed Isi   How Well Do You Speak English? very well   Source of Information patient        Pediatric Patient Profile    Questions/Answers      Flowsheet Row Most Recent Value   Gastrointestinal Self-Management Outcome 3 (uncertain)   Missed Doses of Prescribed Medications During Past Week no   Taken Prescribed Medications at Different Time or Schedule During Past Week no   Taken More or Less Medication Than Prescribed no   Barriers to Taking Medication as Prescribed none   How to be Addressed Isi   How Well Do You Speak English? very well        AMBULATORY CASE MANAGEMENT NOTE    Name and Relationship of Patient/Support Person: Isi Bolton - Self  Self    Patient Outreach    Call to patient after recent ED visit. States she has a PMH of Crohns dz. She is treated with Colestid. She started having issues with feeling like food was not passing through digestive tract from stomach and often vomiting. ON 1/18 she was admitted for surgery for gastric diverticulum and Hill fundoplication. She returned to the ED on 1/29 with severe dehydration, d/t n/v and diarrhea and possible seizure d/t electrolyte imbalance.  Pt was admitted and discharged on 2/1. States she is on Vancomycin po to treat the cdiff. Still having some diarrhea but not as bad as  before. She is able to drink water and sports drinks to stay hydrated and is eating some food. Still having nausea and treating with Zofran. Pt states incisional pain is uncomfortable to not intolerable. She is using her IS and getting up and walking around the house. No s/s of infection around incisions. She has f/u this month with ID and surgery. No other concerns at this time. Provided ECM contact if needed but agrees to f/u after MD appts. No SDOH deficits noted at this time.      NGOZI GRANGER  Ambulatory Case Management    2/6/2024, 12:49 EST

## 2024-03-22 RX ORDER — SUCRALFATE 1 G/1
1 TABLET ORAL 4 TIMES DAILY
Qty: 120 TABLET | Refills: 1 | Status: SHIPPED | OUTPATIENT
Start: 2024-03-22 | End: 2025-03-22

## 2024-03-24 RX ORDER — SUCRALFATE 1 G/1
1 TABLET ORAL
Qty: 60 TABLET | Refills: 1 | Status: SHIPPED | OUTPATIENT
Start: 2024-03-24

## 2024-03-26 ENCOUNTER — TRANSCRIBE ORDERS (OUTPATIENT)
Dept: LAB | Facility: HOSPITAL | Age: 54
End: 2024-03-26
Payer: COMMERCIAL

## 2024-03-26 ENCOUNTER — LAB (OUTPATIENT)
Dept: LAB | Facility: HOSPITAL | Age: 54
End: 2024-03-26
Payer: COMMERCIAL

## 2024-03-26 DIAGNOSIS — Z13.220 SCREENING FOR HYPERLIPIDEMIA: ICD-10-CM

## 2024-03-26 DIAGNOSIS — D64.9 ANEMIA, UNSPECIFIED TYPE: ICD-10-CM

## 2024-03-26 DIAGNOSIS — D55.9 ANEMIA DUE TO ENZYME DISORDER: Primary | ICD-10-CM

## 2024-03-26 PROCEDURE — 82607 VITAMIN B-12: CPT

## 2024-03-26 PROCEDURE — 36415 COLL VENOUS BLD VENIPUNCTURE: CPT

## 2024-03-26 PROCEDURE — 80061 LIPID PANEL: CPT

## 2024-03-26 PROCEDURE — 83921 ORGANIC ACID SINGLE QUANT: CPT

## 2024-03-26 PROCEDURE — 80050 GENERAL HEALTH PANEL: CPT

## 2024-03-27 LAB
ALBUMIN SERPL-MCNC: 4.7 G/DL (ref 3.5–5.2)
ALBUMIN/GLOB SERPL: 1.4 G/DL
ALP SERPL-CCNC: 57 U/L (ref 39–117)
ALT SERPL W P-5'-P-CCNC: 14 U/L (ref 1–33)
ANION GAP SERPL CALCULATED.3IONS-SCNC: 12.7 MMOL/L (ref 5–15)
AST SERPL-CCNC: 23 U/L (ref 1–32)
BASOPHILS # BLD AUTO: 0.1 10*3/MM3 (ref 0–0.2)
BASOPHILS NFR BLD AUTO: 1.1 % (ref 0–1.5)
BILIRUB SERPL-MCNC: 0.3 MG/DL (ref 0–1.2)
BUN SERPL-MCNC: 17 MG/DL (ref 6–20)
BUN/CREAT SERPL: 14.4 (ref 7–25)
CALCIUM SPEC-SCNC: 10 MG/DL (ref 8.6–10.5)
CHLORIDE SERPL-SCNC: 96 MMOL/L (ref 98–107)
CHOLEST SERPL-MCNC: 214 MG/DL (ref 0–200)
CO2 SERPL-SCNC: 28.3 MMOL/L (ref 22–29)
CREAT SERPL-MCNC: 1.18 MG/DL (ref 0.57–1)
DEPRECATED RDW RBC AUTO: 40.5 FL (ref 37–54)
EGFRCR SERPLBLD CKD-EPI 2021: 55 ML/MIN/1.73
EOSINOPHIL # BLD AUTO: 0.13 10*3/MM3 (ref 0–0.4)
EOSINOPHIL NFR BLD AUTO: 1.5 % (ref 0.3–6.2)
ERYTHROCYTE [DISTWIDTH] IN BLOOD BY AUTOMATED COUNT: 11.9 % (ref 12.3–15.4)
GLOBULIN UR ELPH-MCNC: 3.4 GM/DL
GLUCOSE SERPL-MCNC: 103 MG/DL (ref 65–99)
HCT VFR BLD AUTO: 35.5 % (ref 34–46.6)
HDLC SERPL-MCNC: 89 MG/DL (ref 40–60)
HGB BLD-MCNC: 11.9 G/DL (ref 12–15.9)
IMM GRANULOCYTES # BLD AUTO: 0.01 10*3/MM3 (ref 0–0.05)
IMM GRANULOCYTES NFR BLD AUTO: 0.1 % (ref 0–0.5)
LDLC SERPL CALC-MCNC: 109 MG/DL (ref 0–100)
LDLC/HDLC SERPL: 1.2 {RATIO}
LYMPHOCYTES # BLD AUTO: 3.54 10*3/MM3 (ref 0.7–3.1)
LYMPHOCYTES NFR BLD AUTO: 40 % (ref 19.6–45.3)
MCH RBC QN AUTO: 31.6 PG (ref 26.6–33)
MCHC RBC AUTO-ENTMCNC: 33.5 G/DL (ref 31.5–35.7)
MCV RBC AUTO: 94.4 FL (ref 79–97)
MONOCYTES # BLD AUTO: 0.91 10*3/MM3 (ref 0.1–0.9)
MONOCYTES NFR BLD AUTO: 10.3 % (ref 5–12)
NEUTROPHILS NFR BLD AUTO: 4.15 10*3/MM3 (ref 1.7–7)
NEUTROPHILS NFR BLD AUTO: 47 % (ref 42.7–76)
NRBC BLD AUTO-RTO: 0 /100 WBC (ref 0–0.2)
PLATELET # BLD AUTO: 362 10*3/MM3 (ref 140–450)
PMV BLD AUTO: 9.6 FL (ref 6–12)
POTASSIUM SERPL-SCNC: 3.7 MMOL/L (ref 3.5–5.2)
PROT SERPL-MCNC: 8.1 G/DL (ref 6–8.5)
RBC # BLD AUTO: 3.76 10*6/MM3 (ref 3.77–5.28)
SODIUM SERPL-SCNC: 137 MMOL/L (ref 136–145)
TRIGL SERPL-MCNC: 90 MG/DL (ref 0–150)
TSH SERPL DL<=0.05 MIU/L-ACNC: 2.4 UIU/ML (ref 0.27–4.2)
VIT B12 BLD-MCNC: 1834 PG/ML (ref 211–946)
VLDLC SERPL-MCNC: 16 MG/DL (ref 5–40)
WBC NRBC COR # BLD AUTO: 8.84 10*3/MM3 (ref 3.4–10.8)

## 2024-04-01 LAB — METHYLMALONATE SERPL-SCNC: 266 NMOL/L (ref 0–378)

## 2024-04-03 ENCOUNTER — TRANSCRIBE ORDERS (OUTPATIENT)
Dept: ADMINISTRATIVE | Facility: HOSPITAL | Age: 54
End: 2024-04-03
Payer: COMMERCIAL

## 2024-04-03 DIAGNOSIS — Z12.31 SCREENING MAMMOGRAM FOR BREAST CANCER: Primary | ICD-10-CM

## 2024-04-05 ENCOUNTER — HOSPITAL ENCOUNTER (OUTPATIENT)
Dept: MAMMOGRAPHY | Facility: HOSPITAL | Age: 54
Discharge: HOME | End: 2024-04-05
Admitting: FAMILY MEDICINE
Payer: COMMERCIAL

## 2024-04-05 DIAGNOSIS — Z12.31 SCREENING MAMMOGRAM FOR BREAST CANCER: ICD-10-CM

## 2024-04-05 PROCEDURE — 77063 BREAST TOMOSYNTHESIS BI: CPT

## 2024-04-05 PROCEDURE — 77067 SCR MAMMO BI INCL CAD: CPT

## 2024-08-08 DIAGNOSIS — R06.02 SHORTNESS OF BREATH: ICD-10-CM

## 2024-08-08 RX ORDER — ALBUTEROL SULFATE 90 UG/1
2 AEROSOL, METERED RESPIRATORY (INHALATION) EVERY 4 HOURS PRN
Qty: 18 G | Refills: 11 | OUTPATIENT
Start: 2024-08-08

## 2024-08-08 RX ORDER — FLUTICASONE PROPIONATE 110 UG/1
1 AEROSOL, METERED RESPIRATORY (INHALATION) 2 TIMES DAILY
Qty: 12 G | Refills: 2 | OUTPATIENT
Start: 2024-08-08

## 2024-08-12 ENCOUNTER — LAB (OUTPATIENT)
Dept: LAB | Facility: HOSPITAL | Age: 54
End: 2024-08-12
Payer: COMMERCIAL

## 2024-08-12 ENCOUNTER — TRANSCRIBE ORDERS (OUTPATIENT)
Dept: LAB | Facility: HOSPITAL | Age: 54
End: 2024-08-12
Payer: COMMERCIAL

## 2024-08-12 DIAGNOSIS — R10.10 UPPER ABDOMINAL PAIN: Primary | ICD-10-CM

## 2024-08-12 DIAGNOSIS — K31.4 GASTRIC DIVERTICULUM: ICD-10-CM

## 2024-08-12 DIAGNOSIS — R10.10 UPPER ABDOMINAL PAIN: ICD-10-CM

## 2024-08-12 PROCEDURE — 80053 COMPREHEN METABOLIC PANEL: CPT

## 2024-08-12 PROCEDURE — 36415 COLL VENOUS BLD VENIPUNCTURE: CPT

## 2024-08-12 PROCEDURE — 86301 IMMUNOASSAY TUMOR CA 19-9: CPT

## 2024-08-13 LAB
ALBUMIN SERPL-MCNC: 4.7 G/DL (ref 3.5–5.2)
ALBUMIN/GLOB SERPL: 1.4 G/DL
ALP SERPL-CCNC: 69 U/L (ref 39–117)
ALT SERPL W P-5'-P-CCNC: 21 U/L (ref 1–33)
ANION GAP SERPL CALCULATED.3IONS-SCNC: 15.4 MMOL/L (ref 5–15)
AST SERPL-CCNC: 36 U/L (ref 1–32)
BILIRUB SERPL-MCNC: 0.3 MG/DL (ref 0–1.2)
BUN SERPL-MCNC: 14 MG/DL (ref 6–20)
BUN/CREAT SERPL: 14.4 (ref 7–25)
CALCIUM SPEC-SCNC: 10.1 MG/DL (ref 8.6–10.5)
CANCER AG19-9 SERPL-ACNC: 7.6 U/ML
CHLORIDE SERPL-SCNC: 91 MMOL/L (ref 98–107)
CO2 SERPL-SCNC: 24.6 MMOL/L (ref 22–29)
CREAT SERPL-MCNC: 0.97 MG/DL (ref 0.57–1)
EGFRCR SERPLBLD CKD-EPI 2021: 69.6 ML/MIN/1.73
GLOBULIN UR ELPH-MCNC: 3.4 GM/DL
GLUCOSE SERPL-MCNC: 81 MG/DL (ref 65–99)
POTASSIUM SERPL-SCNC: 4.7 MMOL/L (ref 3.5–5.2)
PROT SERPL-MCNC: 8.1 G/DL (ref 6–8.5)
SODIUM SERPL-SCNC: 131 MMOL/L (ref 136–145)

## 2024-08-14 ENCOUNTER — TRANSCRIBE ORDERS (OUTPATIENT)
Dept: ADMINISTRATIVE | Facility: HOSPITAL | Age: 54
End: 2024-08-14
Payer: COMMERCIAL

## 2024-08-14 DIAGNOSIS — K31.4 GASTRIC DIVERTICULUM: Primary | ICD-10-CM

## 2024-08-14 DIAGNOSIS — R07.89 ATYPICAL CHEST PAIN: ICD-10-CM

## 2024-08-23 RX ORDER — HYDROCHLOROTHIAZIDE 25 MG/1
25 TABLET ORAL DAILY
Qty: 90 TABLET | Refills: 3 | Status: SHIPPED | OUTPATIENT
Start: 2024-08-23

## 2024-10-29 ENCOUNTER — TELEPHONE (OUTPATIENT)
Dept: INFUSION THERAPY | Facility: HOSPITAL | Age: 54
End: 2024-10-29
Payer: COMMERCIAL

## 2024-10-29 NOTE — TELEPHONE ENCOUNTER
Attempted to contact patient as pre-procedure phone call prior to planned CTA coronary for 10/30/24. Left voice message with patient arrival time to main registration, nothing to eat or drink by mouth 4 hours prior to arrival, no caffeine after midnight,  recommended and department telephone number.

## 2024-10-30 ENCOUNTER — HOSPITAL ENCOUNTER (OUTPATIENT)
Dept: CT IMAGING | Facility: HOSPITAL | Age: 54
Discharge: HOME | End: 2024-10-30
Payer: COMMERCIAL

## 2024-10-30 VITALS
RESPIRATION RATE: 18 BRPM | HEART RATE: 73 BPM | DIASTOLIC BLOOD PRESSURE: 79 MMHG | SYSTOLIC BLOOD PRESSURE: 128 MMHG | BODY MASS INDEX: 26.02 KG/M2 | WEIGHT: 156.2 LBS | HEIGHT: 65 IN | TEMPERATURE: 96.9 F | OXYGEN SATURATION: 96 %

## 2024-10-30 DIAGNOSIS — R07.89 ATYPICAL CHEST PAIN: ICD-10-CM

## 2024-10-30 DIAGNOSIS — K31.4 GASTRIC DIVERTICULUM: ICD-10-CM

## 2024-10-30 PROCEDURE — 75574 CT ANGIO HRT W/3D IMAGE: CPT

## 2024-10-30 PROCEDURE — 25510000001 IOPAMIDOL 61 % SOLUTION: Performed by: FAMILY MEDICINE

## 2024-10-30 PROCEDURE — 74160 CT ABDOMEN W/CONTRAST: CPT

## 2024-10-30 RX ORDER — NITROGLYCERIN 0.4 MG/1
0.4 TABLET SUBLINGUAL
Status: COMPLETED | OUTPATIENT
Start: 2024-10-30 | End: 2024-10-30

## 2024-10-30 RX ORDER — IOPAMIDOL 612 MG/ML
100 INJECTION, SOLUTION INTRAVASCULAR
Status: COMPLETED | OUTPATIENT
Start: 2024-10-30 | End: 2024-10-30

## 2024-10-30 RX ORDER — NITROGLYCERIN 0.4 MG/1
0.8 TABLET SUBLINGUAL
Status: COMPLETED | OUTPATIENT
Start: 2024-10-30 | End: 2024-10-30

## 2024-10-30 RX ORDER — IVABRADINE 5 MG/1
15 TABLET, FILM COATED ORAL ONCE
Status: COMPLETED | OUTPATIENT
Start: 2024-10-30 | End: 2024-10-30

## 2024-10-30 RX ADMIN — IOPAMIDOL 65 ML: 612 INJECTION, SOLUTION INTRAVENOUS at 14:57

## 2024-10-30 RX ADMIN — IOPAMIDOL 75 ML: 612 INJECTION, SOLUTION INTRAVENOUS at 14:47

## 2024-10-30 RX ADMIN — IVABRADINE 15 MG: 5 TABLET, FILM COATED ORAL at 12:50

## 2024-10-30 RX ADMIN — NITROGLYCERIN 0.8 MG: 0.4 TABLET SUBLINGUAL at 14:22

## 2025-02-13 ENCOUNTER — TRANSCRIBE ORDERS (OUTPATIENT)
Dept: ADMINISTRATIVE | Facility: HOSPITAL | Age: 55
End: 2025-02-13
Payer: COMMERCIAL

## 2025-02-13 DIAGNOSIS — Z12.31 VISIT FOR SCREENING MAMMOGRAM: Primary | ICD-10-CM

## 2025-05-13 ENCOUNTER — HOSPITAL ENCOUNTER (OUTPATIENT)
Dept: GENERAL RADIOLOGY | Facility: HOSPITAL | Age: 55
Discharge: HOME | End: 2025-05-13
Admitting: FAMILY MEDICINE
Payer: COMMERCIAL

## 2025-05-13 DIAGNOSIS — R60.9 BODY FLUID RETENTION: ICD-10-CM

## 2025-05-13 PROCEDURE — 71046 X-RAY EXAM CHEST 2 VIEWS: CPT

## 2025-05-14 PROCEDURE — 71046 X-RAY EXAM CHEST 2 VIEWS: CPT | Performed by: RADIOLOGY

## 2025-05-22 ENCOUNTER — TRANSCRIBE ORDERS (OUTPATIENT)
Dept: ADMINISTRATIVE | Facility: HOSPITAL | Age: 55
End: 2025-05-22
Payer: COMMERCIAL

## 2025-05-22 DIAGNOSIS — R00.0 TACHYCARDIA, UNSPECIFIED: Primary | ICD-10-CM

## 2025-05-22 DIAGNOSIS — R60.9 EDEMA, UNSPECIFIED TYPE: ICD-10-CM

## 2025-06-10 ENCOUNTER — TRANSCRIBE ORDERS (OUTPATIENT)
Dept: ADMINISTRATIVE | Facility: HOSPITAL | Age: 55
End: 2025-06-10
Payer: COMMERCIAL

## 2025-06-10 ENCOUNTER — HOSPITAL ENCOUNTER (OUTPATIENT)
Dept: CARDIOLOGY | Facility: HOSPITAL | Age: 55
Discharge: HOME | End: 2025-06-10
Admitting: FAMILY MEDICINE
Payer: COMMERCIAL

## 2025-06-10 DIAGNOSIS — R60.9 EDEMA, UNSPECIFIED TYPE: ICD-10-CM

## 2025-06-10 DIAGNOSIS — R00.0 TACHYCARDIA, UNSPECIFIED: ICD-10-CM

## 2025-06-10 DIAGNOSIS — Z12.31 VISIT FOR SCREENING MAMMOGRAM: Primary | ICD-10-CM

## 2025-06-10 LAB
AORTIC DIMENSIONLESS INDEX: 0.69 (DI)
AV MEAN PRESS GRAD SYS DOP V1V2: 3 MMHG
AV VMAX SYS DOP: 119 CM/SEC
BH CV ECHO MEAS - AO MAX PG: 5.7 MMHG
BH CV ECHO MEAS - AO ROOT DIAM: 2.8 CM
BH CV ECHO MEAS - AO V2 VTI: 24.2 CM
BH CV ECHO MEAS - AVA(I,D): 1.4 CM2
BH CV ECHO MEAS - EDV(CUBED): 54.9 ML
BH CV ECHO MEAS - EDV(MOD-SP4): 20.2 ML
BH CV ECHO MEAS - EF(MOD-SP4): 60.5 %
BH CV ECHO MEAS - ESV(CUBED): 22 ML
BH CV ECHO MEAS - ESV(MOD-SP4): 8 ML
BH CV ECHO MEAS - FS: 26.3 %
BH CV ECHO MEAS - IVS/LVPW: 1.22 CM
BH CV ECHO MEAS - IVSD: 1.1 CM
BH CV ECHO MEAS - LA DIMENSION: 2.3 CM
BH CV ECHO MEAS - LAT PEAK E' VEL: 6.4 CM/SEC
BH CV ECHO MEAS - LV DIASTOLIC VOL/BSA (35-75): 11.3 CM2
BH CV ECHO MEAS - LV MASS(C)D: 117.3 GRAMS
BH CV ECHO MEAS - LV MAX PG: 2.2 MMHG
BH CV ECHO MEAS - LV MEAN PG: 1 MMHG
BH CV ECHO MEAS - LV SYSTOLIC VOL/BSA (12-30): 4.5 CM2
BH CV ECHO MEAS - LV V1 MAX: 74.2 CM/SEC
BH CV ECHO MEAS - LV V1 VTI: 16.8 CM
BH CV ECHO MEAS - LVIDD: 3.8 CM
BH CV ECHO MEAS - LVIDS: 2.8 CM
BH CV ECHO MEAS - LVOT AREA: 2.01 CM2
BH CV ECHO MEAS - LVOT DIAM: 1.6 CM
BH CV ECHO MEAS - LVPWD: 0.9 CM
BH CV ECHO MEAS - MED PEAK E' VEL: 6 CM/SEC
BH CV ECHO MEAS - MV A MAX VEL: 71.1 CM/SEC
BH CV ECHO MEAS - MV E MAX VEL: 58.5 CM/SEC
BH CV ECHO MEAS - MV E/A: 0.82
BH CV ECHO MEAS - PA ACC TIME: 0.08 SEC
BH CV ECHO MEAS - PA V2 MAX: 108 CM/SEC
BH CV ECHO MEAS - RAP SYSTOLE: 10 MMHG
BH CV ECHO MEAS - RVSP: 30.8 MMHG
BH CV ECHO MEAS - SV(LVOT): 33.8 ML
BH CV ECHO MEAS - SV(MOD-SP4): 12.2 ML
BH CV ECHO MEAS - SVI(LVOT): 19 ML/M2
BH CV ECHO MEAS - SVI(MOD-SP4): 6.9 ML/M2
BH CV ECHO MEAS - TAPSE (>1.6): 1.74 CM
BH CV ECHO MEAS - TR MAX PG: 20.8 MMHG
BH CV ECHO MEAS - TR MAX VEL: 228 CM/SEC
BH CV ECHO MEASUREMENTS AVERAGE E/E' RATIO: 9.44
LEFT ATRIUM VOLUME INDEX: 11.8 ML/M2

## 2025-06-10 PROCEDURE — 93306 TTE W/DOPPLER COMPLETE: CPT | Performed by: SPECIALIST

## 2025-06-10 PROCEDURE — 93306 TTE W/DOPPLER COMPLETE: CPT

## (undated) DEVICE — APPL CHLORAPREP TINTED 26ML TEAL

## (undated) DEVICE — ENDOPATH XCEL BLADELESS TROCARS WITH STABILITY SLEEVES: Brand: ENDOPATH XCEL

## (undated) DEVICE — SYR LUERLOK 50ML

## (undated) DEVICE — HYBRID CO2 TUBING/CAP SET FOR OLYMPUS® SCOPES & CO2 SOURCE: Brand: ERBE

## (undated) DEVICE — SAFELINER SUCTION CANISTER 1000CC: Brand: DEROYAL

## (undated) DEVICE — ENDOPATH XCEL UNIVERSAL TROCAR STABLILITY SLEEVES: Brand: ENDOPATH XCEL

## (undated) DEVICE — SUT SILK 2/0 SH 30IN K833H

## (undated) DEVICE — LAPAROVUE VISIBILITY SYSTEM LAPAROSCOPIC SOLUTIONS: Brand: LAPAROVUE

## (undated) DEVICE — LUBE JELLY FOIL PACK 1.4 OZ: Brand: MEDLINE INDUSTRIES, INC.

## (undated) DEVICE — THE BITE BLOCK MAXI, LATEX FREE STRAP IS USED TO PROTECT THE ENDOSCOPE INSERTION TUBE FROM BEING BITTEN BY THE PATIENT.

## (undated) DEVICE — GOWN SURG ORBIS LVL3 2XL STRL

## (undated) DEVICE — INTENDED USE FOR SURGICAL MARKING ON INTACT SKIN, ALSO PROVIDES A PERMANENT METHOD OF IDENTIFYING OBJECTS IN THE OPERATING ROOM: Brand: WRITESITE® REGULAR TIP SKIN MARKER

## (undated) DEVICE — PK LAP LASR CHOLE 10

## (undated) DEVICE — MARYLAND JAW LAPAROSCOPIC SEALER/DIVIDER COATED: Brand: LIGASURE

## (undated) DEVICE — CONTN GRAD MEAS TRIANG 32OZ BLK

## (undated) DEVICE — SUT SILK 0 SH 30IN K834H

## (undated) DEVICE — ADAPT CLN LUM OLYMP AIR/H20

## (undated) DEVICE — KT ORCA ORCAPOD DISP STRL

## (undated) DEVICE — PENROSE DRAIN 18 X .5" SILICONE: Brand: MEDLINE

## (undated) DEVICE — GLV SURG SENSICARE PI MIC PF SZ7.5 LF STRL

## (undated) DEVICE — ENDOCUT SCISSOR TIP, DISPOSABLE: Brand: RENEW

## (undated) DEVICE — FIRST STEP BEDSIDE ADD WATER KIT - RESEALABLE STAND-UP POUCH, ENDOSCOPIC CLEANING PAD - 1 POUCH: Brand: FIRST STEP BEDSIDE ADD WATER KIT - RESEALABLE STAND-UP POUCH, ENDOSCOPIC CLEANIN

## (undated) DEVICE — TUBING, SUCTION, 1/4" X 10', STRAIGHT: Brand: MEDLINE

## (undated) DEVICE — SOL IRR H2O BTL 1000ML STRL

## (undated) DEVICE — LAPAROSCOPIC SMOKE FILTRATION SYSTEM: Brand: PALL LAPAROSHIELD® PLUS LAPAROSCOPIC SMOKE FILTRATION SYSTEM

## (undated) DEVICE — JELLY,LUBE,STERILE,FLIP TOP,TUBE,2-OZ: Brand: MEDLINE

## (undated) DEVICE — INTRO ACCSR BLNT TP

## (undated) DEVICE — PDS II VLT 0 107CM AG ST3: Brand: ENDOLOOP

## (undated) DEVICE — [HIGH FLOW INSUFFLATOR,  DO NOT USE IF PACKAGE IS DAMAGED,  KEEP DRY,  KEEP AWAY FROM SUNLIGHT,  PROTECT FROM HEAT AND RADIOACTIVE SOURCES.]: Brand: PNEUMOSURE

## (undated) DEVICE — ANTIBACTERIAL UNDYED BRAIDED (POLYGLACTIN 910), SYNTHETIC ABSORBABLE SUTURE: Brand: COATED VICRYL

## (undated) DEVICE — PATIENT RETURN ELECTRODE, SINGLE-USE, CONTACT QUALITY MONITORING, ADULT, WITH 9FT CORD, FOR PATIENTS WEIGING OVER 33LBS. (15KG): Brand: MEGADYNE

## (undated) DEVICE — INCISIONLINE PLEDGET SFT 22X1 2.75MM

## (undated) DEVICE — GLV SURG SENSICARE PI MIC PF SZ7 LF STRL

## (undated) DEVICE — SOLIDIFIER LIQ PREMISORB 1500CC